# Patient Record
Sex: FEMALE | Race: WHITE | NOT HISPANIC OR LATINO | Employment: UNEMPLOYED | URBAN - METROPOLITAN AREA
[De-identification: names, ages, dates, MRNs, and addresses within clinical notes are randomized per-mention and may not be internally consistent; named-entity substitution may affect disease eponyms.]

---

## 2020-06-12 ENCOUNTER — LAB VISIT (OUTPATIENT)
Dept: PRIMARY CARE CLINIC | Facility: OTHER | Age: 60
End: 2020-06-12
Payer: MEDICAID

## 2020-06-12 DIAGNOSIS — Z11.59 SCREENING EXAMINATION FOR POLIOMYELITIS: ICD-10-CM

## 2020-06-12 PROCEDURE — U0003 INFECTIOUS AGENT DETECTION BY NUCLEIC ACID (DNA OR RNA); SEVERE ACUTE RESPIRATORY SYNDROME CORONAVIRUS 2 (SARS-COV-2) (CORONAVIRUS DISEASE [COVID-19]), AMPLIFIED PROBE TECHNIQUE, MAKING USE OF HIGH THROUGHPUT TECHNOLOGIES AS DESCRIBED BY CMS-2020-01-R: HCPCS

## 2020-06-13 LAB — SARS-COV-2 RNA RESP QL NAA+PROBE: NOT DETECTED

## 2020-12-10 ENCOUNTER — OFFICE VISIT (OUTPATIENT)
Dept: URGENT CARE | Facility: CLINIC | Age: 60
End: 2020-12-10
Payer: MEDICARE

## 2020-12-10 VITALS
TEMPERATURE: 98 F | HEART RATE: 97 BPM | RESPIRATION RATE: 20 BRPM | DIASTOLIC BLOOD PRESSURE: 69 MMHG | OXYGEN SATURATION: 96 % | WEIGHT: 190 LBS | SYSTOLIC BLOOD PRESSURE: 114 MMHG | HEIGHT: 66 IN | BODY MASS INDEX: 30.53 KG/M2

## 2020-12-10 DIAGNOSIS — U07.1 COVID-19 VIRUS DETECTED: ICD-10-CM

## 2020-12-10 DIAGNOSIS — Z03.818 ENCNTR FOR OBS FOR SUSP EXPSR TO OTH BIOLG AGENTS RULED OUT: ICD-10-CM

## 2020-12-10 DIAGNOSIS — R09.81 CONGESTION OF NASAL SINUS: Primary | ICD-10-CM

## 2020-12-10 DIAGNOSIS — U07.1 LAB TEST POSITIVE FOR DETECTION OF COVID-19 VIRUS: ICD-10-CM

## 2020-12-10 LAB
CTP QC/QA: YES
SARS-COV-2 RDRP RESP QL NAA+PROBE: POSITIVE

## 2020-12-10 PROCEDURE — 99213 OFFICE O/P EST LOW 20 MIN: CPT | Mod: S$GLB,CS,, | Performed by: FAMILY MEDICINE

## 2020-12-10 PROCEDURE — U0002: ICD-10-PCS | Mod: QW,S$GLB,, | Performed by: FAMILY MEDICINE

## 2020-12-10 PROCEDURE — 99213 PR OFFICE/OUTPT VISIT, EST, LEVL III, 20-29 MIN: ICD-10-PCS | Mod: S$GLB,CS,, | Performed by: FAMILY MEDICINE

## 2020-12-10 PROCEDURE — U0002 COVID-19 LAB TEST NON-CDC: HCPCS | Mod: QW,S$GLB,, | Performed by: FAMILY MEDICINE

## 2020-12-10 RX ORDER — LORATADINE 10 MG/1
10 TABLET ORAL DAILY
Qty: 30 TABLET | Refills: 2 | Status: SHIPPED | OUTPATIENT
Start: 2020-12-10 | End: 2021-06-21

## 2020-12-10 NOTE — PROGRESS NOTES
"Subjective:       Patient ID: Maria M Mei is a 60 y.o. female.    Vitals:  height is 5' 6" (1.676 m) and weight is 86.2 kg (190 lb). Her temperature is 98.1 °F (36.7 °C). Her blood pressure is 114/69 and her pulse is 97. Her respiration is 20 and oxygen saturation is 96%.     Chief Complaint: Nasal Congestion (COVID)    60-year-old female with the complaint of having sore throat postnasal drip and sinus congestion for last few days it does did cut exposed to someone at [facility] COVID positive denies fever or chills    Other  This is a new problem. The current episode started yesterday. The problem has been waxing and waning. Pertinent negatives include no arthralgias, chest pain, chills, congestion, coughing, fatigue, fever, headaches, joint swelling, myalgias, nausea, rash, sore throat, vertigo, vomiting or weakness. Nothing aggravates the symptoms. She has tried acetaminophen and NSAIDs (BENADRYL ) for the symptoms. The treatment provided mild relief.       Constitution: Negative for chills, fatigue and fever.   HENT: Positive for sinus pain and sinus pressure. Negative for congestion and sore throat.    Neck: Negative for painful lymph nodes.   Cardiovascular: Negative for chest pain and leg swelling.   Eyes: Negative for double vision and blurred vision.   Respiratory: Negative for cough and shortness of breath.    Gastrointestinal: Negative for nausea, vomiting and diarrhea.   Genitourinary: Negative for dysuria, frequency, urgency and history of kidney stones.   Musculoskeletal: Negative for joint pain, joint swelling, muscle cramps and muscle ache.   Skin: Negative for color change, pale, rash and bruising.   Allergic/Immunologic: Negative for seasonal allergies.   Neurological: Negative for dizziness, history of vertigo, light-headedness, passing out and headaches.   Hematologic/Lymphatic: Negative for swollen lymph nodes.   Psychiatric/Behavioral: Negative for nervous/anxious, sleep disturbance and " depression. The patient is not nervous/anxious.        Objective:      Physical Exam   Constitutional: She is oriented to person, place, and time. She appears well-developed. She is cooperative.  Non-toxic appearance. She does not appear ill. No distress.   HENT:   Head: Normocephalic and atraumatic.   Ears:   Right Ear: Hearing, tympanic membrane, external ear and ear canal normal.   Left Ear: Hearing, tympanic membrane, external ear and ear canal normal.   Nose: Nose normal. No mucosal edema, rhinorrhea or nasal deformity. No epistaxis. Right sinus exhibits no maxillary sinus tenderness and no frontal sinus tenderness. Left sinus exhibits no maxillary sinus tenderness and no frontal sinus tenderness.   Mouth/Throat: Uvula is midline, oropharynx is clear and moist and mucous membranes are normal. No trismus in the jaw. Normal dentition. No uvula swelling. No posterior oropharyngeal erythema.   Eyes: Conjunctivae and lids are normal. Right eye exhibits no discharge. Left eye exhibits no discharge. No scleral icterus.   Neck: Trachea normal, normal range of motion, full passive range of motion without pain and phonation normal. Neck supple.   Cardiovascular: Normal rate, regular rhythm, normal heart sounds and normal pulses.   Pulmonary/Chest: Effort normal and breath sounds normal. No respiratory distress.   Abdominal: Soft. Normal appearance and bowel sounds are normal. She exhibits no distension, no pulsatile midline mass and no mass. There is no abdominal tenderness.   Musculoskeletal: Normal range of motion.         General: No deformity.   Neurological: She is alert and oriented to person, place, and time. She exhibits normal muscle tone. Coordination normal.   Skin: Skin is warm, dry, intact, not diaphoretic and not pale. Psychiatric: Her speech is normal and behavior is normal. Judgment and thought content normal.   Nursing note and vitals reviewed.        Assessment:       1. Congestion of nasal sinus    2.  Encntr for obs for susp expsr to oth biolg agents ruled out        Plan:         Congestion of nasal sinus  -     POCT COVID-19 Rapid Screening    Encntr for obs for susp expsr to oth biolg agents ruled out    Other orders  -     loratadine (CLARITIN) 10 mg tablet; Take 1 tablet (10 mg total) by mouth once daily.  Dispense: 30 tablet; Refill: 2            Results for orders placed or performed in visit on 12/10/20   POCT COVID-19 Rapid Screening   Result Value Ref Range    POC Rapid COVID Positive (A) Negative     Acceptable Yes      Patient advised to quarantine for 7-10 days Tylenol as needed for fever Claritin once a day rest and fluids

## 2020-12-10 NOTE — PATIENT INSTRUCTIONS
Please isolate yourself at home.  You may leave home and/or return to work once the following conditions are met:    If you were not hospitalized and are not severely immunocompromised*:   More than 10 days since symptoms first appeared AND   More than 24 hours fever free without medications AND   Symptoms have improved     If you were hospitalized OR are severely immunocompromised*:   More than 20 days since symptoms first appeared   More than 24 hours fever free without medications   Symptoms have improved    If you had no symptoms but tested positive:   More than 10 days since the date of the first positive test (20 days if severely immunocompromised).   If you develop symptoms, then use the guidelines above.     *Definition of severely immunocompromised:  - Current chemotherapy for cancer  - Untreated HIV with CD4 count less than 200  - Combined primary immunodeficiency disorder  - Prednisone more than 20 mg per day for more than 14 days  - Post-transplant patients    Additional instructions:   Separate yourself from other people and animals in your home.   Call ahead before visiting your doctor.   Wear a facemask when around others.   Cover your coughs and sneezes.   Wash your hands often with soap and water; hand  can be used, too.   Avoid sharing personal household items.   Wipe down surfaces used daily.   Monitor your symptoms. Seek prompt medical attention if your illness is worsening (e.g., difficulty breathing).    Before seeking care, call your healthcare provider.   If you have a medical emergency and need to call 911, notify the dispatch personnel that you have, or are being evaluated for COVID-19. If possible, put on a facemask before emergency medical services arrive.        Contact Tracing    As one of the next steps, you will receive a call or text from the [place] [facility] ([facility]) COVID-19 Tracing Team. See the contact information below so you know not to  ignore the health departments call. It is important that you contact them back immediately so they can help.      Contact Tracer Number:  [phone]  Caller ID for most carriers: [place] [facility]     What is contact tracing?  · Contact tracing is a process that helps identify everyone who has been in close contact with an infected person. Contact tracers let those people know they may have been exposed and guide them on next steps. Confidentiality is important for everyone; no one will be told who may have exposed them to the virus.  · Your involvement is important. The more we know about where and how this virus is spreading, the better chance we have at stopping it from spreading further.  What does exposure mean?  · Exposure means you have been within 6 feet for more than 15 minutes with a person who has or had COVID-19.  What kind of questions do the contact tracers ask?  · A contact tracer will confirm your basic contact information including name, address, phone number, and next of kin, as well as asking about any symptoms you may have had. Theyll also ask you how you think you may have gotten sick, such as places where you may have been exposed to the virus, and people you were with. Those names will never be shared with anyone outside of that call, and will only be used to help trace and stop the spread of the virus.   I have privacy concerns. How will the state use my information?  · Your privacy about your health is important. All calls are completed using call centers that use the appropriate health privacy protection measures (HIPAA compliance), meaning that your patient information is safe. No one will ever ask you any questions related to immigration status. Your health comes first.   Do I have to participate?  · You do not have to participate, but we strongly encourage you to. Contact tracing can help us catch and control new outbreaks as theyre developing to keep your friends and family safe.    What if I dont hear from anyone?  · If you dont receive a call within 24 hours, you can call the number above right away to inquire about your status. That line is open from 8:00 am - 8:00 p.m., 7 days a week.  Contact tracing saves lives! Together, we have the power to beat this virus and keep our loved ones and neighbors safe.    For more information see CDC link below.      https://www.cdc.gov/coronavirus/2019-ncov/hcp/guidance-prevent-spread.html#precautions        Sources:  CDC, [place] [facility] and [facility]

## 2021-04-23 ENCOUNTER — TELEPHONE (OUTPATIENT)
Dept: FAMILY MEDICINE | Facility: CLINIC | Age: 61
End: 2021-04-23

## 2021-04-23 NOTE — TELEPHONE ENCOUNTER
----- Message from Bea Winkler sent at 4/23/2021  8:26 AM CDT -----  Regarding: Appointment Request  Contact: Self/ [phone]  Patient would like to be seen sooner than the next available appointment. She would like to be seen in order to establish care. Please advise.

## 2021-05-20 ENCOUNTER — TELEPHONE (OUTPATIENT)
Dept: OBSTETRICS AND GYNECOLOGY | Facility: CLINIC | Age: 61
End: 2021-05-20

## 2021-05-22 ENCOUNTER — HOSPITAL ENCOUNTER (OUTPATIENT)
Facility: HOSPITAL | Age: 61
End: 2021-05-23
Attending: EMERGENCY MEDICINE | Admitting: FAMILY MEDICINE
Payer: COMMERCIAL

## 2021-05-22 DIAGNOSIS — I63.9 STROKE: ICD-10-CM

## 2021-05-22 LAB
ALBUMIN SERPL BCP-MCNC: 3.7 G/DL (ref 3.5–5.2)
ALP SERPL-CCNC: 85 U/L (ref 55–135)
ALT SERPL W/O P-5'-P-CCNC: 21 U/L (ref 10–44)
ANION GAP SERPL CALC-SCNC: 7 MMOL/L (ref 8–16)
APTT BLDCRRT: 23.2 SEC (ref 21–32)
AST SERPL-CCNC: 22 U/L (ref 10–40)
B-OH-BUTYR BLD STRIP-SCNC: 0 MMOL/L (ref 0–0.5)
BASOPHILS # BLD AUTO: 0.07 K/UL (ref 0–0.2)
BASOPHILS NFR BLD: 0.7 % (ref 0–1.9)
BILIRUB SERPL-MCNC: 0.3 MG/DL (ref 0.1–1)
BUN SERPL-MCNC: 25 MG/DL (ref 6–20)
CALCIUM SERPL-MCNC: 9.6 MG/DL (ref 8.7–10.5)
CHLORIDE SERPL-SCNC: 107 MMOL/L (ref 95–110)
CHOLEST SERPL-MCNC: 239 MG/DL (ref 120–199)
CHOLEST/HDLC SERPL: 7.7 {RATIO} (ref 2–5)
CO2 SERPL-SCNC: 28 MMOL/L (ref 23–29)
CREAT SERPL-MCNC: 1.3 MG/DL (ref 0.5–1.4)
CREAT SERPL-MCNC: 1.4 MG/DL (ref 0.5–1.4)
DELSYS: NORMAL
DELSYS: NORMAL
DIFFERENTIAL METHOD: ABNORMAL
EOSINOPHIL # BLD AUTO: 0.3 K/UL (ref 0–0.5)
EOSINOPHIL NFR BLD: 2.7 % (ref 0–8)
ERYTHROCYTE [DISTWIDTH] IN BLOOD BY AUTOMATED COUNT: 13.8 % (ref 11.5–14.5)
EST. GFR  (AFRICAN AMERICAN): 52 ML/MIN/1.73 M^2
EST. GFR  (NON AFRICAN AMERICAN): 45 ML/MIN/1.73 M^2
GLUCOSE SERPL-MCNC: 89 MG/DL (ref 70–110)
GLUCOSE SERPL-MCNC: 98 MG/DL (ref 70–110)
HCT VFR BLD AUTO: 41.9 % (ref 37–48.5)
HDLC SERPL-MCNC: 31 MG/DL (ref 40–75)
HDLC SERPL: 13 % (ref 20–50)
HGB BLD-MCNC: 14 G/DL (ref 12–16)
IMM GRANULOCYTES # BLD AUTO: 0.03 K/UL (ref 0–0.04)
IMM GRANULOCYTES NFR BLD AUTO: 0.3 % (ref 0–0.5)
INR PPP: 1 (ref 0.8–1.2)
LDLC SERPL CALC-MCNC: 135 MG/DL (ref 63–159)
LYMPHOCYTES # BLD AUTO: 4.3 K/UL (ref 1–4.8)
LYMPHOCYTES NFR BLD: 42.7 % (ref 18–48)
MCH RBC QN AUTO: 30.1 PG (ref 27–31)
MCHC RBC AUTO-ENTMCNC: 33.4 G/DL (ref 32–36)
MCV RBC AUTO: 90 FL (ref 82–98)
MONOCYTES # BLD AUTO: 0.7 K/UL (ref 0.3–1)
MONOCYTES NFR BLD: 7 % (ref 4–15)
NEUTROPHILS # BLD AUTO: 4.7 K/UL (ref 1.8–7.7)
NEUTROPHILS NFR BLD: 46.6 % (ref 38–73)
NONHDLC SERPL-MCNC: 208 MG/DL
NRBC BLD-RTO: 0 /100 WBC
PLATELET # BLD AUTO: 280 K/UL (ref 150–450)
PMV BLD AUTO: 8.9 FL (ref 9.2–12.9)
POC PTINR: 1 (ref 0.9–1.2)
POC PTWBT: 11.9 SEC (ref 9.7–14.3)
POCT GLUCOSE: 91 MG/DL (ref 70–110)
POTASSIUM SERPL-SCNC: 3.9 MMOL/L (ref 3.5–5.1)
PROT SERPL-MCNC: 7.1 G/DL (ref 6–8.4)
PROTHROMBIN TIME: 10.6 SEC (ref 9–12.5)
RBC # BLD AUTO: 4.65 M/UL (ref 4–5.4)
SAMPLE: NORMAL
SAMPLE: NORMAL
SODIUM SERPL-SCNC: 142 MMOL/L (ref 136–145)
TRIGL SERPL-MCNC: 365 MG/DL (ref 30–150)
WBC # BLD AUTO: 10.08 K/UL (ref 3.9–12.7)

## 2021-05-22 PROCEDURE — 84484 ASSAY OF TROPONIN QUANT: CPT | Performed by: EMERGENCY MEDICINE

## 2021-05-22 PROCEDURE — 99292 CRITICAL CARE ADDL 30 MIN: CPT

## 2021-05-22 PROCEDURE — 25000003 PHARM REV CODE 250: Performed by: EMERGENCY MEDICINE

## 2021-05-22 PROCEDURE — 85730 THROMBOPLASTIN TIME PARTIAL: CPT | Performed by: EMERGENCY MEDICINE

## 2021-05-22 PROCEDURE — 85610 PROTHROMBIN TIME: CPT

## 2021-05-22 PROCEDURE — U0002 COVID-19 LAB TEST NON-CDC: HCPCS | Performed by: EMERGENCY MEDICINE

## 2021-05-22 PROCEDURE — 85610 PROTHROMBIN TIME: CPT | Performed by: EMERGENCY MEDICINE

## 2021-05-22 PROCEDURE — G0426 PR INPT TELEHEALTH CONSULT 50M: ICD-10-PCS | Mod: GT,,, | Performed by: PSYCHIATRY & NEUROLOGY

## 2021-05-22 PROCEDURE — G0426 INPT/ED TELECONSULT50: HCPCS | Mod: GT,,, | Performed by: PSYCHIATRY & NEUROLOGY

## 2021-05-22 PROCEDURE — 82010 KETONE BODYS QUAN: CPT | Performed by: EMERGENCY MEDICINE

## 2021-05-22 PROCEDURE — 80061 LIPID PANEL: CPT | Performed by: EMERGENCY MEDICINE

## 2021-05-22 PROCEDURE — 99291 CRITICAL CARE FIRST HOUR: CPT | Mod: 25

## 2021-05-22 PROCEDURE — 82565 ASSAY OF CREATININE: CPT

## 2021-05-22 PROCEDURE — 85025 COMPLETE CBC W/AUTO DIFF WBC: CPT | Performed by: EMERGENCY MEDICINE

## 2021-05-22 PROCEDURE — 84443 ASSAY THYROID STIM HORMONE: CPT | Performed by: EMERGENCY MEDICINE

## 2021-05-22 PROCEDURE — 99900035 HC TECH TIME PER 15 MIN (STAT)

## 2021-05-22 PROCEDURE — 83880 ASSAY OF NATRIURETIC PEPTIDE: CPT | Performed by: EMERGENCY MEDICINE

## 2021-05-22 PROCEDURE — 80053 COMPREHEN METABOLIC PANEL: CPT | Performed by: EMERGENCY MEDICINE

## 2021-05-22 PROCEDURE — 82962 GLUCOSE BLOOD TEST: CPT

## 2021-05-22 RX ORDER — ASPIRIN 325 MG
325 TABLET ORAL
Status: COMPLETED | OUTPATIENT
Start: 2021-05-22 | End: 2021-05-22

## 2021-05-22 RX ADMIN — ASPIRIN 325 MG ORAL TABLET 325 MG: 325 PILL ORAL at 11:05

## 2021-05-23 ENCOUNTER — HOSPITAL ENCOUNTER (INPATIENT)
Facility: HOSPITAL | Age: 61
LOS: 1 days | Discharge: HOME | DRG: 065 | End: 2021-05-24
Attending: EMERGENCY MEDICINE | Admitting: PSYCHIATRY & NEUROLOGY
Payer: MEDICARE

## 2021-05-23 VITALS
WEIGHT: 209 LBS | BODY MASS INDEX: 33.73 KG/M2 | HEART RATE: 63 BPM | OXYGEN SATURATION: 97 % | RESPIRATION RATE: 20 BRPM | DIASTOLIC BLOOD PRESSURE: 69 MMHG | TEMPERATURE: 98 F | SYSTOLIC BLOOD PRESSURE: 149 MMHG

## 2021-05-23 DIAGNOSIS — I63.512 ACUTE ISCHEMIC LEFT MCA STROKE: ICD-10-CM

## 2021-05-23 DIAGNOSIS — G45.9 TIA (TRANSIENT ISCHEMIC ATTACK): ICD-10-CM

## 2021-05-23 DIAGNOSIS — I63.412 EMBOLIC STROKE INVOLVING LEFT MIDDLE CEREBRAL ARTERY: ICD-10-CM

## 2021-05-23 DIAGNOSIS — I63.9 CEREBROVASCULAR ACCIDENT (CVA), UNSPECIFIED MECHANISM: Primary | ICD-10-CM

## 2021-05-23 PROBLEM — G93.6 CYTOTOXIC CEREBRAL EDEMA: Status: ACTIVE | Noted: 2021-05-23

## 2021-05-23 PROBLEM — F17.200 SMOKER: Status: ACTIVE | Noted: 2021-05-23

## 2021-05-23 PROBLEM — E78.2 MIXED HYPERLIPIDEMIA: Status: ACTIVE | Noted: 2021-05-23

## 2021-05-23 PROBLEM — Z92.82 S/P ADMN TPA IN DIFF FAC W/N LAST 24 HR BEF ADM TO CRNT FAC: Status: ACTIVE | Noted: 2021-05-23

## 2021-05-23 LAB
ABO + RH BLD: NORMAL
BACTERIA #/AREA URNS AUTO: NORMAL /HPF
BILIRUB UR QL STRIP: NEGATIVE
BLD GP AB SCN CELLS X3 SERPL QL: NORMAL
BNP SERPL-MCNC: 16 PG/ML (ref 0–99)
CHOLEST SERPL-MCNC: 216 MG/DL (ref 120–199)
CHOLEST/HDLC SERPL: 8.6 {RATIO} (ref 2–5)
CLARITY UR REFRACT.AUTO: CLEAR
COLOR UR AUTO: YELLOW
CTP QC/QA: YES
ESTIMATED AVG GLUCOSE: 108 MG/DL (ref 68–131)
GLUCOSE UR QL STRIP: NEGATIVE
HBA1C MFR BLD: 5.4 % (ref 4–5.6)
HDLC SERPL-MCNC: 25 MG/DL (ref 40–75)
HDLC SERPL: 11.6 % (ref 20–50)
HGB UR QL STRIP: NEGATIVE
KETONES UR QL STRIP: NEGATIVE
LDLC SERPL CALC-MCNC: 130.2 MG/DL (ref 63–159)
LEUKOCYTE ESTERASE UR QL STRIP: NEGATIVE
MICROSCOPIC COMMENT: NORMAL
NITRITE UR QL STRIP: NEGATIVE
NONHDLC SERPL-MCNC: 191 MG/DL
PH UR STRIP: 5 [PH] (ref 5–8)
POCT GLUCOSE: 83 MG/DL (ref 70–110)
PROT UR QL STRIP: NEGATIVE
RBC #/AREA URNS AUTO: 0 /HPF (ref 0–4)
SARS-COV-2 RDRP RESP QL NAA+PROBE: NEGATIVE
SP GR UR STRIP: >=1.03 (ref 1–1.03)
TRIGL SERPL-MCNC: 304 MG/DL (ref 30–150)
TROPONIN I SERPL DL<=0.01 NG/ML-MCNC: 0.01 NG/ML (ref 0–0.03)
TSH SERPL DL<=0.005 MIU/L-ACNC: 2.92 UIU/ML (ref 0.4–4)
TSH SERPL DL<=0.005 MIU/L-ACNC: 3.7 UIU/ML (ref 0.4–4)
URN SPEC COLLECT METH UR: ABNORMAL
WBC #/AREA URNS AUTO: 1 /HPF (ref 0–5)

## 2021-05-23 PROCEDURE — 37195 THROMBOLYTIC THERAPY STROKE: CPT

## 2021-05-23 PROCEDURE — 99291 CRITICAL CARE FIRST HOUR: CPT | Mod: ,,, | Performed by: EMERGENCY MEDICINE

## 2021-05-23 PROCEDURE — 93010 EKG 12-LEAD: ICD-10-PCS | Mod: ,,, | Performed by: INTERNAL MEDICINE

## 2021-05-23 PROCEDURE — G0378 HOSPITAL OBSERVATION PER HR: HCPCS

## 2021-05-23 PROCEDURE — 25000003 PHARM REV CODE 250: Performed by: EMERGENCY MEDICINE

## 2021-05-23 PROCEDURE — 84443 ASSAY THYROID STIM HORMONE: CPT | Performed by: PHYSICIAN ASSISTANT

## 2021-05-23 PROCEDURE — 93010 ELECTROCARDIOGRAM REPORT: CPT | Mod: 76,,, | Performed by: INTERNAL MEDICINE

## 2021-05-23 PROCEDURE — 94761 N-INVAS EAR/PLS OXIMETRY MLT: CPT

## 2021-05-23 PROCEDURE — 25000003 PHARM REV CODE 250: Performed by: NURSE PRACTITIONER

## 2021-05-23 PROCEDURE — 93010 EKG 12-LEAD: ICD-10-PCS | Mod: 76,,, | Performed by: INTERNAL MEDICINE

## 2021-05-23 PROCEDURE — 92610 EVALUATE SWALLOWING FUNCTION: CPT

## 2021-05-23 PROCEDURE — 80061 LIPID PANEL: CPT | Performed by: PHYSICIAN ASSISTANT

## 2021-05-23 PROCEDURE — 25000003 PHARM REV CODE 250: Performed by: PSYCHIATRY & NEUROLOGY

## 2021-05-23 PROCEDURE — 63600175 PHARM REV CODE 636 W HCPCS: Mod: JG | Performed by: EMERGENCY MEDICINE

## 2021-05-23 PROCEDURE — 25500020 PHARM REV CODE 255: Performed by: EMERGENCY MEDICINE

## 2021-05-23 PROCEDURE — 93010 ELECTROCARDIOGRAM REPORT: CPT | Mod: ,,, | Performed by: INTERNAL MEDICINE

## 2021-05-23 PROCEDURE — 99223 1ST HOSP IP/OBS HIGH 75: CPT | Mod: ,,, | Performed by: PSYCHIATRY & NEUROLOGY

## 2021-05-23 PROCEDURE — 81001 URINALYSIS AUTO W/SCOPE: CPT | Performed by: PHYSICIAN ASSISTANT

## 2021-05-23 PROCEDURE — 83036 HEMOGLOBIN GLYCOSYLATED A1C: CPT | Performed by: PHYSICIAN ASSISTANT

## 2021-05-23 PROCEDURE — 99223 1ST HOSP IP/OBS HIGH 75: CPT | Mod: ,,, | Performed by: PHYSICIAN ASSISTANT

## 2021-05-23 PROCEDURE — 63600175 PHARM REV CODE 636 W HCPCS: Mod: JW,JG | Performed by: EMERGENCY MEDICINE

## 2021-05-23 PROCEDURE — 93005 ELECTROCARDIOGRAM TRACING: CPT

## 2021-05-23 PROCEDURE — S4991 NICOTINE PATCH NONLEGEND: HCPCS | Performed by: NURSE PRACTITIONER

## 2021-05-23 PROCEDURE — 25000003 PHARM REV CODE 250: Performed by: PHYSICIAN ASSISTANT

## 2021-05-23 PROCEDURE — 97165 OT EVAL LOW COMPLEX 30 MIN: CPT

## 2021-05-23 PROCEDURE — 86803 HEPATITIS C AB TEST: CPT | Performed by: EMERGENCY MEDICINE

## 2021-05-23 PROCEDURE — 99291 PR CRITICAL CARE, E/M 30-74 MINUTES: ICD-10-PCS | Mod: ,,, | Performed by: EMERGENCY MEDICINE

## 2021-05-23 PROCEDURE — 99291 CRITICAL CARE FIRST HOUR: CPT | Mod: 25

## 2021-05-23 PROCEDURE — 86900 BLOOD TYPING SEROLOGIC ABO: CPT | Performed by: PHYSICIAN ASSISTANT

## 2021-05-23 PROCEDURE — 20000000 HC ICU ROOM

## 2021-05-23 PROCEDURE — 99223 PR INITIAL HOSPITAL CARE,LEVL III: ICD-10-PCS | Mod: ,,, | Performed by: PSYCHIATRY & NEUROLOGY

## 2021-05-23 PROCEDURE — 86703 HIV-1/HIV-2 1 RESULT ANTBDY: CPT | Performed by: EMERGENCY MEDICINE

## 2021-05-23 PROCEDURE — 25500020 PHARM REV CODE 255: Performed by: FAMILY MEDICINE

## 2021-05-23 PROCEDURE — A9585 GADOBUTROL INJECTION: HCPCS | Performed by: FAMILY MEDICINE

## 2021-05-23 PROCEDURE — 99292 CRITICAL CARE ADDL 30 MIN: CPT | Mod: 25

## 2021-05-23 PROCEDURE — 99223 PR INITIAL HOSPITAL CARE,LEVL III: ICD-10-PCS | Mod: ,,, | Performed by: PHYSICIAN ASSISTANT

## 2021-05-23 RX ORDER — ASPIRIN 81 MG/1
81 TABLET ORAL DAILY
Status: DISCONTINUED | OUTPATIENT
Start: 2021-05-23 | End: 2021-05-23

## 2021-05-23 RX ORDER — ONDANSETRON 2 MG/ML
8 INJECTION INTRAMUSCULAR; INTRAVENOUS EVERY 8 HOURS PRN
Status: DISCONTINUED | OUTPATIENT
Start: 2021-05-23 | End: 2021-05-24 | Stop reason: HOSPADM

## 2021-05-23 RX ORDER — ATORVASTATIN CALCIUM 20 MG/1
40 TABLET, FILM COATED ORAL DAILY
Status: DISCONTINUED | OUTPATIENT
Start: 2021-05-23 | End: 2021-05-24 | Stop reason: HOSPADM

## 2021-05-23 RX ORDER — SODIUM CHLORIDE 0.9 % (FLUSH) 0.9 %
10 SYRINGE (ML) INJECTION
Status: DISCONTINUED | OUTPATIENT
Start: 2021-05-23 | End: 2021-05-24 | Stop reason: HOSPADM

## 2021-05-23 RX ORDER — CLOPIDOGREL BISULFATE 75 MG/1
75 TABLET ORAL DAILY
Status: DISCONTINUED | OUTPATIENT
Start: 2021-05-23 | End: 2021-05-23

## 2021-05-23 RX ORDER — ONDANSETRON 2 MG/ML
4 INJECTION INTRAMUSCULAR; INTRAVENOUS EVERY 12 HOURS PRN
Status: DISCONTINUED | OUTPATIENT
Start: 2021-05-23 | End: 2021-05-23

## 2021-05-23 RX ORDER — FAMOTIDINE 20 MG/1
20 TABLET, FILM COATED ORAL 2 TIMES DAILY
Status: DISCONTINUED | OUTPATIENT
Start: 2021-05-23 | End: 2021-05-24 | Stop reason: HOSPADM

## 2021-05-23 RX ORDER — MUPIROCIN 20 MG/G
OINTMENT TOPICAL 2 TIMES DAILY
Status: DISCONTINUED | OUTPATIENT
Start: 2021-05-23 | End: 2021-05-24 | Stop reason: HOSPADM

## 2021-05-23 RX ORDER — ATORVASTATIN CALCIUM 40 MG/1
40 TABLET, FILM COATED ORAL DAILY
Status: DISCONTINUED | OUTPATIENT
Start: 2021-05-23 | End: 2021-05-23

## 2021-05-23 RX ORDER — SODIUM CHLORIDE 9 MG/ML
INJECTION, SOLUTION INTRAVENOUS
Status: COMPLETED | OUTPATIENT
Start: 2021-05-23 | End: 2021-05-23

## 2021-05-23 RX ORDER — SODIUM CHLORIDE 0.9 % (FLUSH) 0.9 %
10 SYRINGE (ML) INJECTION
Status: DISCONTINUED | OUTPATIENT
Start: 2021-05-23 | End: 2021-05-23

## 2021-05-23 RX ORDER — ENOXAPARIN SODIUM 100 MG/ML
40 INJECTION SUBCUTANEOUS EVERY 24 HOURS
Status: DISCONTINUED | OUTPATIENT
Start: 2021-05-23 | End: 2021-05-23

## 2021-05-23 RX ORDER — AMOXICILLIN 250 MG
1 CAPSULE ORAL 2 TIMES DAILY
Status: DISCONTINUED | OUTPATIENT
Start: 2021-05-23 | End: 2021-05-24 | Stop reason: HOSPADM

## 2021-05-23 RX ORDER — IBUPROFEN 200 MG
1 TABLET ORAL DAILY
Status: DISCONTINUED | OUTPATIENT
Start: 2021-05-23 | End: 2021-05-24 | Stop reason: HOSPADM

## 2021-05-23 RX ORDER — SODIUM CHLORIDE 9 MG/ML
INJECTION, SOLUTION INTRAVENOUS CONTINUOUS
Status: DISCONTINUED | OUTPATIENT
Start: 2021-05-23 | End: 2021-05-24 | Stop reason: HOSPADM

## 2021-05-23 RX ORDER — ACETAMINOPHEN 325 MG/1
650 TABLET ORAL EVERY 6 HOURS PRN
Status: DISCONTINUED | OUTPATIENT
Start: 2021-05-23 | End: 2021-05-23

## 2021-05-23 RX ORDER — GADOBUTROL 604.72 MG/ML
10 INJECTION INTRAVENOUS
Status: COMPLETED | OUTPATIENT
Start: 2021-05-23 | End: 2021-05-23

## 2021-05-23 RX ORDER — LABETALOL HYDROCHLORIDE 5 MG/ML
10 INJECTION, SOLUTION INTRAVENOUS
Status: DISCONTINUED | OUTPATIENT
Start: 2021-05-23 | End: 2021-05-23

## 2021-05-23 RX ORDER — ACETAMINOPHEN 325 MG/1
650 TABLET ORAL EVERY 6 HOURS PRN
Status: DISCONTINUED | OUTPATIENT
Start: 2021-05-23 | End: 2021-05-24 | Stop reason: HOSPADM

## 2021-05-23 RX ADMIN — SODIUM CHLORIDE: 9 INJECTION, SOLUTION INTRAVENOUS at 03:05

## 2021-05-23 RX ADMIN — SODIUM CHLORIDE 100 ML/HR: 0.9 INJECTION, SOLUTION INTRAVENOUS at 12:05

## 2021-05-23 RX ADMIN — FAMOTIDINE 20 MG: 20 TABLET ORAL at 08:05

## 2021-05-23 RX ADMIN — ATORVASTATIN CALCIUM 40 MG: 20 TABLET, FILM COATED ORAL at 10:05

## 2021-05-23 RX ADMIN — DOCUSATE SODIUM 50MG AND SENNOSIDES 8.6MG 1 TABLET: 8.6; 5 TABLET, FILM COATED ORAL at 10:05

## 2021-05-23 RX ADMIN — GADOBUTROL 10 ML: 604.72 INJECTION INTRAVENOUS at 01:05

## 2021-05-23 RX ADMIN — ALTEPLASE 76.8 MG: KIT at 02:05

## 2021-05-23 RX ADMIN — MUPIROCIN: 20 OINTMENT TOPICAL at 08:05

## 2021-05-23 RX ADMIN — FAMOTIDINE 20 MG: 20 TABLET ORAL at 10:05

## 2021-05-23 RX ADMIN — SODIUM CHLORIDE: 0.9 INJECTION, SOLUTION INTRAVENOUS at 06:05

## 2021-05-23 RX ADMIN — IOHEXOL 125 ML: 350 INJECTION, SOLUTION INTRAVENOUS at 04:05

## 2021-05-23 RX ADMIN — IOHEXOL 100 ML: 350 INJECTION, SOLUTION INTRAVENOUS at 12:05

## 2021-05-23 RX ADMIN — DOCUSATE SODIUM 50MG AND SENNOSIDES 8.6MG 1 TABLET: 8.6; 5 TABLET, FILM COATED ORAL at 08:05

## 2021-05-23 RX ADMIN — NICOTINE 1 PATCH: 21 PATCH, EXTENDED RELEASE TRANSDERMAL at 06:05

## 2021-05-24 VITALS
BODY MASS INDEX: 31.71 KG/M2 | SYSTOLIC BLOOD PRESSURE: 133 MMHG | DIASTOLIC BLOOD PRESSURE: 75 MMHG | TEMPERATURE: 98 F | WEIGHT: 202 LBS | RESPIRATION RATE: 18 BRPM | HEIGHT: 67 IN | OXYGEN SATURATION: 93 % | HEART RATE: 56 BPM

## 2021-05-24 LAB
ALBUMIN SERPL BCP-MCNC: 3.5 G/DL (ref 3.5–5.2)
ALP SERPL-CCNC: 90 U/L (ref 55–135)
ALT SERPL W/O P-5'-P-CCNC: 25 U/L (ref 10–44)
ANION GAP SERPL CALC-SCNC: 10 MMOL/L (ref 8–16)
ASCENDING AORTA: 3.12 CM
AST SERPL-CCNC: 27 U/L (ref 10–40)
AV INDEX (PROSTH): 0.66
AV MEAN GRADIENT: 5 MMHG
AV PEAK GRADIENT: 9 MMHG
AV VALVE AREA: 2.38 CM2
AV VELOCITY RATIO: 0.71
BASOPHILS # BLD AUTO: 0.05 K/UL (ref 0–0.2)
BASOPHILS NFR BLD: 0.6 % (ref 0–1.9)
BILIRUB SERPL-MCNC: 0.5 MG/DL (ref 0.1–1)
BSA FOR ECHO PROCEDURE: 2.08 M2
BUN SERPL-MCNC: 16 MG/DL (ref 6–20)
CALCIUM SERPL-MCNC: 9 MG/DL (ref 8.7–10.5)
CHLORIDE SERPL-SCNC: 109 MMOL/L (ref 95–110)
CO2 SERPL-SCNC: 21 MMOL/L (ref 23–29)
CREAT SERPL-MCNC: 0.9 MG/DL (ref 0.5–1.4)
CV ECHO LV RWT: 0.25 CM
DIFFERENTIAL METHOD: ABNORMAL
DOP CALC AO PEAK VEL: 1.47 M/S
DOP CALC AO VTI: 31.84 CM
DOP CALC LVOT AREA: 3.6 CM2
DOP CALC LVOT DIAMETER: 2.14 CM
DOP CALC LVOT PEAK VEL: 1.04 M/S
DOP CALC LVOT STROKE VOLUME: 75.67 CM3
DOP CALCLVOT PEAK VEL VTI: 21.05 CM
E WAVE DECELERATION TIME: 223.45 MSEC
E/A RATIO: 0.82
E/E' RATIO: 5.31 M/S
ECHO LV POSTERIOR WALL: 0.68 CM (ref 0.6–1.1)
EJECTION FRACTION: 55 %
EOSINOPHIL # BLD AUTO: 0.4 K/UL (ref 0–0.5)
EOSINOPHIL NFR BLD: 4.8 % (ref 0–8)
ERYTHROCYTE [DISTWIDTH] IN BLOOD BY AUTOMATED COUNT: 13.8 % (ref 11.5–14.5)
EST. GFR  (AFRICAN AMERICAN): >60 ML/MIN/1.73 M^2
EST. GFR  (NON AFRICAN AMERICAN): >60 ML/MIN/1.73 M^2
FRACTIONAL SHORTENING: 31 % (ref 28–44)
GLUCOSE SERPL-MCNC: 88 MG/DL (ref 70–110)
HCT VFR BLD AUTO: 43.4 % (ref 37–48.5)
HCV AB SERPL QL IA: NEGATIVE
HGB BLD-MCNC: 14.1 G/DL (ref 12–16)
HIV 1+2 AB+HIV1 P24 AG SERPL QL IA: NEGATIVE
IMM GRANULOCYTES # BLD AUTO: 0.03 K/UL (ref 0–0.04)
IMM GRANULOCYTES NFR BLD AUTO: 0.4 % (ref 0–0.5)
INTERVENTRICULAR SEPTUM: 0.72 CM (ref 0.6–1.1)
IVRT: 97.05 MSEC
LA MAJOR: 5.56 CM
LA MINOR: 5.52 CM
LA WIDTH: 4.68 CM
LEFT ATRIUM SIZE: 3.74 CM
LEFT ATRIUM VOLUME INDEX MOD: 46.8 ML/M2
LEFT ATRIUM VOLUME INDEX: 40.6 ML/M2
LEFT ATRIUM VOLUME MOD: 94.99 CM3
LEFT ATRIUM VOLUME: 82.42 CM3
LEFT INTERNAL DIMENSION IN SYSTOLE: 3.71 CM (ref 2.1–4)
LEFT VENTRICLE DIASTOLIC VOLUME INDEX: 69.65 ML/M2
LEFT VENTRICLE DIASTOLIC VOLUME: 141.38 ML
LEFT VENTRICLE MASS INDEX: 65 G/M2
LEFT VENTRICLE SYSTOLIC VOLUME INDEX: 28.8 ML/M2
LEFT VENTRICLE SYSTOLIC VOLUME: 58.51 ML
LEFT VENTRICULAR INTERNAL DIMENSION IN DIASTOLE: 5.4 CM (ref 3.5–6)
LEFT VENTRICULAR MASS: 131.2 G
LV LATERAL E/E' RATIO: 4.31 M/S
LV SEPTAL E/E' RATIO: 6.9 M/S
LYMPHOCYTES # BLD AUTO: 3.3 K/UL (ref 1–4.8)
LYMPHOCYTES NFR BLD: 40 % (ref 18–48)
MAGNESIUM SERPL-MCNC: 1.7 MG/DL (ref 1.6–2.6)
MCH RBC QN AUTO: 30.5 PG (ref 27–31)
MCHC RBC AUTO-ENTMCNC: 32.5 G/DL (ref 32–36)
MCV RBC AUTO: 94 FL (ref 82–98)
MONOCYTES # BLD AUTO: 0.7 K/UL (ref 0.3–1)
MONOCYTES NFR BLD: 7.9 % (ref 4–15)
MV A" WAVE DURATION": 7.71 MSEC
MV PEAK A VEL: 0.84 M/S
MV PEAK E VEL: 0.69 M/S
MV STENOSIS PRESSURE HALF TIME: 64.8 MS
MV VALVE AREA P 1/2 METHOD: 3.4 CM2
NEUTROPHILS # BLD AUTO: 3.8 K/UL (ref 1.8–7.7)
NEUTROPHILS NFR BLD: 46.3 % (ref 38–73)
NRBC BLD-RTO: 0 /100 WBC
PHOSPHATE SERPL-MCNC: 4 MG/DL (ref 2.7–4.5)
PLATELET # BLD AUTO: 252 K/UL (ref 150–450)
PLATELET BLD QL SMEAR: ABNORMAL
PMV BLD AUTO: 8.9 FL (ref 9.2–12.9)
POCT GLUCOSE: 105 MG/DL (ref 70–110)
POCT GLUCOSE: 78 MG/DL (ref 70–110)
POTASSIUM SERPL-SCNC: 4.1 MMOL/L (ref 3.5–5.1)
PROT SERPL-MCNC: 6.6 G/DL (ref 6–8.4)
PULM VEIN S/D RATIO: 1.22
PV PEAK D VEL: 0.37 M/S
PV PEAK S VEL: 0.45 M/S
RA MAJOR: 4.74 CM
RA PRESSURE: 3 MMHG
RA WIDTH: 4.32 CM
RBC # BLD AUTO: 4.63 M/UL (ref 4–5.4)
RIGHT VENTRICULAR END-DIASTOLIC DIMENSION: 3.71 CM
RV TISSUE DOPPLER FREE WALL SYSTOLIC VELOCITY 1 (APICAL 4 CHAMBER VIEW): 13.68 CM/S
SINUS: 3.18 CM
SODIUM SERPL-SCNC: 140 MMOL/L (ref 136–145)
STJ: 2.62 CM
TDI LATERAL: 0.16 M/S
TDI SEPTAL: 0.1 M/S
TDI: 0.13 M/S
TRICUSPID ANNULAR PLANE SYSTOLIC EXCURSION: 2.34 CM
WBC # BLD AUTO: 8.21 K/UL (ref 3.9–12.7)

## 2021-05-24 PROCEDURE — 25000003 PHARM REV CODE 250: Performed by: NURSE PRACTITIONER

## 2021-05-24 PROCEDURE — 85025 COMPLETE CBC W/AUTO DIFF WBC: CPT | Performed by: PHYSICIAN ASSISTANT

## 2021-05-24 PROCEDURE — 97161 PT EVAL LOW COMPLEX 20 MIN: CPT

## 2021-05-24 PROCEDURE — S4991 NICOTINE PATCH NONLEGEND: HCPCS | Performed by: NURSE PRACTITIONER

## 2021-05-24 PROCEDURE — 80053 COMPREHEN METABOLIC PANEL: CPT | Performed by: PHYSICIAN ASSISTANT

## 2021-05-24 PROCEDURE — 83735 ASSAY OF MAGNESIUM: CPT | Performed by: PHYSICIAN ASSISTANT

## 2021-05-24 PROCEDURE — 63600175 PHARM REV CODE 636 W HCPCS: Performed by: PHYSICIAN ASSISTANT

## 2021-05-24 PROCEDURE — 25000003 PHARM REV CODE 250: Performed by: PHYSICIAN ASSISTANT

## 2021-05-24 PROCEDURE — 84100 ASSAY OF PHOSPHORUS: CPT | Performed by: PHYSICIAN ASSISTANT

## 2021-05-24 PROCEDURE — 25500020 PHARM REV CODE 255: Performed by: PSYCHIATRY & NEUROLOGY

## 2021-05-24 PROCEDURE — 99238 PR HOSPITAL DISCHARGE DAY,<30 MIN: ICD-10-PCS | Mod: ,,, | Performed by: PSYCHIATRY & NEUROLOGY

## 2021-05-24 PROCEDURE — 99238 HOSP IP/OBS DSCHRG MGMT 30/<: CPT | Mod: ,,, | Performed by: PSYCHIATRY & NEUROLOGY

## 2021-05-24 RX ORDER — ATORVASTATIN CALCIUM 40 MG/1
40 TABLET, FILM COATED ORAL DAILY
Qty: 90 TABLET | Refills: 3 | Status: SHIPPED | OUTPATIENT
Start: 2021-05-25 | End: 2021-09-24 | Stop reason: SDUPTHER

## 2021-05-24 RX ORDER — NAPROXEN SODIUM 220 MG/1
81 TABLET, FILM COATED ORAL DAILY
Qty: 30 TABLET | Refills: 0 | Status: SHIPPED | OUTPATIENT
Start: 2021-05-25 | End: 2023-08-09

## 2021-05-24 RX ORDER — CLOPIDOGREL BISULFATE 75 MG/1
75 TABLET ORAL DAILY
Qty: 30 TABLET | Refills: 11 | Status: SHIPPED | OUTPATIENT
Start: 2021-05-25 | End: 2021-07-22

## 2021-05-24 RX ORDER — CLOPIDOGREL BISULFATE 75 MG/1
75 TABLET ORAL DAILY
Status: DISCONTINUED | OUTPATIENT
Start: 2021-05-24 | End: 2021-05-24 | Stop reason: HOSPADM

## 2021-05-24 RX ORDER — NAPROXEN SODIUM 220 MG/1
81 TABLET, FILM COATED ORAL DAILY
Status: DISCONTINUED | OUTPATIENT
Start: 2021-05-24 | End: 2021-05-24 | Stop reason: HOSPADM

## 2021-05-24 RX ORDER — IBUPROFEN 200 MG
1 TABLET ORAL DAILY
Qty: 7 PATCH | Refills: 0 | Status: SHIPPED | OUTPATIENT
Start: 2021-05-25 | End: 2021-05-24 | Stop reason: HOSPADM

## 2021-05-24 RX ORDER — HEPARIN SODIUM 5000 [USP'U]/ML
5000 INJECTION, SOLUTION INTRAVENOUS; SUBCUTANEOUS EVERY 8 HOURS
Status: DISCONTINUED | OUTPATIENT
Start: 2021-05-24 | End: 2021-05-24 | Stop reason: HOSPADM

## 2021-05-24 RX ADMIN — NICOTINE 1 PATCH: 21 PATCH, EXTENDED RELEASE TRANSDERMAL at 09:05

## 2021-05-24 RX ADMIN — FAMOTIDINE 20 MG: 20 TABLET ORAL at 09:05

## 2021-05-24 RX ADMIN — HUMAN ALBUMIN MICROSPHERES AND PERFLUTREN 0.66 MG: 10; .22 INJECTION, SOLUTION INTRAVENOUS at 10:05

## 2021-05-24 RX ADMIN — ATORVASTATIN CALCIUM 40 MG: 20 TABLET, FILM COATED ORAL at 09:05

## 2021-05-24 RX ADMIN — ASPIRIN 81 MG: 81 TABLET, CHEWABLE ORAL at 09:05

## 2021-05-24 RX ADMIN — MUPIROCIN: 20 OINTMENT TOPICAL at 09:05

## 2021-05-24 RX ADMIN — SODIUM CHLORIDE: 0.9 INJECTION, SOLUTION INTRAVENOUS at 03:05

## 2021-05-24 RX ADMIN — HEPARIN SODIUM 5000 UNITS: 5000 INJECTION INTRAVENOUS; SUBCUTANEOUS at 06:05

## 2021-05-24 RX ADMIN — CLOPIDOGREL 75 MG: 75 TABLET, FILM COATED ORAL at 09:05

## 2021-05-26 ENCOUNTER — PATIENT OUTREACH (OUTPATIENT)
Dept: ADMINISTRATIVE | Facility: CLINIC | Age: 61
End: 2021-05-26

## 2021-05-26 ENCOUNTER — CLINICAL SUPPORT (OUTPATIENT)
Dept: SMOKING CESSATION | Facility: CLINIC | Age: 61
End: 2021-05-26
Payer: COMMERCIAL

## 2021-05-26 DIAGNOSIS — F17.200 NICOTINE DEPENDENCE: ICD-10-CM

## 2021-05-26 DIAGNOSIS — I63.412 EMBOLIC STROKE INVOLVING LEFT MIDDLE CEREBRAL ARTERY: Primary | ICD-10-CM

## 2021-05-26 PROCEDURE — 99999 PR PBB SHADOW E&M-EST. PATIENT-LVL I: CPT | Mod: PBBFAC,,,

## 2021-05-26 PROCEDURE — 99404 PREV MED CNSL INDIV APPRX 60: CPT | Mod: S$GLB,,,

## 2021-05-26 PROCEDURE — 99999 PR PBB SHADOW E&M-EST. PATIENT-LVL I: ICD-10-PCS | Mod: PBBFAC,,,

## 2021-05-26 PROCEDURE — 99404 PR PREVENT COUNSEL,INDIV,60 MIN: ICD-10-PCS | Mod: S$GLB,,,

## 2021-05-26 RX ORDER — IBUPROFEN 200 MG
1 TABLET ORAL DAILY
Qty: 14 PATCH | Refills: 0 | Status: SHIPPED | OUTPATIENT
Start: 2021-05-26 | End: 2021-06-21

## 2021-05-31 ENCOUNTER — CLINICAL SUPPORT (OUTPATIENT)
Dept: CARDIOLOGY | Facility: HOSPITAL | Age: 61
End: 2021-05-31
Attending: PHYSICIAN ASSISTANT
Payer: COMMERCIAL

## 2021-05-31 DIAGNOSIS — I63.9 CEREBROVASCULAR ACCIDENT (CVA), UNSPECIFIED MECHANISM: ICD-10-CM

## 2021-05-31 DIAGNOSIS — I63.412 EMBOLIC STROKE INVOLVING LEFT MIDDLE CEREBRAL ARTERY: ICD-10-CM

## 2021-05-31 PROCEDURE — 93272 ECG/REVIEW INTERPRET ONLY: CPT | Mod: ,,, | Performed by: STUDENT IN AN ORGANIZED HEALTH CARE EDUCATION/TRAINING PROGRAM

## 2021-05-31 PROCEDURE — 93272 CARDIAC EVENT MONITOR (CUPID ONLY): ICD-10-PCS | Mod: ,,, | Performed by: STUDENT IN AN ORGANIZED HEALTH CARE EDUCATION/TRAINING PROGRAM

## 2021-05-31 PROCEDURE — 93271 ECG/MONITORING AND ANALYSIS: CPT

## 2021-06-04 ENCOUNTER — TELEPHONE (OUTPATIENT)
Dept: NEUROLOGY | Facility: CLINIC | Age: 61
End: 2021-06-04

## 2021-06-16 ENCOUNTER — TELEPHONE (OUTPATIENT)
Dept: SMOKING CESSATION | Facility: CLINIC | Age: 61
End: 2021-06-16

## 2021-06-21 ENCOUNTER — OFFICE VISIT (OUTPATIENT)
Dept: OBSTETRICS AND GYNECOLOGY | Facility: CLINIC | Age: 61
End: 2021-06-21
Payer: MEDICARE

## 2021-06-21 VITALS
WEIGHT: 204.13 LBS | SYSTOLIC BLOOD PRESSURE: 128 MMHG | DIASTOLIC BLOOD PRESSURE: 80 MMHG | BODY MASS INDEX: 31.97 KG/M2

## 2021-06-21 DIAGNOSIS — Z01.419 ROUTINE GYNECOLOGICAL EXAMINATION: Primary | ICD-10-CM

## 2021-06-21 DIAGNOSIS — Z12.4 CERVICAL CANCER SCREENING: ICD-10-CM

## 2021-06-21 PROCEDURE — 1126F PR PAIN SEVERITY QUANTIFIED, NO PAIN PRESENT: ICD-10-PCS | Mod: S$GLB,,, | Performed by: OBSTETRICS & GYNECOLOGY

## 2021-06-21 PROCEDURE — 99999 PR PBB SHADOW E&M-EST. PATIENT-LVL II: ICD-10-PCS | Mod: PBBFAC,,, | Performed by: OBSTETRICS & GYNECOLOGY

## 2021-06-21 PROCEDURE — 3008F PR BODY MASS INDEX (BMI) DOCUMENTED: ICD-10-PCS | Mod: CPTII,S$GLB,, | Performed by: OBSTETRICS & GYNECOLOGY

## 2021-06-21 PROCEDURE — 88141 CYTOPATH C/V INTERPRET: CPT | Mod: ,,, | Performed by: PATHOLOGY

## 2021-06-21 PROCEDURE — 1111F DSCHRG MED/CURRENT MED MERGE: CPT | Mod: CPTII,S$GLB,, | Performed by: OBSTETRICS & GYNECOLOGY

## 2021-06-21 PROCEDURE — 1111F PR DISCHARGE MEDS RECONCILED W/ CURRENT OUTPATIENT MED LIST: ICD-10-PCS | Mod: CPTII,S$GLB,, | Performed by: OBSTETRICS & GYNECOLOGY

## 2021-06-21 PROCEDURE — 88141 PR  CYTOPATH CERV/VAG INTERPRET: ICD-10-PCS | Mod: ,,, | Performed by: PATHOLOGY

## 2021-06-21 PROCEDURE — 3008F BODY MASS INDEX DOCD: CPT | Mod: CPTII,S$GLB,, | Performed by: OBSTETRICS & GYNECOLOGY

## 2021-06-21 PROCEDURE — 99999 PR PBB SHADOW E&M-EST. PATIENT-LVL II: CPT | Mod: PBBFAC,,, | Performed by: OBSTETRICS & GYNECOLOGY

## 2021-06-21 PROCEDURE — G0101 PR CA SCREEN;PELVIC/BREAST EXAM: ICD-10-PCS | Mod: GZ,S$GLB,, | Performed by: OBSTETRICS & GYNECOLOGY

## 2021-06-21 PROCEDURE — G0101 CA SCREEN;PELVIC/BREAST EXAM: HCPCS | Mod: GZ,S$GLB,, | Performed by: OBSTETRICS & GYNECOLOGY

## 2021-06-21 PROCEDURE — 1126F AMNT PAIN NOTED NONE PRSNT: CPT | Mod: S$GLB,,, | Performed by: OBSTETRICS & GYNECOLOGY

## 2021-06-21 PROCEDURE — 88175 CYTOPATH C/V AUTO FLUID REDO: CPT | Performed by: PATHOLOGY

## 2021-06-23 ENCOUNTER — CLINICAL SUPPORT (OUTPATIENT)
Dept: SMOKING CESSATION | Facility: CLINIC | Age: 61
End: 2021-06-23
Payer: COMMERCIAL

## 2021-06-23 DIAGNOSIS — F17.200 NICOTINE DEPENDENCE: ICD-10-CM

## 2021-06-23 PROCEDURE — 99401 PR PREVENT COUNSEL,INDIV,15 MIN: ICD-10-PCS | Mod: S$GLB,,,

## 2021-06-23 PROCEDURE — 99401 PREV MED CNSL INDIV APPRX 15: CPT | Mod: S$GLB,,,

## 2021-06-23 PROCEDURE — 99999 PR PBB SHADOW E&M-EST. PATIENT-LVL I: CPT | Mod: PBBFAC,,,

## 2021-06-23 PROCEDURE — 99999 PR PBB SHADOW E&M-EST. PATIENT-LVL I: ICD-10-PCS | Mod: PBBFAC,,,

## 2021-06-23 RX ORDER — IBUPROFEN 200 MG
1 TABLET ORAL DAILY
Qty: 28 PATCH | Refills: 0 | Status: SHIPPED | OUTPATIENT
Start: 2021-06-23 | End: 2022-01-12 | Stop reason: SDUPTHER

## 2021-06-25 ENCOUNTER — OFFICE VISIT (OUTPATIENT)
Dept: FAMILY MEDICINE | Facility: CLINIC | Age: 61
End: 2021-06-25
Payer: MEDICARE

## 2021-06-25 VITALS
HEIGHT: 67 IN | SYSTOLIC BLOOD PRESSURE: 110 MMHG | TEMPERATURE: 99 F | WEIGHT: 203.94 LBS | OXYGEN SATURATION: 96 % | DIASTOLIC BLOOD PRESSURE: 60 MMHG | BODY MASS INDEX: 32.01 KG/M2 | HEART RATE: 56 BPM

## 2021-06-25 DIAGNOSIS — I25.2 HISTORY OF MYOCARDIAL INFARCTION: ICD-10-CM

## 2021-06-25 DIAGNOSIS — E78.2 MIXED HYPERLIPIDEMIA: ICD-10-CM

## 2021-06-25 DIAGNOSIS — F17.200 SMOKER: ICD-10-CM

## 2021-06-25 DIAGNOSIS — I63.412 EMBOLIC STROKE INVOLVING LEFT MIDDLE CEREBRAL ARTERY: Primary | ICD-10-CM

## 2021-06-25 PROCEDURE — 1126F AMNT PAIN NOTED NONE PRSNT: CPT | Mod: S$GLB,,, | Performed by: INTERNAL MEDICINE

## 2021-06-25 PROCEDURE — 1126F PR PAIN SEVERITY QUANTIFIED, NO PAIN PRESENT: ICD-10-PCS | Mod: S$GLB,,, | Performed by: INTERNAL MEDICINE

## 2021-06-25 PROCEDURE — 3008F PR BODY MASS INDEX (BMI) DOCUMENTED: ICD-10-PCS | Mod: CPTII,S$GLB,, | Performed by: INTERNAL MEDICINE

## 2021-06-25 PROCEDURE — 99999 PR PBB SHADOW E&M-EST. PATIENT-LVL IV: CPT | Mod: PBBFAC,,, | Performed by: INTERNAL MEDICINE

## 2021-06-25 PROCEDURE — 99204 OFFICE O/P NEW MOD 45 MIN: CPT | Mod: S$GLB,,, | Performed by: INTERNAL MEDICINE

## 2021-06-25 PROCEDURE — 99499 UNLISTED E&M SERVICE: CPT | Mod: S$GLB,,, | Performed by: INTERNAL MEDICINE

## 2021-06-25 PROCEDURE — 99999 PR PBB SHADOW E&M-EST. PATIENT-LVL IV: ICD-10-PCS | Mod: PBBFAC,,, | Performed by: INTERNAL MEDICINE

## 2021-06-25 PROCEDURE — 99204 PR OFFICE/OUTPT VISIT, NEW, LEVL IV, 45-59 MIN: ICD-10-PCS | Mod: S$GLB,,, | Performed by: INTERNAL MEDICINE

## 2021-06-25 PROCEDURE — 3008F BODY MASS INDEX DOCD: CPT | Mod: CPTII,S$GLB,, | Performed by: INTERNAL MEDICINE

## 2021-06-25 PROCEDURE — 99499 RISK ADDL DX/OHS AUDIT: ICD-10-PCS | Mod: S$GLB,,, | Performed by: INTERNAL MEDICINE

## 2021-06-26 ENCOUNTER — LAB VISIT (OUTPATIENT)
Dept: LAB | Facility: HOSPITAL | Age: 61
End: 2021-06-26
Attending: INTERNAL MEDICINE
Payer: MEDICARE

## 2021-06-26 DIAGNOSIS — I21.9 ACUTE MYOCARDIAL INFARCTION, UNSPECIFIED: ICD-10-CM

## 2021-06-26 DIAGNOSIS — I63.412 EMBOLIC STROKE INVOLVING LEFT MIDDLE CEREBRAL ARTERY: ICD-10-CM

## 2021-06-26 LAB
ALBUMIN SERPL BCP-MCNC: 3.9 G/DL (ref 3.5–5.2)
ALP SERPL-CCNC: 125 U/L (ref 55–135)
ALT SERPL W/O P-5'-P-CCNC: 21 U/L (ref 10–44)
ANION GAP SERPL CALC-SCNC: 9 MMOL/L (ref 8–16)
AST SERPL-CCNC: 21 U/L (ref 10–40)
BILIRUB SERPL-MCNC: 0.5 MG/DL (ref 0.1–1)
BUN SERPL-MCNC: 13 MG/DL (ref 6–20)
CALCIUM SERPL-MCNC: 9.4 MG/DL (ref 8.7–10.5)
CHLORIDE SERPL-SCNC: 109 MMOL/L (ref 95–110)
CHOLEST SERPL-MCNC: 130 MG/DL (ref 120–199)
CHOLEST/HDLC SERPL: 4.3 {RATIO} (ref 2–5)
CO2 SERPL-SCNC: 25 MMOL/L (ref 23–29)
CREAT SERPL-MCNC: 1 MG/DL (ref 0.5–1.4)
EST. GFR  (AFRICAN AMERICAN): >60 ML/MIN/1.73 M^2
EST. GFR  (NON AFRICAN AMERICAN): >60 ML/MIN/1.73 M^2
ESTIMATED AVG GLUCOSE: 114 MG/DL (ref 68–131)
GLUCOSE SERPL-MCNC: 102 MG/DL (ref 70–110)
HBA1C MFR BLD: 5.6 % (ref 4–5.6)
HDLC SERPL-MCNC: 30 MG/DL (ref 40–75)
HDLC SERPL: 23.1 % (ref 20–50)
LDLC SERPL CALC-MCNC: 86.8 MG/DL (ref 63–159)
NONHDLC SERPL-MCNC: 100 MG/DL
POTASSIUM SERPL-SCNC: 4.6 MMOL/L (ref 3.5–5.1)
PROT SERPL-MCNC: 7.3 G/DL (ref 6–8.4)
SODIUM SERPL-SCNC: 143 MMOL/L (ref 136–145)
TRIGL SERPL-MCNC: 66 MG/DL (ref 30–150)

## 2021-06-26 PROCEDURE — 83036 HEMOGLOBIN GLYCOSYLATED A1C: CPT | Performed by: INTERNAL MEDICINE

## 2021-06-26 PROCEDURE — 80061 LIPID PANEL: CPT | Performed by: INTERNAL MEDICINE

## 2021-06-26 PROCEDURE — 80053 COMPREHEN METABOLIC PANEL: CPT | Performed by: INTERNAL MEDICINE

## 2021-06-26 PROCEDURE — 36415 COLL VENOUS BLD VENIPUNCTURE: CPT | Performed by: INTERNAL MEDICINE

## 2021-06-29 ENCOUNTER — TELEPHONE (OUTPATIENT)
Dept: FAMILY MEDICINE | Facility: CLINIC | Age: 61
End: 2021-06-29

## 2021-06-29 ENCOUNTER — NURSE TRIAGE (OUTPATIENT)
Dept: ADMINISTRATIVE | Facility: CLINIC | Age: 61
End: 2021-06-29

## 2021-06-29 RX ORDER — ALBUTEROL SULFATE 90 UG/1
2 AEROSOL, METERED RESPIRATORY (INHALATION) EVERY 6 HOURS PRN
Qty: 18 G | Refills: 5 | Status: SHIPPED | OUTPATIENT
Start: 2021-06-29 | End: 2023-02-08

## 2021-06-30 ENCOUNTER — CLINICAL SUPPORT (OUTPATIENT)
Dept: SMOKING CESSATION | Facility: CLINIC | Age: 61
End: 2021-06-30
Payer: COMMERCIAL

## 2021-06-30 ENCOUNTER — OFFICE VISIT (OUTPATIENT)
Dept: CARDIOLOGY | Facility: CLINIC | Age: 61
End: 2021-06-30
Payer: MEDICARE

## 2021-06-30 VITALS
OXYGEN SATURATION: 97 % | HEIGHT: 67 IN | BODY MASS INDEX: 31.99 KG/M2 | HEART RATE: 72 BPM | WEIGHT: 203.81 LBS | DIASTOLIC BLOOD PRESSURE: 69 MMHG | SYSTOLIC BLOOD PRESSURE: 100 MMHG

## 2021-06-30 DIAGNOSIS — Z12.11 COLON CANCER SCREENING: ICD-10-CM

## 2021-06-30 DIAGNOSIS — F17.200 SMOKER: ICD-10-CM

## 2021-06-30 DIAGNOSIS — I63.412 EMBOLIC STROKE INVOLVING LEFT MIDDLE CEREBRAL ARTERY: ICD-10-CM

## 2021-06-30 DIAGNOSIS — E78.2 MIXED HYPERLIPIDEMIA: Primary | ICD-10-CM

## 2021-06-30 DIAGNOSIS — F17.200 NICOTINE DEPENDENCE: ICD-10-CM

## 2021-06-30 LAB
COMMENT: ABNORMAL
FINAL PATHOLOGIC DIAGNOSIS: ABNORMAL
Lab: ABNORMAL

## 2021-06-30 PROCEDURE — 99204 PR OFFICE/OUTPT VISIT, NEW, LEVL IV, 45-59 MIN: ICD-10-PCS | Mod: S$GLB,,, | Performed by: INTERNAL MEDICINE

## 2021-06-30 PROCEDURE — 1126F AMNT PAIN NOTED NONE PRSNT: CPT | Mod: S$GLB,,, | Performed by: INTERNAL MEDICINE

## 2021-06-30 PROCEDURE — 1126F PR PAIN SEVERITY QUANTIFIED, NO PAIN PRESENT: ICD-10-PCS | Mod: S$GLB,,, | Performed by: INTERNAL MEDICINE

## 2021-06-30 PROCEDURE — 3008F BODY MASS INDEX DOCD: CPT | Mod: CPTII,S$GLB,, | Performed by: INTERNAL MEDICINE

## 2021-06-30 PROCEDURE — 99999 PR PBB SHADOW E&M-EST. PATIENT-LVL III: ICD-10-PCS | Mod: PBBFAC,,, | Performed by: INTERNAL MEDICINE

## 2021-06-30 PROCEDURE — 99402 PREV MED CNSL INDIV APPRX 30: CPT | Mod: S$GLB,,,

## 2021-06-30 PROCEDURE — 99402 PR PREVENT COUNSEL,INDIV,30 MIN: ICD-10-PCS | Mod: S$GLB,,,

## 2021-06-30 PROCEDURE — 99204 OFFICE O/P NEW MOD 45 MIN: CPT | Mod: S$GLB,,, | Performed by: INTERNAL MEDICINE

## 2021-06-30 PROCEDURE — 99999 PR PBB SHADOW E&M-EST. PATIENT-LVL I: ICD-10-PCS | Mod: PBBFAC,,,

## 2021-06-30 PROCEDURE — 3008F PR BODY MASS INDEX (BMI) DOCUMENTED: ICD-10-PCS | Mod: CPTII,S$GLB,, | Performed by: INTERNAL MEDICINE

## 2021-06-30 PROCEDURE — 99999 PR PBB SHADOW E&M-EST. PATIENT-LVL I: CPT | Mod: PBBFAC,,,

## 2021-06-30 PROCEDURE — 99999 PR PBB SHADOW E&M-EST. PATIENT-LVL III: CPT | Mod: PBBFAC,,, | Performed by: INTERNAL MEDICINE

## 2021-07-06 ENCOUNTER — HOSPITAL ENCOUNTER (OUTPATIENT)
Dept: CARDIOLOGY | Facility: HOSPITAL | Age: 61
Discharge: HOME | End: 2021-07-06
Attending: INTERNAL MEDICINE
Payer: MEDICARE

## 2021-07-06 VITALS — BODY MASS INDEX: 31.86 KG/M2 | HEIGHT: 67 IN | WEIGHT: 203 LBS

## 2021-07-06 DIAGNOSIS — I63.412 EMBOLIC STROKE INVOLVING LEFT MIDDLE CEREBRAL ARTERY: ICD-10-CM

## 2021-07-06 LAB
AORTIC ROOT ANNULUS: 3.17 CM
ASCENDING AORTA: 2.56 CM
BSA FOR ECHO PROCEDURE: 2.09 M2
CV ECHO LV RWT: 0.33 CM
DOP CALC LVOT AREA: 4 CM2
DOP CALC LVOT DIAMETER: 2.25 CM
ECHO LV POSTERIOR WALL: 0.8 CM (ref 0.6–1.1)
EJECTION FRACTION: 55 %
FRACTIONAL SHORTENING: 29 % (ref 28–44)
INTERVENTRICULAR SEPTUM: 0.79 CM (ref 0.6–1.1)
LA MAJOR: 5.27 CM
LA MINOR: 5.12 CM
LA WIDTH: 3.71 CM
LEFT ATRIUM SIZE: 3.21 CM
LEFT ATRIUM VOLUME INDEX MOD: 20.5 ML/M2
LEFT ATRIUM VOLUME INDEX: 25.9 ML/M2
LEFT ATRIUM VOLUME MOD: 41.52 CM3
LEFT ATRIUM VOLUME: 52.58 CM3
LEFT INTERNAL DIMENSION IN SYSTOLE: 3.43 CM (ref 2.1–4)
LEFT VENTRICLE DIASTOLIC VOLUME INDEX: 53.65 ML/M2
LEFT VENTRICLE DIASTOLIC VOLUME: 108.9 ML
LEFT VENTRICLE MASS INDEX: 63 G/M2
LEFT VENTRICLE SYSTOLIC VOLUME INDEX: 23.9 ML/M2
LEFT VENTRICLE SYSTOLIC VOLUME: 48.49 ML
LEFT VENTRICULAR INTERNAL DIMENSION IN DIASTOLE: 4.83 CM (ref 3.5–6)
LEFT VENTRICULAR MASS: 127.01 G
RA MAJOR: 4.28 CM
RA PRESSURE: 3 MMHG
RA WIDTH: 3.5 CM
RIGHT VENTRICULAR END-DIASTOLIC DIMENSION: 2.93 CM
STJ: 2.62 CM

## 2021-07-06 PROCEDURE — 93308 ECHO (CUPID ONLY): ICD-10-PCS | Mod: 26,,, | Performed by: INTERNAL MEDICINE

## 2021-07-06 PROCEDURE — C8924 2D TTE W OR W/O FOL W/CON,FU: HCPCS

## 2021-07-06 PROCEDURE — 93308 TTE F-UP OR LMTD: CPT | Mod: 26,,, | Performed by: INTERNAL MEDICINE

## 2021-07-07 ENCOUNTER — TELEPHONE (OUTPATIENT)
Dept: OBSTETRICS AND GYNECOLOGY | Facility: CLINIC | Age: 61
End: 2021-07-07

## 2021-07-15 ENCOUNTER — OFFICE VISIT (OUTPATIENT)
Dept: CARDIOLOGY | Facility: CLINIC | Age: 61
End: 2021-07-15
Payer: MEDICARE

## 2021-07-15 VITALS
HEART RATE: 66 BPM | WEIGHT: 200 LBS | DIASTOLIC BLOOD PRESSURE: 64 MMHG | SYSTOLIC BLOOD PRESSURE: 92 MMHG | BODY MASS INDEX: 32.14 KG/M2 | OXYGEN SATURATION: 99 % | HEIGHT: 66 IN

## 2021-07-15 DIAGNOSIS — I25.9 CHEST PAIN DUE TO MYOCARDIAL ISCHEMIA, UNSPECIFIED ISCHEMIC CHEST PAIN TYPE: Primary | ICD-10-CM

## 2021-07-15 DIAGNOSIS — E66.09 CLASS 1 OBESITY DUE TO EXCESS CALORIES WITHOUT SERIOUS COMORBIDITY WITH BODY MASS INDEX (BMI) OF 32.0 TO 32.9 IN ADULT: Chronic | ICD-10-CM

## 2021-07-15 DIAGNOSIS — Q21.12 PFO (PATENT FORAMEN OVALE): Chronic | ICD-10-CM

## 2021-07-15 DIAGNOSIS — R07.9 CHEST PAIN, UNSPECIFIED TYPE: ICD-10-CM

## 2021-07-15 DIAGNOSIS — I63.412 EMBOLIC STROKE INVOLVING LEFT MIDDLE CEREBRAL ARTERY: ICD-10-CM

## 2021-07-15 DIAGNOSIS — E78.2 MIXED HYPERLIPIDEMIA: ICD-10-CM

## 2021-07-15 PROBLEM — E66.811 CLASS 1 OBESITY DUE TO EXCESS CALORIES IN ADULT: Chronic | Status: ACTIVE | Noted: 2021-07-15

## 2021-07-15 PROCEDURE — 99499 RISK ADDL DX/OHS AUDIT: ICD-10-PCS | Mod: S$GLB,,, | Performed by: INTERNAL MEDICINE

## 2021-07-15 PROCEDURE — 99214 PR OFFICE/OUTPT VISIT, EST, LEVL IV, 30-39 MIN: ICD-10-PCS | Mod: S$GLB,,, | Performed by: INTERNAL MEDICINE

## 2021-07-15 PROCEDURE — 3008F BODY MASS INDEX DOCD: CPT | Mod: CPTII,S$GLB,, | Performed by: INTERNAL MEDICINE

## 2021-07-15 PROCEDURE — 99499 UNLISTED E&M SERVICE: CPT | Mod: S$GLB,,, | Performed by: INTERNAL MEDICINE

## 2021-07-15 PROCEDURE — 1126F PR PAIN SEVERITY QUANTIFIED, NO PAIN PRESENT: ICD-10-PCS | Mod: S$GLB,,, | Performed by: INTERNAL MEDICINE

## 2021-07-15 PROCEDURE — 3008F PR BODY MASS INDEX (BMI) DOCUMENTED: ICD-10-PCS | Mod: CPTII,S$GLB,, | Performed by: INTERNAL MEDICINE

## 2021-07-15 PROCEDURE — 99999 PR PBB SHADOW E&M-EST. PATIENT-LVL III: ICD-10-PCS | Mod: PBBFAC,,, | Performed by: INTERNAL MEDICINE

## 2021-07-15 PROCEDURE — 1126F AMNT PAIN NOTED NONE PRSNT: CPT | Mod: S$GLB,,, | Performed by: INTERNAL MEDICINE

## 2021-07-15 PROCEDURE — 99999 PR PBB SHADOW E&M-EST. PATIENT-LVL III: CPT | Mod: PBBFAC,,, | Performed by: INTERNAL MEDICINE

## 2021-07-15 PROCEDURE — 99214 OFFICE O/P EST MOD 30 MIN: CPT | Mod: S$GLB,,, | Performed by: INTERNAL MEDICINE

## 2021-07-22 ENCOUNTER — OFFICE VISIT (OUTPATIENT)
Dept: NEUROLOGY | Facility: CLINIC | Age: 61
End: 2021-07-22
Payer: MEDICARE

## 2021-07-22 VITALS
BODY MASS INDEX: 32.49 KG/M2 | WEIGHT: 202.19 LBS | DIASTOLIC BLOOD PRESSURE: 64 MMHG | HEART RATE: 63 BPM | HEIGHT: 66 IN | SYSTOLIC BLOOD PRESSURE: 105 MMHG

## 2021-07-22 DIAGNOSIS — I63.9 CEREBROVASCULAR ACCIDENT (CVA), UNSPECIFIED MECHANISM: ICD-10-CM

## 2021-07-22 DIAGNOSIS — G44.40 MEDICATION OVERUSE HEADACHE: ICD-10-CM

## 2021-07-22 DIAGNOSIS — Q21.12 PFO (PATENT FORAMEN OVALE): Primary | Chronic | ICD-10-CM

## 2021-07-22 DIAGNOSIS — E78.2 MIXED HYPERLIPIDEMIA: ICD-10-CM

## 2021-07-22 DIAGNOSIS — I63.412 EMBOLIC STROKE INVOLVING LEFT MIDDLE CEREBRAL ARTERY: ICD-10-CM

## 2021-07-22 DIAGNOSIS — F17.200 SMOKER: ICD-10-CM

## 2021-07-22 PROCEDURE — 99214 PR OFFICE/OUTPT VISIT, EST, LEVL IV, 30-39 MIN: ICD-10-PCS | Mod: GC,S$GLB,, | Performed by: PSYCHIATRY & NEUROLOGY

## 2021-07-22 PROCEDURE — 99214 OFFICE O/P EST MOD 30 MIN: CPT | Mod: GC,S$GLB,, | Performed by: PSYCHIATRY & NEUROLOGY

## 2021-07-22 PROCEDURE — 99999 PR PBB SHADOW E&M-EST. PATIENT-LVL III: ICD-10-PCS | Mod: PBBFAC,GC,, | Performed by: STUDENT IN AN ORGANIZED HEALTH CARE EDUCATION/TRAINING PROGRAM

## 2021-07-22 PROCEDURE — 99999 PR PBB SHADOW E&M-EST. PATIENT-LVL III: CPT | Mod: PBBFAC,GC,, | Performed by: STUDENT IN AN ORGANIZED HEALTH CARE EDUCATION/TRAINING PROGRAM

## 2021-07-22 RX ORDER — VARENICLINE TARTRATE 0.5 (11)-1
1 KIT ORAL 2 TIMES DAILY
Qty: 1 PACKAGE | Refills: 0 | Status: SHIPPED | OUTPATIENT
Start: 2021-07-22 | End: 2022-03-02

## 2021-07-23 ENCOUNTER — HOSPITAL ENCOUNTER (OUTPATIENT)
Dept: CARDIOLOGY | Facility: HOSPITAL | Age: 61
Discharge: HOME | End: 2021-07-23
Attending: INTERNAL MEDICINE
Payer: MEDICARE

## 2021-07-23 DIAGNOSIS — R07.9 CHEST PAIN, UNSPECIFIED TYPE: ICD-10-CM

## 2021-07-23 DIAGNOSIS — R94.39 ABNORMAL STRESS ECHO: Primary | ICD-10-CM

## 2021-07-23 DIAGNOSIS — I25.9 CHEST PAIN DUE TO MYOCARDIAL ISCHEMIA, UNSPECIFIED ISCHEMIC CHEST PAIN TYPE: ICD-10-CM

## 2021-07-23 LAB
CV STRESS BASE HR: 60 BPM
DIASTOLIC BLOOD PRESSURE: 91 MMHG
EJECTION FRACTION: 50 %
OHS CV CPX 1 MINUTE RECOVERY HEART RATE: 88 BPM
OHS CV CPX 85 PERCENT MAX PREDICTED HEART RATE MALE: 130
OHS CV CPX ESTIMATED METS: 7
OHS CV CPX MAX PREDICTED HEART RATE: 153
OHS CV CPX PATIENT IS FEMALE: 1
OHS CV CPX PATIENT IS MALE: 0
OHS CV CPX PEAK DIASTOLIC BLOOD PRESSURE: 75 MMHG
OHS CV CPX PEAK HEAR RATE: 150 BPM
OHS CV CPX PEAK RATE PRESSURE PRODUCT: NORMAL
OHS CV CPX PEAK SYSTOLIC BLOOD PRESSURE: 164 MMHG
OHS CV CPX PERCENT MAX PREDICTED HEART RATE ACHIEVED: 98
OHS CV CPX RATE PRESSURE PRODUCT PRESENTING: 8580
STRESS ANGINA INDEX: 0
STRESS ECHO POST EXERCISE DUR MIN: 6 MINUTES
STRESS ECHO POST EXERCISE DUR SEC: 19 SECONDS
SYSTOLIC BLOOD PRESSURE: 143 MMHG

## 2021-07-23 PROCEDURE — 93351 STRESS TTE COMPLETE: CPT | Mod: 26,,, | Performed by: INTERNAL MEDICINE

## 2021-07-23 PROCEDURE — 93351 STRESS ECHO (CUPID ONLY): ICD-10-PCS | Mod: 26,,, | Performed by: INTERNAL MEDICINE

## 2021-07-23 PROCEDURE — 93351 STRESS TTE COMPLETE: CPT

## 2021-07-28 ENCOUNTER — TELEPHONE (OUTPATIENT)
Dept: CARDIOLOGY | Facility: CLINIC | Age: 61
End: 2021-07-28

## 2021-08-02 ENCOUNTER — OFFICE VISIT (OUTPATIENT)
Dept: OBSTETRICS AND GYNECOLOGY | Facility: CLINIC | Age: 61
End: 2021-08-02
Payer: MEDICARE

## 2021-08-02 VITALS
DIASTOLIC BLOOD PRESSURE: 70 MMHG | SYSTOLIC BLOOD PRESSURE: 106 MMHG | BODY MASS INDEX: 33.34 KG/M2 | WEIGHT: 207.44 LBS | HEIGHT: 66 IN

## 2021-08-02 DIAGNOSIS — R87.619 ATYPICAL GLANDULAR CELLS OF UNDETERMINED SIGNIFICANCE (AGUS) ON CERVICAL PAP SMEAR: Primary | ICD-10-CM

## 2021-08-02 PROCEDURE — 1126F PR PAIN SEVERITY QUANTIFIED, NO PAIN PRESENT: ICD-10-PCS | Mod: CPTII,S$GLB,, | Performed by: OBSTETRICS & GYNECOLOGY

## 2021-08-02 PROCEDURE — 57454 BX/CURETT OF CERVIX W/SCOPE: CPT | Mod: ,,, | Performed by: OBSTETRICS & GYNECOLOGY

## 2021-08-02 PROCEDURE — 3074F PR MOST RECENT SYSTOLIC BLOOD PRESSURE < 130 MM HG: ICD-10-PCS | Mod: CPTII,S$GLB,, | Performed by: OBSTETRICS & GYNECOLOGY

## 2021-08-02 PROCEDURE — 3044F HG A1C LEVEL LT 7.0%: CPT | Mod: CPTII,S$GLB,, | Performed by: OBSTETRICS & GYNECOLOGY

## 2021-08-02 PROCEDURE — 3008F BODY MASS INDEX DOCD: CPT | Mod: CPTII,S$GLB,, | Performed by: OBSTETRICS & GYNECOLOGY

## 2021-08-02 PROCEDURE — 1159F MED LIST DOCD IN RCRD: CPT | Mod: CPTII,S$GLB,, | Performed by: OBSTETRICS & GYNECOLOGY

## 2021-08-02 PROCEDURE — 88305 TISSUE EXAM BY PATHOLOGIST: CPT | Mod: 59 | Performed by: STUDENT IN AN ORGANIZED HEALTH CARE EDUCATION/TRAINING PROGRAM

## 2021-08-02 PROCEDURE — 58110 PR ENDOMET BIOPSY DONE W/COLPOSCOPY: ICD-10-PCS | Mod: S$GLB,,, | Performed by: OBSTETRICS & GYNECOLOGY

## 2021-08-02 PROCEDURE — 99499 UNLISTED E&M SERVICE: CPT | Mod: S$GLB,,, | Performed by: OBSTETRICS & GYNECOLOGY

## 2021-08-02 PROCEDURE — 88342 IMHCHEM/IMCYTCHM 1ST ANTB: CPT | Performed by: STUDENT IN AN ORGANIZED HEALTH CARE EDUCATION/TRAINING PROGRAM

## 2021-08-02 PROCEDURE — 99999 PR PBB SHADOW E&M-EST. PATIENT-LVL III: CPT | Mod: PBBFAC,,, | Performed by: OBSTETRICS & GYNECOLOGY

## 2021-08-02 PROCEDURE — 3078F DIAST BP <80 MM HG: CPT | Mod: CPTII,S$GLB,, | Performed by: OBSTETRICS & GYNECOLOGY

## 2021-08-02 PROCEDURE — 88342 CHG IMMUNOCYTOCHEMISTRY: ICD-10-PCS | Mod: 26,,, | Performed by: STUDENT IN AN ORGANIZED HEALTH CARE EDUCATION/TRAINING PROGRAM

## 2021-08-02 PROCEDURE — 88305 TISSUE EXAM BY PATHOLOGIST: CPT | Mod: 26,,, | Performed by: STUDENT IN AN ORGANIZED HEALTH CARE EDUCATION/TRAINING PROGRAM

## 2021-08-02 PROCEDURE — 58110 BX DONE W/COLPOSCOPY ADD-ON: CPT | Mod: S$GLB,,, | Performed by: OBSTETRICS & GYNECOLOGY

## 2021-08-02 PROCEDURE — 1126F AMNT PAIN NOTED NONE PRSNT: CPT | Mod: CPTII,S$GLB,, | Performed by: OBSTETRICS & GYNECOLOGY

## 2021-08-02 PROCEDURE — 1159F PR MEDICATION LIST DOCUMENTED IN MEDICAL RECORD: ICD-10-PCS | Mod: CPTII,S$GLB,, | Performed by: OBSTETRICS & GYNECOLOGY

## 2021-08-02 PROCEDURE — 88305 TISSUE EXAM BY PATHOLOGIST: ICD-10-PCS | Mod: 26,,, | Performed by: STUDENT IN AN ORGANIZED HEALTH CARE EDUCATION/TRAINING PROGRAM

## 2021-08-02 PROCEDURE — 99499 NO LOS: ICD-10-PCS | Mod: S$GLB,,, | Performed by: OBSTETRICS & GYNECOLOGY

## 2021-08-02 PROCEDURE — 3044F PR MOST RECENT HEMOGLOBIN A1C LEVEL <7.0%: ICD-10-PCS | Mod: CPTII,S$GLB,, | Performed by: OBSTETRICS & GYNECOLOGY

## 2021-08-02 PROCEDURE — 99999 PR PBB SHADOW E&M-EST. PATIENT-LVL III: ICD-10-PCS | Mod: PBBFAC,,, | Performed by: OBSTETRICS & GYNECOLOGY

## 2021-08-02 PROCEDURE — 57454 PR COLPOSC,CERVIX W/ADJ VAG,W/BX & CURRETAG: ICD-10-PCS | Mod: ,,, | Performed by: OBSTETRICS & GYNECOLOGY

## 2021-08-02 PROCEDURE — 3008F PR BODY MASS INDEX (BMI) DOCUMENTED: ICD-10-PCS | Mod: CPTII,S$GLB,, | Performed by: OBSTETRICS & GYNECOLOGY

## 2021-08-02 PROCEDURE — 3078F PR MOST RECENT DIASTOLIC BLOOD PRESSURE < 80 MM HG: ICD-10-PCS | Mod: CPTII,S$GLB,, | Performed by: OBSTETRICS & GYNECOLOGY

## 2021-08-02 PROCEDURE — 3074F SYST BP LT 130 MM HG: CPT | Mod: CPTII,S$GLB,, | Performed by: OBSTETRICS & GYNECOLOGY

## 2021-08-02 PROCEDURE — 88342 IMHCHEM/IMCYTCHM 1ST ANTB: CPT | Mod: 26,,, | Performed by: STUDENT IN AN ORGANIZED HEALTH CARE EDUCATION/TRAINING PROGRAM

## 2021-08-06 LAB
FINAL PATHOLOGIC DIAGNOSIS: NORMAL
GROSS: NORMAL
Lab: NORMAL

## 2021-08-09 ENCOUNTER — TELEPHONE (OUTPATIENT)
Dept: OBSTETRICS AND GYNECOLOGY | Facility: CLINIC | Age: 61
End: 2021-08-09

## 2021-08-09 ENCOUNTER — HOSPITAL ENCOUNTER (OUTPATIENT)
Dept: RADIOLOGY | Facility: HOSPITAL | Age: 61
Discharge: HOME | End: 2021-08-09
Attending: INTERNAL MEDICINE
Payer: MEDICARE

## 2021-08-09 ENCOUNTER — PATIENT MESSAGE (OUTPATIENT)
Dept: OBSTETRICS AND GYNECOLOGY | Facility: CLINIC | Age: 61
End: 2021-08-09

## 2021-08-09 VITALS
DIASTOLIC BLOOD PRESSURE: 53 MMHG | RESPIRATION RATE: 18 BRPM | SYSTOLIC BLOOD PRESSURE: 103 MMHG | OXYGEN SATURATION: 100 % | HEART RATE: 58 BPM

## 2021-08-09 DIAGNOSIS — Q21.12 PFO (PATENT FORAMEN OVALE): ICD-10-CM

## 2021-08-09 DIAGNOSIS — I25.9 CHEST PAIN DUE TO MYOCARDIAL ISCHEMIA, UNSPECIFIED ISCHEMIC CHEST PAIN TYPE: ICD-10-CM

## 2021-08-09 DIAGNOSIS — I63.9 CEREBROVASCULAR ACCIDENT (CVA), UNSPECIFIED MECHANISM: ICD-10-CM

## 2021-08-09 DIAGNOSIS — R94.39 ABNORMAL STRESS ECHO: ICD-10-CM

## 2021-08-09 PROCEDURE — 75574 CT ANGIO HRT W/3D IMAGE: CPT | Mod: TC

## 2021-08-09 PROCEDURE — 75574 CTA CARDIAC: ICD-10-PCS | Mod: 26,,, | Performed by: RADIOLOGY

## 2021-08-09 PROCEDURE — 75574 CT ANGIO HRT W/3D IMAGE: CPT | Mod: 26,,, | Performed by: RADIOLOGY

## 2021-08-09 PROCEDURE — 25000242 PHARM REV CODE 250 ALT 637 W/ HCPCS: Performed by: INTERNAL MEDICINE

## 2021-08-09 PROCEDURE — 25500020 PHARM REV CODE 255: Performed by: INTERNAL MEDICINE

## 2021-08-09 RX ORDER — NITROGLYCERIN 0.4 MG/1
0.4 TABLET SUBLINGUAL ONCE
Status: COMPLETED | OUTPATIENT
Start: 2021-08-09 | End: 2021-08-09

## 2021-08-09 RX ADMIN — IOHEXOL 100 ML: 350 INJECTION, SOLUTION INTRAVENOUS at 10:08

## 2021-08-09 RX ADMIN — NITROGLYCERIN 0.4 MG: 0.4 TABLET, ORALLY DISINTEGRATING SUBLINGUAL at 09:08

## 2021-08-10 ENCOUNTER — PATIENT MESSAGE (OUTPATIENT)
Dept: CARDIOLOGY | Facility: CLINIC | Age: 61
End: 2021-08-10

## 2021-08-10 ENCOUNTER — OFFICE VISIT (OUTPATIENT)
Dept: CARDIOLOGY | Facility: CLINIC | Age: 61
End: 2021-08-10
Payer: MEDICARE

## 2021-08-10 ENCOUNTER — DOCUMENTATION ONLY (OUTPATIENT)
Dept: CARDIOLOGY | Facility: CLINIC | Age: 61
End: 2021-08-10

## 2021-08-10 ENCOUNTER — LAB VISIT (OUTPATIENT)
Dept: LAB | Facility: HOSPITAL | Age: 61
End: 2021-08-10
Payer: MEDICARE

## 2021-08-10 VITALS
BODY MASS INDEX: 32.11 KG/M2 | HEART RATE: 51 BPM | WEIGHT: 204.56 LBS | HEIGHT: 67 IN | SYSTOLIC BLOOD PRESSURE: 102 MMHG | DIASTOLIC BLOOD PRESSURE: 67 MMHG | OXYGEN SATURATION: 98 %

## 2021-08-10 DIAGNOSIS — R79.1 ABNORMAL COAGULATION PROFILE: ICD-10-CM

## 2021-08-10 DIAGNOSIS — Q21.12 PFO (PATENT FORAMEN OVALE): ICD-10-CM

## 2021-08-10 DIAGNOSIS — E78.2 MIXED HYPERLIPIDEMIA: ICD-10-CM

## 2021-08-10 DIAGNOSIS — I63.9 CEREBROVASCULAR ACCIDENT (CVA), UNSPECIFIED MECHANISM: ICD-10-CM

## 2021-08-10 DIAGNOSIS — N18.9 CHRONIC KIDNEY DISEASE, UNSPECIFIED CKD STAGE: ICD-10-CM

## 2021-08-10 DIAGNOSIS — Q21.12 PFO (PATENT FORAMEN OVALE): Chronic | ICD-10-CM

## 2021-08-10 DIAGNOSIS — I63.412 EMBOLIC STROKE INVOLVING LEFT MIDDLE CEREBRAL ARTERY: Primary | ICD-10-CM

## 2021-08-10 DIAGNOSIS — I25.9 CHEST PAIN DUE TO MYOCARDIAL ISCHEMIA, UNSPECIFIED ISCHEMIC CHEST PAIN TYPE: ICD-10-CM

## 2021-08-10 DIAGNOSIS — E66.09 CLASS 1 OBESITY DUE TO EXCESS CALORIES WITHOUT SERIOUS COMORBIDITY WITH BODY MASS INDEX (BMI) OF 32.0 TO 32.9 IN ADULT: Chronic | ICD-10-CM

## 2021-08-10 DIAGNOSIS — Q21.12 PFO (PATENT FORAMEN OVALE): Primary | ICD-10-CM

## 2021-08-10 LAB
ABO + RH BLD: NORMAL
ANION GAP SERPL CALC-SCNC: 8 MMOL/L (ref 8–16)
APTT BLDCRRT: 23.9 SEC (ref 21–32)
BLD GP AB SCN CELLS X3 SERPL QL: NORMAL
BUN SERPL-MCNC: 17 MG/DL (ref 6–20)
CALCIUM SERPL-MCNC: 10.1 MG/DL (ref 8.7–10.5)
CHLORIDE SERPL-SCNC: 105 MMOL/L (ref 95–110)
CO2 SERPL-SCNC: 28 MMOL/L (ref 23–29)
CREAT SERPL-MCNC: 0.9 MG/DL (ref 0.5–1.4)
ERYTHROCYTE [DISTWIDTH] IN BLOOD BY AUTOMATED COUNT: 13.8 % (ref 11.5–14.5)
EST. GFR  (AFRICAN AMERICAN): >60 ML/MIN/1.73 M^2
EST. GFR  (NON AFRICAN AMERICAN): >60 ML/MIN/1.73 M^2
GLUCOSE SERPL-MCNC: 107 MG/DL (ref 70–110)
HCT VFR BLD AUTO: 41.6 % (ref 37–48.5)
HGB BLD-MCNC: 13.7 G/DL (ref 12–16)
INR PPP: 0.9 (ref 0.8–1.2)
MCH RBC QN AUTO: 30.9 PG (ref 27–31)
MCHC RBC AUTO-ENTMCNC: 32.9 G/DL (ref 32–36)
MCV RBC AUTO: 94 FL (ref 82–98)
PLATELET # BLD AUTO: 288 K/UL (ref 150–450)
PMV BLD AUTO: 9 FL (ref 9.2–12.9)
POTASSIUM SERPL-SCNC: 4.4 MMOL/L (ref 3.5–5.1)
PROTHROMBIN TIME: 10.4 SEC (ref 9–12.5)
RBC # BLD AUTO: 4.44 M/UL (ref 4–5.4)
SODIUM SERPL-SCNC: 141 MMOL/L (ref 136–145)
WBC # BLD AUTO: 7.13 K/UL (ref 3.9–12.7)

## 2021-08-10 PROCEDURE — 99999 PR PBB SHADOW E&M-EST. PATIENT-LVL III: CPT | Mod: PBBFAC,,, | Performed by: INTERNAL MEDICINE

## 2021-08-10 PROCEDURE — 3074F PR MOST RECENT SYSTOLIC BLOOD PRESSURE < 130 MM HG: ICD-10-PCS | Mod: CPTII,S$GLB,, | Performed by: INTERNAL MEDICINE

## 2021-08-10 PROCEDURE — 1159F PR MEDICATION LIST DOCUMENTED IN MEDICAL RECORD: ICD-10-PCS | Mod: CPTII,S$GLB,, | Performed by: INTERNAL MEDICINE

## 2021-08-10 PROCEDURE — 3078F PR MOST RECENT DIASTOLIC BLOOD PRESSURE < 80 MM HG: ICD-10-PCS | Mod: CPTII,S$GLB,, | Performed by: INTERNAL MEDICINE

## 2021-08-10 PROCEDURE — 3008F BODY MASS INDEX DOCD: CPT | Mod: CPTII,S$GLB,, | Performed by: INTERNAL MEDICINE

## 2021-08-10 PROCEDURE — 3044F PR MOST RECENT HEMOGLOBIN A1C LEVEL <7.0%: ICD-10-PCS | Mod: CPTII,S$GLB,, | Performed by: INTERNAL MEDICINE

## 2021-08-10 PROCEDURE — 1160F PR REVIEW ALL MEDS BY PRESCRIBER/CLIN PHARMACIST DOCUMENTED: ICD-10-PCS | Mod: CPTII,S$GLB,, | Performed by: INTERNAL MEDICINE

## 2021-08-10 PROCEDURE — 99205 PR OFFICE/OUTPT VISIT, NEW, LEVL V, 60-74 MIN: ICD-10-PCS | Mod: S$GLB,,, | Performed by: INTERNAL MEDICINE

## 2021-08-10 PROCEDURE — 1160F RVW MEDS BY RX/DR IN RCRD: CPT | Mod: CPTII,S$GLB,, | Performed by: INTERNAL MEDICINE

## 2021-08-10 PROCEDURE — 3008F PR BODY MASS INDEX (BMI) DOCUMENTED: ICD-10-PCS | Mod: CPTII,S$GLB,, | Performed by: INTERNAL MEDICINE

## 2021-08-10 PROCEDURE — 1126F PR PAIN SEVERITY QUANTIFIED, NO PAIN PRESENT: ICD-10-PCS | Mod: CPTII,S$GLB,, | Performed by: INTERNAL MEDICINE

## 2021-08-10 PROCEDURE — 3078F DIAST BP <80 MM HG: CPT | Mod: CPTII,S$GLB,, | Performed by: INTERNAL MEDICINE

## 2021-08-10 PROCEDURE — 86900 BLOOD TYPING SEROLOGIC ABO: CPT | Performed by: INTERNAL MEDICINE

## 2021-08-10 PROCEDURE — 3074F SYST BP LT 130 MM HG: CPT | Mod: CPTII,S$GLB,, | Performed by: INTERNAL MEDICINE

## 2021-08-10 PROCEDURE — 80048 BASIC METABOLIC PNL TOTAL CA: CPT | Performed by: INTERNAL MEDICINE

## 2021-08-10 PROCEDURE — 1159F MED LIST DOCD IN RCRD: CPT | Mod: CPTII,S$GLB,, | Performed by: INTERNAL MEDICINE

## 2021-08-10 PROCEDURE — 85610 PROTHROMBIN TIME: CPT | Performed by: INTERNAL MEDICINE

## 2021-08-10 PROCEDURE — 85027 COMPLETE CBC AUTOMATED: CPT | Performed by: INTERNAL MEDICINE

## 2021-08-10 PROCEDURE — 85730 THROMBOPLASTIN TIME PARTIAL: CPT | Performed by: INTERNAL MEDICINE

## 2021-08-10 PROCEDURE — 1126F AMNT PAIN NOTED NONE PRSNT: CPT | Mod: CPTII,S$GLB,, | Performed by: INTERNAL MEDICINE

## 2021-08-10 PROCEDURE — 99205 OFFICE O/P NEW HI 60 MIN: CPT | Mod: S$GLB,,, | Performed by: INTERNAL MEDICINE

## 2021-08-10 PROCEDURE — 99999 PR PBB SHADOW E&M-EST. PATIENT-LVL III: ICD-10-PCS | Mod: PBBFAC,,, | Performed by: INTERNAL MEDICINE

## 2021-08-10 PROCEDURE — 3044F HG A1C LEVEL LT 7.0%: CPT | Mod: CPTII,S$GLB,, | Performed by: INTERNAL MEDICINE

## 2021-08-10 RX ORDER — ASCORBIC ACID 250 MG
250 TABLET ORAL DAILY
COMMUNITY

## 2021-08-10 RX ORDER — ERGOCALCIFEROL 1.25 MG/1
1000 CAPSULE ORAL DAILY
COMMUNITY
End: 2022-07-26

## 2021-08-10 RX ORDER — SODIUM CHLORIDE 9 MG/ML
INJECTION, SOLUTION INTRAVENOUS CONTINUOUS
Status: CANCELLED | OUTPATIENT
Start: 2021-08-10 | End: 2021-08-10

## 2021-08-10 RX ORDER — ZINC GLUCONATE 50 MG
50 TABLET ORAL DAILY
COMMUNITY

## 2021-08-10 RX ORDER — DIPHENHYDRAMINE HCL 50 MG
50 CAPSULE ORAL ONCE
Status: CANCELLED | OUTPATIENT
Start: 2021-08-10 | End: 2021-08-10

## 2021-08-10 RX ORDER — AA/PROT/LYSINE/METHIO/VIT C/B6 50-12.5 MG
10 TABLET ORAL DAILY
COMMUNITY
End: 2022-07-26

## 2021-08-10 RX ORDER — CLOPIDOGREL BISULFATE 75 MG/1
75 TABLET ORAL DAILY
Qty: 30 TABLET | Refills: 11 | Status: SHIPPED | OUTPATIENT
Start: 2021-08-10 | End: 2022-01-19 | Stop reason: SDUPTHER

## 2021-08-19 ENCOUNTER — PATIENT MESSAGE (OUTPATIENT)
Dept: CARDIOLOGY | Facility: CLINIC | Age: 61
End: 2021-08-19

## 2021-09-06 ENCOUNTER — PATIENT MESSAGE (OUTPATIENT)
Dept: MEDSURG UNIT | Facility: HOSPITAL | Age: 61
End: 2021-09-06

## 2021-09-10 ENCOUNTER — DOCUMENTATION ONLY (OUTPATIENT)
Dept: CARDIOLOGY | Facility: CLINIC | Age: 61
End: 2021-09-10

## 2021-09-10 DIAGNOSIS — I23.6 THROMBOSIS OF ATRIUM, AURICULAR APPENDAGE, AND VENTRICLE AS CURRENT COMPLICATIONS FOLLOWING ACUTE MYOCARDIAL INFARCTION (CODE): ICD-10-CM

## 2021-09-10 DIAGNOSIS — Q21.12 PFO (PATENT FORAMEN OVALE): Primary | ICD-10-CM

## 2021-09-21 ENCOUNTER — PATIENT OUTREACH (OUTPATIENT)
Dept: ADMINISTRATIVE | Facility: HOSPITAL | Age: 61
End: 2021-09-21

## 2021-09-23 ENCOUNTER — PATIENT OUTREACH (OUTPATIENT)
Dept: ADMINISTRATIVE | Facility: HOSPITAL | Age: 61
End: 2021-09-23

## 2021-09-24 ENCOUNTER — OFFICE VISIT (OUTPATIENT)
Dept: FAMILY MEDICINE | Facility: CLINIC | Age: 61
End: 2021-09-24
Payer: MEDICARE

## 2021-09-24 VITALS
HEART RATE: 70 BPM | HEIGHT: 67 IN | DIASTOLIC BLOOD PRESSURE: 84 MMHG | SYSTOLIC BLOOD PRESSURE: 126 MMHG | OXYGEN SATURATION: 97 % | WEIGHT: 189.81 LBS | BODY MASS INDEX: 29.79 KG/M2

## 2021-09-24 DIAGNOSIS — I63.412 EMBOLIC STROKE INVOLVING LEFT MIDDLE CEREBRAL ARTERY: Primary | ICD-10-CM

## 2021-09-24 DIAGNOSIS — Q21.12 PFO (PATENT FORAMEN OVALE): ICD-10-CM

## 2021-09-24 DIAGNOSIS — F41.9 ANXIETY: ICD-10-CM

## 2021-09-24 DIAGNOSIS — M25.551 HIP PAIN, ACUTE, RIGHT: ICD-10-CM

## 2021-09-24 DIAGNOSIS — F17.200 SMOKER: ICD-10-CM

## 2021-09-24 DIAGNOSIS — E78.2 MIXED HYPERLIPIDEMIA: ICD-10-CM

## 2021-09-24 PROCEDURE — 3044F HG A1C LEVEL LT 7.0%: CPT | Mod: CPTII,S$GLB,, | Performed by: INTERNAL MEDICINE

## 2021-09-24 PROCEDURE — 99999 PR PBB SHADOW E&M-EST. PATIENT-LVL V: ICD-10-PCS | Mod: PBBFAC,,, | Performed by: INTERNAL MEDICINE

## 2021-09-24 PROCEDURE — 3044F PR MOST RECENT HEMOGLOBIN A1C LEVEL <7.0%: ICD-10-PCS | Mod: CPTII,S$GLB,, | Performed by: INTERNAL MEDICINE

## 2021-09-24 PROCEDURE — 99999 PR PBB SHADOW E&M-EST. PATIENT-LVL V: CPT | Mod: PBBFAC,,, | Performed by: INTERNAL MEDICINE

## 2021-09-24 PROCEDURE — 3079F DIAST BP 80-89 MM HG: CPT | Mod: CPTII,S$GLB,, | Performed by: INTERNAL MEDICINE

## 2021-09-24 PROCEDURE — 3074F SYST BP LT 130 MM HG: CPT | Mod: CPTII,S$GLB,, | Performed by: INTERNAL MEDICINE

## 2021-09-24 PROCEDURE — 3008F BODY MASS INDEX DOCD: CPT | Mod: CPTII,S$GLB,, | Performed by: INTERNAL MEDICINE

## 2021-09-24 PROCEDURE — 1160F PR REVIEW ALL MEDS BY PRESCRIBER/CLIN PHARMACIST DOCUMENTED: ICD-10-PCS | Mod: CPTII,S$GLB,, | Performed by: INTERNAL MEDICINE

## 2021-09-24 PROCEDURE — 1159F MED LIST DOCD IN RCRD: CPT | Mod: CPTII,S$GLB,, | Performed by: INTERNAL MEDICINE

## 2021-09-24 PROCEDURE — 1159F PR MEDICATION LIST DOCUMENTED IN MEDICAL RECORD: ICD-10-PCS | Mod: CPTII,S$GLB,, | Performed by: INTERNAL MEDICINE

## 2021-09-24 PROCEDURE — 3074F PR MOST RECENT SYSTOLIC BLOOD PRESSURE < 130 MM HG: ICD-10-PCS | Mod: CPTII,S$GLB,, | Performed by: INTERNAL MEDICINE

## 2021-09-24 PROCEDURE — 3008F PR BODY MASS INDEX (BMI) DOCUMENTED: ICD-10-PCS | Mod: CPTII,S$GLB,, | Performed by: INTERNAL MEDICINE

## 2021-09-24 PROCEDURE — 1160F RVW MEDS BY RX/DR IN RCRD: CPT | Mod: CPTII,S$GLB,, | Performed by: INTERNAL MEDICINE

## 2021-09-24 PROCEDURE — 99214 PR OFFICE/OUTPT VISIT, EST, LEVL IV, 30-39 MIN: ICD-10-PCS | Mod: S$GLB,,, | Performed by: INTERNAL MEDICINE

## 2021-09-24 PROCEDURE — 99214 OFFICE O/P EST MOD 30 MIN: CPT | Mod: S$GLB,,, | Performed by: INTERNAL MEDICINE

## 2021-09-24 PROCEDURE — 3079F PR MOST RECENT DIASTOLIC BLOOD PRESSURE 80-89 MM HG: ICD-10-PCS | Mod: CPTII,S$GLB,, | Performed by: INTERNAL MEDICINE

## 2021-09-24 RX ORDER — BUPROPION HYDROCHLORIDE 150 MG/1
150 TABLET ORAL DAILY
Qty: 30 TABLET | Refills: 11 | Status: SHIPPED | OUTPATIENT
Start: 2021-09-24 | End: 2021-11-26 | Stop reason: SDUPTHER

## 2021-09-24 RX ORDER — ATORVASTATIN CALCIUM 40 MG/1
40 TABLET, FILM COATED ORAL DAILY
Qty: 90 TABLET | Refills: 3 | Status: SHIPPED | OUTPATIENT
Start: 2021-09-24 | End: 2022-06-18

## 2021-09-28 ENCOUNTER — PATIENT MESSAGE (OUTPATIENT)
Dept: FAMILY MEDICINE | Facility: CLINIC | Age: 61
End: 2021-09-28

## 2021-09-28 ENCOUNTER — HOSPITAL ENCOUNTER (OUTPATIENT)
Dept: RADIOLOGY | Facility: HOSPITAL | Age: 61
Discharge: HOME | End: 2021-09-28
Attending: INTERNAL MEDICINE
Payer: MEDICARE

## 2021-09-28 DIAGNOSIS — M25.551 HIP PAIN, ACUTE, RIGHT: ICD-10-CM

## 2021-09-28 PROCEDURE — 73502 X-RAY EXAM HIP UNI 2-3 VIEWS: CPT | Mod: 26,RT,, | Performed by: RADIOLOGY

## 2021-09-28 PROCEDURE — 73502 X-RAY EXAM HIP UNI 2-3 VIEWS: CPT | Mod: TC,FY,RT

## 2021-09-28 PROCEDURE — 73502 XR HIP WITH PELVIS WHEN PERFORMED, 2 OR 3  VIEWS RIGHT: ICD-10-PCS | Mod: 26,RT,, | Performed by: RADIOLOGY

## 2021-10-04 ENCOUNTER — LAB VISIT (OUTPATIENT)
Dept: LAB | Facility: HOSPITAL | Age: 61
End: 2021-10-04
Attending: INTERNAL MEDICINE
Payer: MEDICARE

## 2021-10-04 DIAGNOSIS — Q21.12 PFO (PATENT FORAMEN OVALE): ICD-10-CM

## 2021-10-04 DIAGNOSIS — I23.6 THROMBOSIS OF ATRIUM, AURICULAR APPENDAGE, AND VENTRICLE AS CURRENT COMPLICATIONS FOLLOWING ACUTE MYOCARDIAL INFARCTION (CODE): ICD-10-CM

## 2021-10-04 LAB
ABO GROUP BLD: NORMAL
ANION GAP SERPL CALC-SCNC: 9 MMOL/L (ref 8–16)
APTT BLDCRRT: 25.4 SEC (ref 21–32)
BLD GP AB SCN CELLS X3 SERPL QL: NORMAL
BUN SERPL-MCNC: 14 MG/DL (ref 6–20)
CALCIUM SERPL-MCNC: 9.3 MG/DL (ref 8.7–10.5)
CHLORIDE SERPL-SCNC: 111 MMOL/L (ref 95–110)
CO2 SERPL-SCNC: 21 MMOL/L (ref 23–29)
CREAT SERPL-MCNC: 0.9 MG/DL (ref 0.5–1.4)
ERYTHROCYTE [DISTWIDTH] IN BLOOD BY AUTOMATED COUNT: 14.1 % (ref 11.5–14.5)
EST. GFR  (AFRICAN AMERICAN): >60 ML/MIN/1.73 M^2
EST. GFR  (NON AFRICAN AMERICAN): >60 ML/MIN/1.73 M^2
GLUCOSE SERPL-MCNC: 141 MG/DL (ref 70–110)
HCT VFR BLD AUTO: 43.6 % (ref 37–48.5)
HGB BLD-MCNC: 14.5 G/DL (ref 12–16)
MCH RBC QN AUTO: 31.3 PG (ref 27–31)
MCHC RBC AUTO-ENTMCNC: 33.3 G/DL (ref 32–36)
MCV RBC AUTO: 94 FL (ref 82–98)
PLATELET # BLD AUTO: 303 K/UL (ref 150–450)
PMV BLD AUTO: 9.2 FL (ref 9.2–12.9)
POTASSIUM SERPL-SCNC: 4.3 MMOL/L (ref 3.5–5.1)
RBC # BLD AUTO: 4.64 M/UL (ref 4–5.4)
RH BLD: NORMAL
SODIUM SERPL-SCNC: 141 MMOL/L (ref 136–145)
WBC # BLD AUTO: 7.16 K/UL (ref 3.9–12.7)

## 2021-10-04 PROCEDURE — 80048 BASIC METABOLIC PNL TOTAL CA: CPT | Performed by: INTERNAL MEDICINE

## 2021-10-04 PROCEDURE — 86850 RBC ANTIBODY SCREEN: CPT | Performed by: INTERNAL MEDICINE

## 2021-10-04 PROCEDURE — 86900 BLOOD TYPING SEROLOGIC ABO: CPT | Performed by: INTERNAL MEDICINE

## 2021-10-04 PROCEDURE — 36415 COLL VENOUS BLD VENIPUNCTURE: CPT | Performed by: INTERNAL MEDICINE

## 2021-10-04 PROCEDURE — 86901 BLOOD TYPING SEROLOGIC RH(D): CPT | Performed by: INTERNAL MEDICINE

## 2021-10-04 PROCEDURE — 85027 COMPLETE CBC AUTOMATED: CPT | Performed by: INTERNAL MEDICINE

## 2021-10-04 PROCEDURE — 85730 THROMBOPLASTIN TIME PARTIAL: CPT | Performed by: INTERNAL MEDICINE

## 2021-10-10 ENCOUNTER — LAB VISIT (OUTPATIENT)
Dept: URGENT CARE | Facility: CLINIC | Age: 61
End: 2021-10-10
Payer: MEDICARE

## 2021-10-10 DIAGNOSIS — Z01.818 PRE-OP TESTING: ICD-10-CM

## 2021-10-10 PROCEDURE — U0005 INFEC AGEN DETEC AMPLI PROBE: HCPCS | Performed by: INTERNAL MEDICINE

## 2021-10-10 PROCEDURE — U0003 INFECTIOUS AGENT DETECTION BY NUCLEIC ACID (DNA OR RNA); SEVERE ACUTE RESPIRATORY SYNDROME CORONAVIRUS 2 (SARS-COV-2) (CORONAVIRUS DISEASE [COVID-19]), AMPLIFIED PROBE TECHNIQUE, MAKING USE OF HIGH THROUGHPUT TECHNOLOGIES AS DESCRIBED BY CMS-2020-01-R: HCPCS | Mod: HCNC | Performed by: INTERNAL MEDICINE

## 2021-10-11 ENCOUNTER — HOSPITAL ENCOUNTER (OUTPATIENT)
Facility: HOSPITAL | Age: 61
Discharge: HOME | End: 2021-10-11
Attending: INTERNAL MEDICINE | Admitting: INTERNAL MEDICINE
Payer: MEDICARE

## 2021-10-11 VITALS
TEMPERATURE: 98 F | SYSTOLIC BLOOD PRESSURE: 116 MMHG | DIASTOLIC BLOOD PRESSURE: 63 MMHG | RESPIRATION RATE: 16 BRPM | OXYGEN SATURATION: 98 % | WEIGHT: 189 LBS | HEIGHT: 66 IN | BODY MASS INDEX: 30.37 KG/M2 | HEART RATE: 56 BPM

## 2021-10-11 DIAGNOSIS — N18.9 CHRONIC KIDNEY DISEASE, UNSPECIFIED CKD STAGE: ICD-10-CM

## 2021-10-11 DIAGNOSIS — R79.1 ABNORMAL COAGULATION PROFILE: ICD-10-CM

## 2021-10-11 DIAGNOSIS — Q21.12 PFO (PATENT FORAMEN OVALE): Primary | ICD-10-CM

## 2021-10-11 LAB
ABO + RH BLD: NORMAL
ANION GAP SERPL CALC-SCNC: 12 MMOL/L (ref 8–16)
APTT BLDCRRT: 24.2 SEC (ref 21–32)
BLD GP AB SCN CELLS X3 SERPL QL: NORMAL
BUN SERPL-MCNC: 15 MG/DL (ref 6–20)
CALCIUM SERPL-MCNC: 9.6 MG/DL (ref 8.7–10.5)
CHLORIDE SERPL-SCNC: 109 MMOL/L (ref 95–110)
CO2 SERPL-SCNC: 19 MMOL/L (ref 23–29)
CREAT SERPL-MCNC: 0.8 MG/DL (ref 0.5–1.4)
ERYTHROCYTE [DISTWIDTH] IN BLOOD BY AUTOMATED COUNT: 13.8 % (ref 11.5–14.5)
EST. GFR  (AFRICAN AMERICAN): >60 ML/MIN/1.73 M^2
EST. GFR  (NON AFRICAN AMERICAN): >60 ML/MIN/1.73 M^2
GLUCOSE SERPL-MCNC: 92 MG/DL (ref 70–110)
HCT VFR BLD AUTO: 38.9 % (ref 37–48.5)
HGB BLD-MCNC: 13.3 G/DL (ref 12–16)
INR PPP: 0.9 (ref 0.8–1.2)
MCH RBC QN AUTO: 31.1 PG (ref 27–31)
MCHC RBC AUTO-ENTMCNC: 34.2 G/DL (ref 32–36)
MCV RBC AUTO: 91 FL (ref 82–98)
PLATELET # BLD AUTO: 284 K/UL (ref 150–450)
PMV BLD AUTO: 9.1 FL (ref 9.2–12.9)
POTASSIUM SERPL-SCNC: 4 MMOL/L (ref 3.5–5.1)
PROTHROMBIN TIME: 10.4 SEC (ref 9–12.5)
RBC # BLD AUTO: 4.28 M/UL (ref 4–5.4)
SARS-COV-2 RDRP RESP QL NAA+PROBE: NEGATIVE
SARS-COV-2 RNA RESP QL NAA+PROBE: NOT DETECTED
SARS-COV-2- CYCLE NUMBER: NORMAL
SODIUM SERPL-SCNC: 140 MMOL/L (ref 136–145)
WBC # BLD AUTO: 8.34 K/UL (ref 3.9–12.7)

## 2021-10-11 PROCEDURE — 85610 PROTHROMBIN TIME: CPT | Mod: HCNC | Performed by: INTERNAL MEDICINE

## 2021-10-11 PROCEDURE — 93662 PR INTRACARD ECHO, THER/DX INTERVENT: ICD-10-PCS | Mod: 26,HCNC,, | Performed by: INTERNAL MEDICINE

## 2021-10-11 PROCEDURE — 63600175 PHARM REV CODE 636 W HCPCS: Mod: HCNC | Performed by: INTERNAL MEDICINE

## 2021-10-11 PROCEDURE — 25000003 PHARM REV CODE 250: Mod: HCNC | Performed by: INTERNAL MEDICINE

## 2021-10-11 PROCEDURE — C1817 SEPTAL DEFECT IMP SYS: HCPCS | Mod: HCNC | Performed by: INTERNAL MEDICINE

## 2021-10-11 PROCEDURE — 93005 ELECTROCARDIOGRAM TRACING: CPT | Mod: HCNC

## 2021-10-11 PROCEDURE — 99152 PR MOD CONSCIOUS SEDATION, SAME PHYS, 5+ YRS, FIRST 15 MIN: ICD-10-PCS | Mod: HCNC,,, | Performed by: INTERNAL MEDICINE

## 2021-10-11 PROCEDURE — 93580 TRANSCATH CLOSURE OF ASD: CPT | Mod: HCNC,,, | Performed by: INTERNAL MEDICINE

## 2021-10-11 PROCEDURE — U0002 COVID-19 LAB TEST NON-CDC: HCPCS | Mod: HCNC | Performed by: INTERNAL MEDICINE

## 2021-10-11 PROCEDURE — 85027 COMPLETE CBC AUTOMATED: CPT | Mod: HCNC | Performed by: INTERNAL MEDICINE

## 2021-10-11 PROCEDURE — C1769 GUIDE WIRE: HCPCS | Mod: HCNC | Performed by: INTERNAL MEDICINE

## 2021-10-11 PROCEDURE — C1894 INTRO/SHEATH, NON-LASER: HCPCS | Mod: HCNC | Performed by: INTERNAL MEDICINE

## 2021-10-11 PROCEDURE — 93010 EKG 12-LEAD: ICD-10-PCS | Mod: HCNC,,, | Performed by: INTERNAL MEDICINE

## 2021-10-11 PROCEDURE — 93662 INTRACARDIAC ECG (ICE): CPT | Mod: HCNC | Performed by: INTERNAL MEDICINE

## 2021-10-11 PROCEDURE — 93010 ELECTROCARDIOGRAM REPORT: CPT | Mod: HCNC,,, | Performed by: INTERNAL MEDICINE

## 2021-10-11 PROCEDURE — 93580 TRANSCATH CLOSURE OF ASD: CPT | Mod: HCNC | Performed by: INTERNAL MEDICINE

## 2021-10-11 PROCEDURE — 93580 PR PERC CLOS,CONG INTERATRIAL COMMUN W/IMPL: ICD-10-PCS | Mod: HCNC,,, | Performed by: INTERNAL MEDICINE

## 2021-10-11 PROCEDURE — 99152 MOD SED SAME PHYS/QHP 5/>YRS: CPT | Mod: HCNC | Performed by: INTERNAL MEDICINE

## 2021-10-11 PROCEDURE — 86900 BLOOD TYPING SEROLOGIC ABO: CPT | Mod: HCNC | Performed by: INTERNAL MEDICINE

## 2021-10-11 PROCEDURE — 93662 INTRACARDIAC ECG (ICE): CPT | Mod: 26,HCNC,, | Performed by: INTERNAL MEDICINE

## 2021-10-11 PROCEDURE — 85730 THROMBOPLASTIN TIME PARTIAL: CPT | Mod: HCNC | Performed by: INTERNAL MEDICINE

## 2021-10-11 PROCEDURE — 27201423 OPTIME MED/SURG SUP & DEVICES STERILE SUPPLY: Mod: HCNC | Performed by: INTERNAL MEDICINE

## 2021-10-11 PROCEDURE — 80048 BASIC METABOLIC PNL TOTAL CA: CPT | Mod: HCNC | Performed by: INTERNAL MEDICINE

## 2021-10-11 PROCEDURE — C1753 CATH, INTRAVAS ULTRASOUND: HCPCS | Mod: HCNC | Performed by: INTERNAL MEDICINE

## 2021-10-11 PROCEDURE — 99152 MOD SED SAME PHYS/QHP 5/>YRS: CPT | Mod: HCNC,,, | Performed by: INTERNAL MEDICINE

## 2021-10-11 DEVICE — SHEATH TREVISIO 10FRX80MM: Type: IMPLANTABLE DEVICE | Site: LEG | Status: FUNCTIONAL

## 2021-10-11 DEVICE — IMPLANTABLE DEVICE
Type: IMPLANTABLE DEVICE | Site: HEART | Status: FUNCTIONAL
Brand: AMPLATZER™

## 2021-10-11 RX ORDER — CEFAZOLIN SODIUM 1 G/3ML
INJECTION, POWDER, FOR SOLUTION INTRAMUSCULAR; INTRAVENOUS
Status: DISCONTINUED | OUTPATIENT
Start: 2021-10-11 | End: 2021-10-11 | Stop reason: HOSPADM

## 2021-10-11 RX ORDER — SODIUM CHLORIDE 9 MG/ML
INJECTION, SOLUTION INTRAVENOUS
Status: DISCONTINUED | OUTPATIENT
Start: 2021-10-11 | End: 2021-10-11 | Stop reason: HOSPADM

## 2021-10-11 RX ORDER — PROTAMINE SULFATE 10 MG/ML
INJECTION, SOLUTION INTRAVENOUS
Status: DISCONTINUED | OUTPATIENT
Start: 2021-10-11 | End: 2021-10-11 | Stop reason: HOSPADM

## 2021-10-11 RX ORDER — MIDAZOLAM HYDROCHLORIDE 1 MG/ML
INJECTION, SOLUTION INTRAMUSCULAR; INTRAVENOUS
Status: DISCONTINUED | OUTPATIENT
Start: 2021-10-11 | End: 2021-10-11 | Stop reason: HOSPADM

## 2021-10-11 RX ORDER — ACETAMINOPHEN 325 MG/1
650 TABLET ORAL EVERY 4 HOURS PRN
Status: DISCONTINUED | OUTPATIENT
Start: 2021-10-11 | End: 2021-10-11 | Stop reason: HOSPADM

## 2021-10-11 RX ORDER — NITROGLYCERIN 5 MG/ML
INJECTION, SOLUTION INTRAVENOUS
Status: DISCONTINUED | OUTPATIENT
Start: 2021-10-11 | End: 2021-10-11 | Stop reason: HOSPADM

## 2021-10-11 RX ORDER — DIPHENHYDRAMINE HCL 50 MG
50 CAPSULE ORAL ONCE
Status: COMPLETED | OUTPATIENT
Start: 2021-10-11 | End: 2021-10-11

## 2021-10-11 RX ORDER — SODIUM CHLORIDE 9 MG/ML
INJECTION, SOLUTION INTRAVENOUS CONTINUOUS
Status: ACTIVE | OUTPATIENT
Start: 2021-10-11 | End: 2021-10-11

## 2021-10-11 RX ORDER — LIDOCAINE HYDROCHLORIDE 20 MG/ML
INJECTION, SOLUTION INFILTRATION; PERINEURAL
Status: DISCONTINUED | OUTPATIENT
Start: 2021-10-11 | End: 2021-10-11 | Stop reason: HOSPADM

## 2021-10-11 RX ORDER — ONDANSETRON 8 MG/1
8 TABLET, ORALLY DISINTEGRATING ORAL EVERY 8 HOURS PRN
Status: DISCONTINUED | OUTPATIENT
Start: 2021-10-11 | End: 2021-10-11 | Stop reason: HOSPADM

## 2021-10-11 RX ORDER — FENTANYL CITRATE 50 UG/ML
INJECTION, SOLUTION INTRAMUSCULAR; INTRAVENOUS
Status: DISCONTINUED | OUTPATIENT
Start: 2021-10-11 | End: 2021-10-11 | Stop reason: HOSPADM

## 2021-10-11 RX ORDER — HEPARIN SOD,PORCINE/0.9 % NACL 1000/500ML
INTRAVENOUS SOLUTION INTRAVENOUS
Status: DISCONTINUED | OUTPATIENT
Start: 2021-10-11 | End: 2021-10-11 | Stop reason: HOSPADM

## 2021-10-11 RX ORDER — HEPARIN SODIUM 1000 [USP'U]/ML
INJECTION, SOLUTION INTRAVENOUS; SUBCUTANEOUS
Status: DISCONTINUED | OUTPATIENT
Start: 2021-10-11 | End: 2021-10-11 | Stop reason: HOSPADM

## 2021-10-11 RX ADMIN — SODIUM CHLORIDE: 0.9 INJECTION, SOLUTION INTRAVENOUS at 07:10

## 2021-10-11 RX ADMIN — DIPHENHYDRAMINE HYDROCHLORIDE 50 MG: 50 CAPSULE ORAL at 07:10

## 2021-10-14 ENCOUNTER — CLINICAL SUPPORT (OUTPATIENT)
Dept: SMOKING CESSATION | Facility: CLINIC | Age: 61
End: 2021-10-14
Payer: COMMERCIAL

## 2021-10-14 VITALS — BODY MASS INDEX: 30.37 KG/M2 | HEIGHT: 66 IN | WEIGHT: 189 LBS

## 2021-10-14 DIAGNOSIS — F17.210 LIGHT CIGARETTE SMOKER (1-9 CIGARETTES PER DAY): Primary | ICD-10-CM

## 2021-10-14 PROCEDURE — 99404 PREV MED CNSL INDIV APPRX 60: CPT | Mod: S$GLB,,,

## 2021-10-14 PROCEDURE — 99999 PR PBB SHADOW E&M-EST. PATIENT-LVL II: CPT | Mod: PBBFAC,,,

## 2021-10-14 PROCEDURE — 99404 PR PREVENT COUNSEL,INDIV,60 MIN: ICD-10-PCS | Mod: S$GLB,,,

## 2021-10-14 PROCEDURE — 99999 PR PBB SHADOW E&M-EST. PATIENT-LVL II: ICD-10-PCS | Mod: PBBFAC,,,

## 2021-10-14 RX ORDER — IBUPROFEN 200 MG
1 TABLET ORAL DAILY
Qty: 28 PATCH | Refills: 0 | Status: SHIPPED | OUTPATIENT
Start: 2021-10-14 | End: 2021-11-17 | Stop reason: SDUPTHER

## 2021-10-14 RX ORDER — MICONAZOLE NITRATE 2 %
2 CREAM (GRAM) TOPICAL
Qty: 100 EACH | Refills: 0 | Status: SHIPPED | OUTPATIENT
Start: 2021-10-14 | End: 2021-12-07 | Stop reason: SDUPTHER

## 2021-10-20 DIAGNOSIS — Q21.12 PFO (PATENT FORAMEN OVALE): Primary | ICD-10-CM

## 2021-11-16 ENCOUNTER — LAB VISIT (OUTPATIENT)
Dept: LAB | Facility: HOSPITAL | Age: 61
End: 2021-11-16
Attending: INTERNAL MEDICINE
Payer: MEDICARE

## 2021-11-16 DIAGNOSIS — E78.2 MIXED HYPERLIPIDEMIA: ICD-10-CM

## 2021-11-16 DIAGNOSIS — F41.9 ANXIETY: ICD-10-CM

## 2021-11-16 DIAGNOSIS — I63.412 EMBOLIC STROKE INVOLVING LEFT MIDDLE CEREBRAL ARTERY: ICD-10-CM

## 2021-11-16 LAB
ALBUMIN SERPL BCP-MCNC: 3.8 G/DL (ref 3.5–5.2)
ALP SERPL-CCNC: 116 U/L (ref 55–135)
ALT SERPL W/O P-5'-P-CCNC: 16 U/L (ref 10–44)
ANION GAP SERPL CALC-SCNC: 9 MMOL/L (ref 8–16)
AST SERPL-CCNC: 22 U/L (ref 10–40)
BILIRUB SERPL-MCNC: 0.4 MG/DL (ref 0.1–1)
BUN SERPL-MCNC: 20 MG/DL (ref 6–20)
CALCIUM SERPL-MCNC: 9.7 MG/DL (ref 8.7–10.5)
CHLORIDE SERPL-SCNC: 108 MMOL/L (ref 95–110)
CHOLEST SERPL-MCNC: 182 MG/DL (ref 120–199)
CHOLEST/HDLC SERPL: 4.3 {RATIO} (ref 2–5)
CO2 SERPL-SCNC: 23 MMOL/L (ref 23–29)
CREAT SERPL-MCNC: 1.1 MG/DL (ref 0.5–1.4)
EST. GFR  (AFRICAN AMERICAN): >60 ML/MIN/1.73 M^2
EST. GFR  (NON AFRICAN AMERICAN): 55 ML/MIN/1.73 M^2
ESTIMATED AVG GLUCOSE: 117 MG/DL (ref 68–131)
GLUCOSE SERPL-MCNC: 101 MG/DL (ref 70–110)
HBA1C MFR BLD: 5.7 % (ref 4–5.6)
HDLC SERPL-MCNC: 42 MG/DL (ref 40–75)
HDLC SERPL: 23.1 % (ref 20–50)
LDLC SERPL CALC-MCNC: 113.2 MG/DL (ref 63–159)
NONHDLC SERPL-MCNC: 140 MG/DL
POTASSIUM SERPL-SCNC: 4.3 MMOL/L (ref 3.5–5.1)
PROT SERPL-MCNC: 7.4 G/DL (ref 6–8.4)
SODIUM SERPL-SCNC: 140 MMOL/L (ref 136–145)
TRIGL SERPL-MCNC: 134 MG/DL (ref 30–150)
TSH SERPL DL<=0.005 MIU/L-ACNC: 2.28 UIU/ML (ref 0.4–4)

## 2021-11-16 PROCEDURE — 83036 HEMOGLOBIN GLYCOSYLATED A1C: CPT | Mod: HCNC | Performed by: INTERNAL MEDICINE

## 2021-11-16 PROCEDURE — 80053 COMPREHEN METABOLIC PANEL: CPT | Mod: HCNC | Performed by: INTERNAL MEDICINE

## 2021-11-16 PROCEDURE — 80061 LIPID PANEL: CPT | Mod: HCNC | Performed by: INTERNAL MEDICINE

## 2021-11-16 PROCEDURE — 36415 COLL VENOUS BLD VENIPUNCTURE: CPT | Mod: HCNC | Performed by: INTERNAL MEDICINE

## 2021-11-16 PROCEDURE — 84443 ASSAY THYROID STIM HORMONE: CPT | Mod: HCNC | Performed by: INTERNAL MEDICINE

## 2021-11-17 ENCOUNTER — CLINICAL SUPPORT (OUTPATIENT)
Dept: SMOKING CESSATION | Facility: CLINIC | Age: 61
End: 2021-11-17
Payer: COMMERCIAL

## 2021-11-17 DIAGNOSIS — F17.210 LIGHT CIGARETTE SMOKER (1-9 CIGARETTES PER DAY): ICD-10-CM

## 2021-11-17 PROCEDURE — 99999 PR PBB SHADOW E&M-EST. PATIENT-LVL I: CPT | Mod: PBBFAC,,,

## 2021-11-17 PROCEDURE — 99402 PR PREVENT COUNSEL,INDIV,30 MIN: ICD-10-PCS | Mod: 25,S$GLB,,

## 2021-11-17 PROCEDURE — 99402 PREV MED CNSL INDIV APPRX 30: CPT | Mod: 25,S$GLB,,

## 2021-11-17 PROCEDURE — 99999 PR PBB SHADOW E&M-EST. PATIENT-LVL I: ICD-10-PCS | Mod: PBBFAC,,,

## 2021-11-17 RX ORDER — IBUPROFEN 200 MG
2 TABLET ORAL DAILY
Qty: 28 PATCH | Refills: 0 | Status: SHIPPED | OUTPATIENT
Start: 2021-11-17 | End: 2021-12-07 | Stop reason: SDUPTHER

## 2021-11-21 ENCOUNTER — PATIENT MESSAGE (OUTPATIENT)
Dept: FAMILY MEDICINE | Facility: CLINIC | Age: 61
End: 2021-11-21
Payer: MEDICARE

## 2021-11-22 ENCOUNTER — TELEPHONE (OUTPATIENT)
Dept: FAMILY MEDICINE | Facility: CLINIC | Age: 61
End: 2021-11-22
Payer: MEDICARE

## 2021-11-26 ENCOUNTER — OFFICE VISIT (OUTPATIENT)
Dept: FAMILY MEDICINE | Facility: CLINIC | Age: 61
End: 2021-11-26
Payer: MEDICARE

## 2021-11-26 VITALS
SYSTOLIC BLOOD PRESSURE: 94 MMHG | DIASTOLIC BLOOD PRESSURE: 62 MMHG | HEIGHT: 66 IN | WEIGHT: 195.56 LBS | BODY MASS INDEX: 31.43 KG/M2 | HEART RATE: 66 BPM | OXYGEN SATURATION: 98 %

## 2021-11-26 DIAGNOSIS — F17.200 SMOKER: ICD-10-CM

## 2021-11-26 DIAGNOSIS — Q21.12 PFO (PATENT FORAMEN OVALE): Chronic | ICD-10-CM

## 2021-11-26 DIAGNOSIS — G45.9 TIA (TRANSIENT ISCHEMIC ATTACK): ICD-10-CM

## 2021-11-26 DIAGNOSIS — F41.9 ANXIETY: ICD-10-CM

## 2021-11-26 DIAGNOSIS — E78.2 MIXED HYPERLIPIDEMIA: ICD-10-CM

## 2021-11-26 DIAGNOSIS — R73.03 PREDIABETES: ICD-10-CM

## 2021-11-26 DIAGNOSIS — I63.412 EMBOLIC STROKE INVOLVING LEFT MIDDLE CEREBRAL ARTERY: Primary | ICD-10-CM

## 2021-11-26 DIAGNOSIS — E66.09 CLASS 1 OBESITY DUE TO EXCESS CALORIES WITH SERIOUS COMORBIDITY IN ADULT, UNSPECIFIED BMI: Chronic | ICD-10-CM

## 2021-11-26 PROCEDURE — 99499 UNLISTED E&M SERVICE: CPT | Mod: S$GLB,,, | Performed by: INTERNAL MEDICINE

## 2021-11-26 PROCEDURE — 99999 PR PBB SHADOW E&M-EST. PATIENT-LVL III: CPT | Mod: PBBFAC,HCNC,, | Performed by: INTERNAL MEDICINE

## 2021-11-26 PROCEDURE — 99214 PR OFFICE/OUTPT VISIT, EST, LEVL IV, 30-39 MIN: ICD-10-PCS | Mod: HCNC,S$GLB,, | Performed by: INTERNAL MEDICINE

## 2021-11-26 PROCEDURE — 99214 OFFICE O/P EST MOD 30 MIN: CPT | Mod: HCNC,S$GLB,, | Performed by: INTERNAL MEDICINE

## 2021-11-26 PROCEDURE — 99499 RISK ADDL DX/OHS AUDIT: ICD-10-PCS | Mod: S$GLB,,, | Performed by: INTERNAL MEDICINE

## 2021-11-26 PROCEDURE — 99999 PR PBB SHADOW E&M-EST. PATIENT-LVL III: ICD-10-PCS | Mod: PBBFAC,HCNC,, | Performed by: INTERNAL MEDICINE

## 2021-11-26 RX ORDER — BUPROPION HYDROCHLORIDE 150 MG/1
300 TABLET ORAL DAILY
Qty: 60 TABLET | Refills: 11 | Status: SHIPPED | OUTPATIENT
Start: 2021-11-26 | End: 2022-09-28

## 2021-12-06 ENCOUNTER — CLINICAL SUPPORT (OUTPATIENT)
Dept: SMOKING CESSATION | Facility: CLINIC | Age: 61
End: 2021-12-06
Payer: COMMERCIAL

## 2021-12-06 DIAGNOSIS — F17.210 LIGHT CIGARETTE SMOKER (1-9 CIGARETTES PER DAY): Primary | ICD-10-CM

## 2021-12-06 PROCEDURE — 99999 PR PBB SHADOW E&M-EST. PATIENT-LVL I: ICD-10-PCS | Mod: PBBFAC,,,

## 2021-12-06 PROCEDURE — 99404 PREV MED CNSL INDIV APPRX 60: CPT | Mod: S$GLB,,,

## 2021-12-06 PROCEDURE — 99404 PR PREVENT COUNSEL,INDIV,60 MIN: ICD-10-PCS | Mod: S$GLB,,,

## 2021-12-06 PROCEDURE — 99999 PR PBB SHADOW E&M-EST. PATIENT-LVL I: CPT | Mod: PBBFAC,,,

## 2021-12-07 ENCOUNTER — CLINICAL SUPPORT (OUTPATIENT)
Dept: SMOKING CESSATION | Facility: CLINIC | Age: 61
End: 2021-12-07
Payer: COMMERCIAL

## 2021-12-07 DIAGNOSIS — F17.210 LIGHT CIGARETTE SMOKER (1-9 CIGARETTES PER DAY): ICD-10-CM

## 2021-12-07 DIAGNOSIS — F17.210 LIGHT CIGARETTE SMOKER (1-9 CIGARETTES PER DAY): Primary | ICD-10-CM

## 2021-12-07 PROCEDURE — 99401 PREV MED CNSL INDIV APPRX 15: CPT | Mod: S$GLB,,,

## 2021-12-07 PROCEDURE — 99401 PR PREVENT COUNSEL,INDIV,15 MIN: ICD-10-PCS | Mod: S$GLB,,,

## 2021-12-07 PROCEDURE — 99999 PR PBB SHADOW E&M-EST. PATIENT-LVL I: CPT | Mod: PBBFAC,,,

## 2021-12-07 PROCEDURE — 99999 PR PBB SHADOW E&M-EST. PATIENT-LVL I: ICD-10-PCS | Mod: PBBFAC,,,

## 2021-12-07 RX ORDER — MICONAZOLE NITRATE 2 %
2 CREAM (GRAM) TOPICAL
Qty: 100 EACH | Refills: 0 | Status: SHIPPED | OUTPATIENT
Start: 2021-12-07 | End: 2022-01-03 | Stop reason: SDUPTHER

## 2021-12-07 RX ORDER — IBUPROFEN 200 MG
2 TABLET ORAL DAILY
Qty: 28 PATCH | Refills: 0 | Status: SHIPPED | OUTPATIENT
Start: 2021-12-07 | End: 2022-01-03 | Stop reason: SDUPTHER

## 2022-01-03 ENCOUNTER — CLINICAL SUPPORT (OUTPATIENT)
Dept: SMOKING CESSATION | Facility: CLINIC | Age: 62
End: 2022-01-03
Payer: COMMERCIAL

## 2022-01-03 DIAGNOSIS — F17.210 CIGARETTE NICOTINE DEPENDENCE, UNCOMPLICATED: Primary | ICD-10-CM

## 2022-01-03 DIAGNOSIS — F17.210 LIGHT CIGARETTE SMOKER (1-9 CIGARETTES PER DAY): ICD-10-CM

## 2022-01-03 PROCEDURE — 99402 PREV MED CNSL INDIV APPRX 30: CPT | Mod: S$GLB,,,

## 2022-01-03 PROCEDURE — 99999 PR PBB SHADOW E&M-EST. PATIENT-LVL I: CPT | Mod: PBBFAC,,,

## 2022-01-03 PROCEDURE — 99402 PR PREVENT COUNSEL,INDIV,30 MIN: ICD-10-PCS | Mod: S$GLB,,,

## 2022-01-03 PROCEDURE — 99999 PR PBB SHADOW E&M-EST. PATIENT-LVL I: ICD-10-PCS | Mod: PBBFAC,,,

## 2022-01-03 RX ORDER — MICONAZOLE NITRATE 2 %
2 CREAM (GRAM) TOPICAL
Qty: 100 EACH | Refills: 0 | Status: SHIPPED | OUTPATIENT
Start: 2022-01-03 | End: 2022-01-12 | Stop reason: SDUPTHER

## 2022-01-03 RX ORDER — IBUPROFEN 200 MG
2 TABLET ORAL DAILY
Qty: 28 PATCH | Refills: 0 | Status: SHIPPED | OUTPATIENT
Start: 2022-01-03 | End: 2022-07-26

## 2022-01-03 NOTE — Clinical Note
Just a note to advise how the patient is progressing in the tobacco cessation program.  Spoke with patient at length by telephone for a smoking cessation follow up. Patient states she is tobacco free as of 1/1/2022. Congratulated patient for quitting and her efforts. The patient remains on the prescribed tobacco cessation medication regimen of 21 mg patches (double) and 2 mg gum (5-6 pieces per day) without any negative side effects at this time. Pt states her daughter quit too. Session focus: completion of TCRS (Tobacco Cessation Rating Scale) reviewed strategies, habitual behavior, high risks situations, understanding urges and cravings, stress and relaxation with open discussion and additional interventions, Introduced lapses, relapses, understanding them and analyzing the situation of a lapse, conflict issues that may be linked to a lapse. The patient denies any abnormal behavioral or mental changes at this time. The patient will continue with  therapy sessions and medication monitoring by CTTS.

## 2022-01-03 NOTE — PROGRESS NOTES
Individual Follow-Up Form    1/3/2022    Quit Date: 1/1/22    Clinical Status of Patient: Outpatient    Length of Service: 30 minutes    Continuing Medication: yes  Patches    Other Medications: Gum     Target Symptoms: Withdrawal and medication side effects. The following were  rated moderate (3) to severe (4) on TCRS:  · Moderate (3): Desire/crave tobacco.  · Severe (4): none    Comments: Spoke with patient at length by telephone for a smoking cessation follow up. Patient states she is tobacco free as of 1/1/2022. Congratulated patient for quitting and her efforts. The patient remains on the prescribed tobacco cessation medication regimen of 21 mg patches (double) and 2 mg gum (5-6 pieces per day) without any negative side effects at this time. Pt states her daughter quit too. Session focus: completion of TCRS (Tobacco Cessation Rating Scale) reviewed strategies, habitual behavior, high risks situations, understanding urges and cravings, stress and relaxation with open discussion and additional interventions, Introduced lapses, relapses, understanding them and analyzing the situation of a lapse, conflict issues that may be linked to a lapse. The patient denies any abnormal behavioral or mental changes at this time. The patient will continue with  therapy sessions and medication monitoring by CTTS. Prescribed medication management will be by physician.    Diagnosis: F17.210    Next Visit: 1 week

## 2022-01-11 ENCOUNTER — TELEPHONE (OUTPATIENT)
Dept: SMOKING CESSATION | Facility: CLINIC | Age: 62
End: 2022-01-11
Payer: MEDICARE

## 2022-01-11 NOTE — TELEPHONE ENCOUNTER
Attempted to call patient for their smoking cessation appointment. Lefty a message with my contact information to reschedule the appointment Jo Rosales [phone]

## 2022-01-12 ENCOUNTER — CLINICAL SUPPORT (OUTPATIENT)
Dept: SMOKING CESSATION | Facility: CLINIC | Age: 62
End: 2022-01-12
Payer: COMMERCIAL

## 2022-01-12 DIAGNOSIS — F17.210 LIGHT CIGARETTE SMOKER (1-9 CIGS/DAY): Primary | ICD-10-CM

## 2022-01-12 DIAGNOSIS — F17.210 LIGHT CIGARETTE SMOKER (1-9 CIGARETTES PER DAY): ICD-10-CM

## 2022-01-12 PROCEDURE — 99999 PR PBB SHADOW E&M-EST. PATIENT-LVL I: CPT | Mod: PBBFAC,,,

## 2022-01-12 PROCEDURE — 99402 PR PREVENT COUNSEL,INDIV,30 MIN: ICD-10-PCS | Mod: S$GLB,,,

## 2022-01-12 PROCEDURE — 99402 PREV MED CNSL INDIV APPRX 30: CPT | Mod: S$GLB,,,

## 2022-01-12 PROCEDURE — 99999 PR PBB SHADOW E&M-EST. PATIENT-LVL I: ICD-10-PCS | Mod: PBBFAC,,,

## 2022-01-12 RX ORDER — MICONAZOLE NITRATE 2 %
2 CREAM (GRAM) TOPICAL
Qty: 100 EACH | Refills: 0 | Status: SHIPPED | OUTPATIENT
Start: 2022-01-12 | End: 2022-02-09 | Stop reason: SDUPTHER

## 2022-01-12 RX ORDER — IBUPROFEN 200 MG
1 TABLET ORAL DAILY
Qty: 28 PATCH | Refills: 0 | Status: SHIPPED | OUTPATIENT
Start: 2022-01-12 | End: 2022-02-09 | Stop reason: SDUPTHER

## 2022-01-12 NOTE — PROGRESS NOTES
Quit Date:    Clinical Status of Patient: Outpatient    Length of Service: 30 minutes    Continuing Medication: Patches, Gum      Other Medications: None      Target Symptoms: Withdrawal and medication side effects. The following were rated moderate (3) to severe (4) on TCRS:  Moderate (3): Desire/Crave tobacco  Severe (4): None    Comments: Spoke with patient at length by telephone for a smoking cessation follow up. The patient remains on the prescribed tobacco cessation medication regimen of 21 mg patches and 2 mg gum (4-5)  without any negative side effects at this time. Patient started smoking due to her whole family in her household has Covid. Patient states she is smoking 2 cpd. (1 in the morning with coffee and 1 in the evening.  Encouraged pt to separate coffee and cigarette, deep breathing exercises. Pt agreed.The patient will continue with  therapy sessions and medication monitoring by CTTS. Prescribed medication management will be by physician. The patient denies any abnormal behavioral or mental changes at this time. completion of TCRS (Tobacco Cessation Rating Scale) reviewed strategies, habitual behavior, high risks situations, understanding urges and cravings, stress and relaxation with open discussion and additional interventions, Introduced lapses, relapses, understanding them and analyzing the situation of a lapse, conflict issues that may be linked to a lapse.     Diagnosis: F17.210    Next Visit: 2 weeks

## 2022-01-12 NOTE — Clinical Note
Spoke with patient at length by telephone for a smoking cessation follow up. The patient remains on the prescribed tobacco cessation medication regimen of 21 mg patches and 2 mg gum (4-5)  without any negative side effects at this time. Patient started smoking due to her whole family in her household has Covid. Patient states she is smoking 2 cpd. (1 in the morning with coffee and 1 in the evening.  Encouraged pt to separate coffee and cigarette, deep breathing exercises. Pt agreed.The patient will continue with  therapy sessions and medication monitoring by CTTS. Prescribed medication management will be by physician. completion of TCRS (Tobacco Cessation Rating Scale) reviewed strategies, habitual behavior, high risks situations, understanding urges and cravings, stress and relaxation with open discussion and additional interventions, Introduced lapses, relapses, understanding them and analyzing the situation of a lapse, conflict issues that may be linked to a lapse.

## 2022-01-13 ENCOUNTER — OFFICE VISIT (OUTPATIENT)
Dept: URGENT CARE | Facility: CLINIC | Age: 62
End: 2022-01-13
Payer: MEDICARE

## 2022-01-13 VITALS
HEIGHT: 66 IN | SYSTOLIC BLOOD PRESSURE: 95 MMHG | BODY MASS INDEX: 30.53 KG/M2 | OXYGEN SATURATION: 97 % | TEMPERATURE: 98 F | RESPIRATION RATE: 20 BRPM | DIASTOLIC BLOOD PRESSURE: 57 MMHG | WEIGHT: 190 LBS | HEART RATE: 60 BPM

## 2022-01-13 DIAGNOSIS — J06.9 UPPER RESPIRATORY TRACT INFECTION, UNSPECIFIED TYPE: Primary | ICD-10-CM

## 2022-01-13 DIAGNOSIS — Z20.822 EXPOSURE TO COVID-19 VIRUS: ICD-10-CM

## 2022-01-13 LAB
CTP QC/QA: YES
SARS-COV-2 RDRP RESP QL NAA+PROBE: NEGATIVE

## 2022-01-13 PROCEDURE — 3008F PR BODY MASS INDEX (BMI) DOCUMENTED: ICD-10-PCS | Mod: CPTII,S$GLB,, | Performed by: FAMILY MEDICINE

## 2022-01-13 PROCEDURE — 3078F DIAST BP <80 MM HG: CPT | Mod: CPTII,S$GLB,, | Performed by: FAMILY MEDICINE

## 2022-01-13 PROCEDURE — U0002 COVID-19 LAB TEST NON-CDC: HCPCS | Mod: QW,S$GLB,, | Performed by: FAMILY MEDICINE

## 2022-01-13 PROCEDURE — 3078F PR MOST RECENT DIASTOLIC BLOOD PRESSURE < 80 MM HG: ICD-10-PCS | Mod: CPTII,S$GLB,, | Performed by: FAMILY MEDICINE

## 2022-01-13 PROCEDURE — 3074F PR MOST RECENT SYSTOLIC BLOOD PRESSURE < 130 MM HG: ICD-10-PCS | Mod: CPTII,S$GLB,, | Performed by: FAMILY MEDICINE

## 2022-01-13 PROCEDURE — 99213 PR OFFICE/OUTPT VISIT, EST, LEVL III, 20-29 MIN: ICD-10-PCS | Mod: S$GLB,,, | Performed by: FAMILY MEDICINE

## 2022-01-13 PROCEDURE — 99213 OFFICE O/P EST LOW 20 MIN: CPT | Mod: S$GLB,,, | Performed by: FAMILY MEDICINE

## 2022-01-13 PROCEDURE — 1159F PR MEDICATION LIST DOCUMENTED IN MEDICAL RECORD: ICD-10-PCS | Mod: CPTII,S$GLB,, | Performed by: FAMILY MEDICINE

## 2022-01-13 PROCEDURE — 3008F BODY MASS INDEX DOCD: CPT | Mod: CPTII,S$GLB,, | Performed by: FAMILY MEDICINE

## 2022-01-13 PROCEDURE — 1160F RVW MEDS BY RX/DR IN RCRD: CPT | Mod: CPTII,S$GLB,, | Performed by: FAMILY MEDICINE

## 2022-01-13 PROCEDURE — 1159F MED LIST DOCD IN RCRD: CPT | Mod: CPTII,S$GLB,, | Performed by: FAMILY MEDICINE

## 2022-01-13 PROCEDURE — 1160F PR REVIEW ALL MEDS BY PRESCRIBER/CLIN PHARMACIST DOCUMENTED: ICD-10-PCS | Mod: CPTII,S$GLB,, | Performed by: FAMILY MEDICINE

## 2022-01-13 PROCEDURE — 3074F SYST BP LT 130 MM HG: CPT | Mod: CPTII,S$GLB,, | Performed by: FAMILY MEDICINE

## 2022-01-13 PROCEDURE — U0002: ICD-10-PCS | Mod: QW,S$GLB,, | Performed by: FAMILY MEDICINE

## 2022-01-13 NOTE — PATIENT INSTRUCTIONS
Patient Education       COVID-19 Discharge Instructions   About this topic   Coronavirus disease 2019 is also known as COVID-19. It is a viral illness that infects the lungs. It is caused by a virus called SARS-associated coronavirus (SARS-CoV-2).  The signs of COVID-19 most often start a few days after you have been infected. In some people, it takes longer to show signs. Others never show signs of the infection. You may have a cough, fever, shaking chills and it may be hard to breathe. You may be very tired, have muscle aches, a headache or sore throat. Some people have an upset stomach or loose stools. Others lose their sense of smell or taste. You may not have these signs all the time and they may come and go while you are sick.  The virus spreads easily through droplets when you talk, sneeze, or cough. You can pass the virus to others when you are talking close together, singing, hugging, sharing food, or shaking hands. Doctors believe the germs also survive on surfaces like tables, door handles, and telephones. However, this is not a common way that COVID-19 spreads. Doctors believe you can also spread the infection even if you dont have any symptoms, but they do not know how that happens. This is why getting vaccinated is one of the best ways to keep you healthy and slow the spread of the virus.  Some people have a mild case of COVID-19 and are able to stay at home and away from others until they feel better. Others may need to be in the hospital if they are very sick. Some people with COVID-19 can have some symptoms for weeks or months. People with COVID-19 must isolate themselves. You can start to be around others when your doctor says it is safe to do so.       What care is needed at home?   · Ask your doctor what you need to do when you go home. Make sure you ask questions if you do not understand what the doctor says.  · Drink lots of water, juice, or broth to replace fluids lost from a fever.  · You  may use cool mist humidifiers to help ease congestion and coughing.  · Use 2 to 3 pillows to prop yourself up when you lie down to make it easier to breathe and sleep.  · Do not smoke and do not drink beer, wine, and mixed drinks (alcohol).  · To lower the chance of passing the infection to others, get a COVID-19 vaccine after your infection has resolved.  · If you have not been fully vaccinated:  ? Wear a mask over your mouth and nose if you are around others who are not sick. Cloth masks work best if they have more than one layer of fabric.  ? Wash your hands often.  ? Stay home in a separate room, if possible, away from others. Only go out to get medical care.  ? Use a separate bathroom if possible.  ? Do not make food for others.  What follow-up care is needed?   · Your doctor may ask you to make visits to the office to check on your progress. Be sure to keep these visits. Make sure you wear a mask at these visits.  · If you can, tell the staff you have COVID-19 ahead of time so they can take extra care to stop the disease from spreading.  · It may take a few weeks before your health returns to normal.  What drugs may be needed?   The doctor may order drugs to:  · Help with breathing  · Help with fever  · Help with swelling in your airways and lungs  · Control coughing  · Ease a sore throat  · Help a runny or stuffy nose  Will physical activity be limited?   You may have to limit your physical activity. Talk to your doctor about the right amount of activity for you. If you have been very sick with COVID-19, it can take some time to get your strength back.  Will there be any other care needed?   Doctors do not know how long you can pass the virus on to others after you are sick. This is why it is important to stay in a separate room, if possible, when you are sick. For now, doctors are giving general guidelines for you to follow after you have been sick. Before you go around other people, you should:  · Be fever  free for 24 hours without taking any drugs to lower the fever  · Have no symptoms of cough or shortness of breath  · Wait at least 10 days after first having symptoms or your first positive test, and you need to be symptom free as above. Some experts suggest waiting 20 days if you have had a more severe infection.  Talk with your doctor about getting a COVID-19 vaccine.  What problems could happen?   · Fluid loss. This is dehydration.  · Short-term or long-term lung damage  · Heart problems  · Death  When do I need to call the doctor?   · You are having so much trouble breathing that you can only say one or two words at a time.  · You need to sit upright at all times to be able to breathe and/or cannot lie down.  · You are very confused or cannot stay awake.  · Your lips or skin start to turn blue or grey.  · You think you might be having a medical emergency. Some examples of medical emergencies are:  ? Severe chest pain.  ? Not able to speak or move normally.  · You have trouble breathing when talking or sitting still.  · You have new shortness of breath.  · You become weak or dizzy.  · You have very dark urine or do not pass urine for more than 8 hours.  · You have new or worsening COVID-19 symptoms like:  ? Fever  ? Cough  ? Feeling very tired  ? Shaking chills  ? Headache  ? Trouble swallowing  ? Throwing up  ? Loose stools  ? Reddish purple spots on your fingers or toes  Teach Back: Helping You Understand   The Teach Back Method helps you understand the information we are giving you. After you talk with the staff, tell them in your own words what you learned. This helps to make sure the staff has described each thing clearly. It also helps to explain things that may have been confusing. Before going home, make sure you can do these:  · I can tell you about my condition.  · I can tell you what may help ease my breathing.  · I can tell you what I can do to help avoid passing the infection to others.  · I can tell  you what I will do if I have trouble breathing; feel sleepy or confused; or my fingertips, fingernails, skin, or lips are blue.  Where can I learn more?   Centers for Disease Control and Prevention  https://www.cdc.gov/coronavirus/2019-ncov/about/index.html   Centers for Disease Control and Prevention  https://www.cdc.gov/coronavirus/2019-ncov/hcp/disposition-in-home-patients.html   World Health Organization  https://www.who.int/news-room/q-a-detail/w-t-ekaeozxatllrz   Last Reviewed Date   2021-10-05  Consumer Information Use and Disclaimer   This information is not specific medical advice and does not replace information you receive from your health care provider. This is only a brief summary of general information. It does NOT include all information about conditions, illnesses, injuries, tests, procedures, treatments, therapies, discharge instructions or life-style choices that may apply to you. You must talk with your health care provider for complete information about your health and treatment options. This information should not be used to decide whether or not to accept your health care providers advice, instructions or recommendations. Only your health care provider has the knowledge and training to provide advice that is right for you.  Copyright   Copyright © 2021 [employer] and its affiliates and/or licensors. All rights reserved.

## 2022-01-13 NOTE — PROGRESS NOTES
"Subjective:       Patient ID: Maria M Mei is a 61 y.o. female.    Vitals:  height is 5' 6" (1.676 m) and weight is 86.2 kg (190 lb). Her oral temperature is 98.1 °F (36.7 °C). Her blood pressure is 95/57 (abnormal) and her pulse is 60. Her respiration is 20 and oxygen saturation is 97%.     Chief Complaint: Cough (Headache, congestion)    Pt stated that her symptoms began three days ago and she used otc meds and she would liked to be tested for covid in today visited.    Cough  This is a new problem. The current episode started in the past 7 days. The problem has been gradually worsening. The problem occurs every few minutes. The cough is non-productive. Associated symptoms include headaches, nasal congestion and postnasal drip. Nothing aggravates the symptoms. She has tried OTC cough suppressant (Tylenol) for the symptoms. The treatment provided no relief.       HENT: Positive for postnasal drip.    Respiratory: Positive for cough.    Neurological: Positive for headaches.       Objective:      Physical Exam   Constitutional: normal  HENT:   Head: Normocephalic and atraumatic.   Nose: Congestion present.   Mouth/Throat: Mucous membranes are moist.   Cardiovascular: Normal rate, regular rhythm, normal heart sounds and normal pulses.   Pulmonary/Chest: Effort normal and breath sounds normal.   Abdominal: Normal appearance.   Neurological: She is alert.   Nursing note and vitals reviewed.        Results for orders placed or performed in visit on 01/13/22   POCT COVID-19 Rapid Screening   Result Value Ref Range    POC Rapid COVID Negative Negative     Acceptable Yes      Assessment:       1. Exposure to COVID-19 virus          Plan:         Exposure to COVID-19 virus  -     POCT COVID-19 Rapid Screening    discussed quarantine and symptom monitoring. RTC as needed     Encouraged vaccination          "

## 2022-01-18 ENCOUNTER — PATIENT OUTREACH (OUTPATIENT)
Dept: ADMINISTRATIVE | Facility: OTHER | Age: 62
End: 2022-01-18
Payer: MEDICARE

## 2022-01-18 NOTE — PROGRESS NOTES
Health Maintenance Due   Topic Date Due    COVID-19 Vaccine (1) Never done    Pneumococcal Vaccines (Age 0-64) (1 of 2 - PPSV23) Never done    Shingles Vaccine (1 of 2) Never done    TETANUS VACCINE  09/30/2015    Influenza Vaccine (1) 09/01/2021     Updates were requested from care everywhere.  Chart was reviewed for overdue Proactive [facility] (MARTINEZ) topics (CRS, Breast Cancer Screening, Eye exam)  Health Maintenance has been updated.  LINKS immunization registry triggered.  Immunizations were reconciled.

## 2022-01-19 ENCOUNTER — OFFICE VISIT (OUTPATIENT)
Dept: CARDIOLOGY | Facility: CLINIC | Age: 62
End: 2022-01-19
Payer: MEDICARE

## 2022-01-19 VITALS
OXYGEN SATURATION: 97 % | HEART RATE: 65 BPM | WEIGHT: 205.5 LBS | BODY MASS INDEX: 33.03 KG/M2 | DIASTOLIC BLOOD PRESSURE: 60 MMHG | HEIGHT: 66 IN | SYSTOLIC BLOOD PRESSURE: 110 MMHG

## 2022-01-19 DIAGNOSIS — I63.9 CEREBROVASCULAR ACCIDENT (CVA), UNSPECIFIED MECHANISM: ICD-10-CM

## 2022-01-19 DIAGNOSIS — E66.09 CLASS 1 OBESITY DUE TO EXCESS CALORIES WITH SERIOUS COMORBIDITY IN ADULT, UNSPECIFIED BMI: ICD-10-CM

## 2022-01-19 DIAGNOSIS — R73.03 PREDIABETES: ICD-10-CM

## 2022-01-19 DIAGNOSIS — E78.2 MIXED HYPERLIPIDEMIA: Primary | ICD-10-CM

## 2022-01-19 DIAGNOSIS — G45.9 TIA (TRANSIENT ISCHEMIC ATTACK): ICD-10-CM

## 2022-01-19 DIAGNOSIS — Q21.12 PFO (PATENT FORAMEN OVALE): ICD-10-CM

## 2022-01-19 DIAGNOSIS — Z87.74 HISTORY OF AMPLATZER ATRIAL SEPTAL DEFECT CLOSURE: Chronic | ICD-10-CM

## 2022-01-19 PROCEDURE — 99214 OFFICE O/P EST MOD 30 MIN: CPT | Mod: HCNC,S$GLB,, | Performed by: INTERNAL MEDICINE

## 2022-01-19 PROCEDURE — 3078F DIAST BP <80 MM HG: CPT | Mod: HCNC,CPTII,S$GLB, | Performed by: INTERNAL MEDICINE

## 2022-01-19 PROCEDURE — 3008F PR BODY MASS INDEX (BMI) DOCUMENTED: ICD-10-PCS | Mod: HCNC,CPTII,S$GLB, | Performed by: INTERNAL MEDICINE

## 2022-01-19 PROCEDURE — 99999 PR PBB SHADOW E&M-EST. PATIENT-LVL III: CPT | Mod: PBBFAC,HCNC,, | Performed by: INTERNAL MEDICINE

## 2022-01-19 PROCEDURE — 3074F PR MOST RECENT SYSTOLIC BLOOD PRESSURE < 130 MM HG: ICD-10-PCS | Mod: HCNC,CPTII,S$GLB, | Performed by: INTERNAL MEDICINE

## 2022-01-19 PROCEDURE — 3078F PR MOST RECENT DIASTOLIC BLOOD PRESSURE < 80 MM HG: ICD-10-PCS | Mod: HCNC,CPTII,S$GLB, | Performed by: INTERNAL MEDICINE

## 2022-01-19 PROCEDURE — 99214 PR OFFICE/OUTPT VISIT, EST, LEVL IV, 30-39 MIN: ICD-10-PCS | Mod: HCNC,S$GLB,, | Performed by: INTERNAL MEDICINE

## 2022-01-19 PROCEDURE — 99999 PR PBB SHADOW E&M-EST. PATIENT-LVL III: ICD-10-PCS | Mod: PBBFAC,HCNC,, | Performed by: INTERNAL MEDICINE

## 2022-01-19 PROCEDURE — 3008F BODY MASS INDEX DOCD: CPT | Mod: HCNC,CPTII,S$GLB, | Performed by: INTERNAL MEDICINE

## 2022-01-19 PROCEDURE — 3074F SYST BP LT 130 MM HG: CPT | Mod: HCNC,CPTII,S$GLB, | Performed by: INTERNAL MEDICINE

## 2022-01-19 RX ORDER — CLOPIDOGREL BISULFATE 75 MG/1
75 TABLET ORAL DAILY
Qty: 30 TABLET | Refills: 11 | Status: SHIPPED | OUTPATIENT
Start: 2022-01-19 | End: 2022-08-02

## 2022-01-19 NOTE — PROGRESS NOTES
Subjective:   @Patient ID:  Maria M Mei is a 61 y.o. female who presents for evaluation of CVA        HPI:   Here for follow up   She is doing very well   No chest pain, no palpitations  S/p PFO closure for cryptogenic CVA   No recurrent CVA or TIA    Historically:    She was admitted 5/2021 with MCA embolic CVA s/p TPA. No residual deficits   No hx of a.fib    Almost stopped tobacco            Prior cardiovascular  Hx  --------------------------------    S/p PFO closure with Amplatz 35 mm  10/11/2021      - Coronary CTA 7/23/2021  1.  CAD-RADS 0.     2.  Normal coronary arteries.  There is however considerable slice misalignment.     3.  Mild atherosclerotic plaque in the aortic arch.     4.  Minimally enlarged coronary sinus.  Normal pulmonary venous return      - Stress Echo 7/23/2021  · Stress echo is concerning for ischemia. Images obtained with some augmentation of LV function at HR much lower than target HR. Some inages concerning for LV dilatation.  · The ECG portion of this study is abnormal but not diagnostic for ischemia.  · The test was stopped because the patient experienced fatigue.  · There were no arrhythmias during stress.  · Low normal systolic function.  · The estimated ejection fraction is 50%.        -  Echo 7/6/2021  · The left ventricle is normal in size with normal systolic function.  · The estimated ejection fraction is 55%.  · Normal right ventricular size with normal right ventricular systolic function.  · Study is positive for intercardiac shunt that is right to left.  · Normal central venous pressure (3 mmHg).            - ECHO 5/24/2021  No mention about bubble study   · The left ventricle is normal in size with normal systolic function. The estimated ejection fraction is 55-60%.  · Normal right ventricular size with normal right ventricular systolic function.  · Normal left ventricular diastolic function.  · Mild left atrial enlargement.  · Low/normal central venous pressure (3  mmHg).      - EM 5/31/2021  · Baseline rhythm was sinus bradycardia with first degree AV block and an initial heart rate of 56 bpm.  · There were no reported symptoms.  · There were no atrial or ventricular arrhythmias.          Patient Active Problem List    Diagnosis Date Noted    History of Amplatzer atrial septal defect closure 01/19/2022    Prediabetes 11/26/2021    Medication overuse headache 07/22/2021    PFO (patent foramen ovale) 07/15/2021     Closure with Dr. Juarez 10/11/21 with 35 Amplatz device      Class 1 obesity due to excess calories in adult 07/15/2021    Embolic stroke involving left middle cerebral artery 05/23/2021    TIA (transient ischemic attack) 05/23/2021    S/P admn tPA in diff fac w/n last 24 hr bef adm to crnt fac 05/23/2021    Smoker 05/23/2021    Cytotoxic cerebral edema 05/23/2021    Cerebrovascular accident (CVA) 05/23/2021    Mixed hyperlipidemia 05/23/2021    Shortness of breath                     LAST HbA1c  Lab Results   Component Value Date    HGBA1C 5.7 (H) 11/16/2021       Lipid panel  Lab Results   Component Value Date    CHOL 182 11/16/2021    CHOL 130 06/26/2021    CHOL 216 (H) 05/23/2021     Lab Results   Component Value Date    HDL 42 11/16/2021    HDL 30 (L) 06/26/2021    HDL 25 (L) 05/23/2021     Lab Results   Component Value Date    LDLCALC 113.2 11/16/2021    LDLCALC 86.8 06/26/2021    LDLCALC 130.2 05/23/2021     Lab Results   Component Value Date    TRIG 134 11/16/2021    TRIG 66 06/26/2021    TRIG 304 (H) 05/23/2021     Lab Results   Component Value Date    CHOLHDL 23.1 11/16/2021    CHOLHDL 23.1 06/26/2021    CHOLHDL 11.6 (L) 05/23/2021            Review of Systems   Constitutional: Negative for chills and fever.   HENT: Negative for hearing loss and nosebleeds.    Eyes: Negative for blurred vision.   Cardiovascular: Negative for chest pain, leg swelling and palpitations.   Respiratory: Negative for hemoptysis and shortness of breath.     Hematologic/Lymphatic: Negative for bleeding problem.   Skin: Negative for itching.   Musculoskeletal: Negative for falls.   Gastrointestinal: Negative for abdominal pain and hematochezia.   Genitourinary: Negative for hematuria.   Neurological: Negative for dizziness and loss of balance.   Psychiatric/Behavioral: Negative for altered mental status and depression.       Objective:   Physical Exam  Constitutional:       Appearance: She is well-developed.   HENT:      Head: Normocephalic and atraumatic.   Eyes:      Conjunctiva/sclera: Conjunctivae normal.   Neck:      Vascular: No carotid bruit or JVD.   Cardiovascular:      Rate and Rhythm: Normal rate and regular rhythm.      Pulses:           Carotid pulses are 2+ on the right side and 2+ on the left side.       Radial pulses are 2+ on the right side and 2+ on the left side.      Heart sounds: Normal heart sounds. No murmur heard.  No friction rub. No gallop.    Pulmonary:      Effort: Pulmonary effort is normal. No respiratory distress.      Breath sounds: Normal breath sounds. No stridor. No wheezing.   Musculoskeletal:      Cervical back: Neck supple.   Skin:     General: Skin is warm and dry.   Neurological:      Mental Status: She is alert and oriented to person, place, and time.   Psychiatric:         Behavior: Behavior normal.         Assessment:     1. Mixed hyperlipidemia    2. TIA (transient ischemic attack)    3. PFO (patent foramen ovale)    4. Cerebrovascular accident (CVA), unspecified mechanism    5. Class 1 obesity due to excess calories with serious comorbidity in adult, unspecified BMI    6. Prediabetes    7. History of Amplatzer atrial septal defect closure        Plan:   - S/P PFO closure for cryptogenic CVA   - ASA/Plavix  - High intense statin   - Tobacco cessation           I spent 5-10 minutes asking, assessing, assisting, arranging and advising heart healthy diet improvements. This included low-salt meals, portion control and health food  alternatives. I also encourage 30 minutes of moderate exercise 3-4x a week.       Pertinent cardiac images and EKG reviewed independently.    Continue with current medical plan and lifestyle changes.  Return sooner for concerns or questions. If symptoms persist go to the ED  I have reviewed all pertinent data including patient's medical history in detail and updated the computerized patient record.     No orders of the defined types were placed in this encounter.      Follow up as scheduled.     She expressed verbal understanding and agreed with the plan    Patient's Medications   New Prescriptions    No medications on file   Previous Medications    ASCORBIC ACID, VITAMIN C, (VITAMIN C) 250 MG TABLET    Take 250 mg by mouth once daily.    ASPIRIN 81 MG CHEW    Chew and swallow 1 tablet (81 mg total) by mouth once daily.    ATORVASTATIN (LIPITOR) 40 MG TABLET    Take 1 tablet (40 mg total) by mouth once daily.    BUPROPION (WELLBUTRIN XL) 150 MG TB24 TABLET    Take 2 tablets (300 mg total) by mouth once daily.    CALCIUM CARBONATE 1250 MG CAPSULE    Take 1,250 mg by mouth 2 (two) times daily with meals.    COENZYME Q10 10 MG CAPSULE    Take 10 mg by mouth once daily.    ERGOCALCIFEROL (ERGOCALCIFEROL) 50,000 UNIT CAP    Take 1,000 Units by mouth once daily.    FLAXSEED ORAL    Take 1 tablet by mouth once daily.    NICOTINE (NICODERM CQ) 21 MG/24 HR    Place 2 patches onto the skin once daily. Dose approptiate    NICOTINE (NICODERM CQ) 21 MG/24 HR    Place 1 patch onto the skin once daily.    NICOTINE, POLACRILEX, (NICORETTE) 2 MG GUM    Take 1 each (2 mg total) by mouth as needed (as needed).    VARENICLINE (CHANTIX DON) 0.5 MG (11)- 1 MG (42) TABLET    Take 1 tablet by mouth 2 (two) times daily. Take one 0.5mg tab by mouth once daily X3 days,then increase to one 0.5mg tab twice daily X4 days,then increase to one 1mg tab twice daily    VENTOLIN HFA 90 MCG/ACTUATION INHALER    Inhale 2 puffs into the lungs every 6  (six) hours as needed for Wheezing.    ZINC GLUCONATE 50 MG TABLET    Take 50 mg by mouth once daily.   Modified Medications    Modified Medication Previous Medication    CLOPIDOGREL (PLAVIX) 75 MG TABLET clopidogreL (PLAVIX) 75 mg tablet       Take 1 tablet (75 mg total) by mouth once daily.    Take 1 tablet (75 mg total) by mouth once daily.   Discontinued Medications    No medications on file

## 2022-01-27 ENCOUNTER — CLINICAL SUPPORT (OUTPATIENT)
Dept: SMOKING CESSATION | Facility: CLINIC | Age: 62
End: 2022-01-27
Payer: COMMERCIAL

## 2022-01-27 DIAGNOSIS — F17.210 CIGARETTE NICOTINE DEPENDENCE, UNCOMPLICATED: Primary | ICD-10-CM

## 2022-01-27 PROCEDURE — 99999 PR PBB SHADOW E&M-EST. PATIENT-LVL III: ICD-10-PCS | Mod: PBBFAC,,,

## 2022-01-27 PROCEDURE — 99404 PREV MED CNSL INDIV APPRX 60: CPT | Mod: S$GLB,,,

## 2022-01-27 PROCEDURE — 99404 PR PREVENT COUNSEL,INDIV,60 MIN: ICD-10-PCS | Mod: S$GLB,,,

## 2022-01-27 PROCEDURE — 99999 PR PBB SHADOW E&M-EST. PATIENT-LVL III: CPT | Mod: PBBFAC,,,

## 2022-01-27 NOTE — Clinical Note
Spoke with patient at length by telephone for a smoking cessation follow up. Pt quit smoking 1/17/202 her daughter quit too. Commended her for her achievement and success. Patient walking and staying active with her [employer]. Encourage pt deep breathing exercises and positive thinking. The patient remains on the prescribed tobacco cessation medication regimen of 21 mg patches and 2 mg gum without any negative side effects at this time. The patient will continue with  therapy sessions and medication monitoring by CTTS. Prescribed medication management will be by physician. completion of TCRS (Tobacco Cessation Rating Scale) reviewed strategies, habitual behavior, high risks situations, understanding urges and cravings, stress and relaxation with open discussion and additional interventions, Introduced lapses, relapses, understanding them and analyzing the situation of a lapse, conflict issues that may be linked to a lapse.

## 2022-01-27 NOTE — PROGRESS NOTES
Quit Date: 1/17/2022    Clinical Status of Patient: Outpatient      Length of Service: 30 minutes      Continuing Medication: yes patches, gum      Other Medications: none        Target Symptoms: Withdrawal and medication side effects. The following were rated moderate (3) to severe (4) on TCRS:  Moderate (3): desire/crave tobacco  Severe (4): none        Comments: Spoke with patient at length by telephone for a smoking cessation follow up. Pt quit smoking 1/17/202 her daughter quit too. Commended her for her achievement and success. Patient walking and staying active with her Spiritism. Encourage pt deep breathing exercises and positive thinking. The patient remains on the prescribed tobacco cessation medication regimen of 21 mg patches and 2 mg gum without any negative side effects at this time. The patient will continue with  therapy sessions and medication monitoring by CTTS. Prescribed medication management will be by physician. completion of TCRS (Tobacco Cessation Rating Scale) reviewed strategies, habitual behavior, high risks situations, understanding urges and cravings, stress and relaxation with open discussion and additional interventions, Introduced lapses, relapses, understanding them and analyzing the situation of a lapse, conflict issues that may be linked to a lapse.       Diagnosis: F17.210    Next Visit: 2 weeks

## 2022-02-09 ENCOUNTER — CLINICAL SUPPORT (OUTPATIENT)
Dept: SMOKING CESSATION | Facility: CLINIC | Age: 62
End: 2022-02-09
Payer: COMMERCIAL

## 2022-02-09 DIAGNOSIS — F17.210 LIGHT CIGARETTE SMOKER (1-9 CIGS/DAY): ICD-10-CM

## 2022-02-09 DIAGNOSIS — F17.210 CIGARETTE NICOTINE DEPENDENCE, UNCOMPLICATED: Primary | ICD-10-CM

## 2022-02-09 DIAGNOSIS — F17.210 LIGHT CIGARETTE SMOKER (1-9 CIGARETTES PER DAY): ICD-10-CM

## 2022-02-09 PROCEDURE — 99999 PR PBB SHADOW E&M-EST. PATIENT-LVL III: CPT | Mod: PBBFAC,,,

## 2022-02-09 PROCEDURE — 99402 PR PREVENT COUNSEL,INDIV,30 MIN: ICD-10-PCS | Mod: S$GLB,,,

## 2022-02-09 PROCEDURE — 99999 PR PBB SHADOW E&M-EST. PATIENT-LVL III: ICD-10-PCS | Mod: PBBFAC,,,

## 2022-02-09 PROCEDURE — 99402 PREV MED CNSL INDIV APPRX 30: CPT | Mod: S$GLB,,,

## 2022-02-09 RX ORDER — MICONAZOLE NITRATE 2 %
2 CREAM (GRAM) TOPICAL
Qty: 100 EACH | Refills: 0 | Status: SHIPPED | OUTPATIENT
Start: 2022-02-09

## 2022-02-09 RX ORDER — IBUPROFEN 200 MG
1 TABLET ORAL DAILY
Qty: 28 PATCH | Refills: 0 | Status: SHIPPED | OUTPATIENT
Start: 2022-02-09

## 2022-02-09 NOTE — PROGRESS NOTES
Individual Follow-Up Form    2/9/2022    Quit Date: 1/17/2022    Clinical Status of Patient: Outpatient    Length of Service: 30 minutes    Continuing Medication:  Yes, patches gum  Other Medications:     Target Symptoms: Withdrawal and medication side effects. The following were  rated moderate (3) to severe (4) on TCRS:  · Moderate (3): desire/crave tobacco  · Severe (4): none    Comments : Spoke with patient at length by telephone for a smoking cessation follow up. Patient is smoke free as 1/17/2022.The patient remains on the prescribed tobacco cessation medication regimen  21 mg patches and 2 mg gum without any negative side effects at this time. completion of TCRS (Tobacco Cessation Rating Scale) reviewed strategies, habitual behavior, high risks situations, understanding urges and cravings, stress and relaxation with open discussion and additional interventions, Introduced lapses, relapses, understanding them and analyzing the situation of a lapse, conflict issues that may be linked to a lapse.       Diagnosis: F17.210    Next Visit:

## 2022-02-28 ENCOUNTER — LAB VISIT (OUTPATIENT)
Dept: LAB | Facility: HOSPITAL | Age: 62
End: 2022-02-28
Attending: INTERNAL MEDICINE
Payer: MEDICARE

## 2022-02-28 DIAGNOSIS — E78.2 MIXED HYPERLIPIDEMIA: ICD-10-CM

## 2022-02-28 DIAGNOSIS — R73.03 PREDIABETES: ICD-10-CM

## 2022-02-28 LAB
ALBUMIN SERPL BCP-MCNC: 3.5 G/DL (ref 3.5–5.2)
ALP SERPL-CCNC: 89 U/L (ref 55–135)
ALT SERPL W/O P-5'-P-CCNC: 19 U/L (ref 10–44)
ANION GAP SERPL CALC-SCNC: 5 MMOL/L (ref 8–16)
AST SERPL-CCNC: 26 U/L (ref 10–40)
BILIRUB SERPL-MCNC: 0.4 MG/DL (ref 0.1–1)
BUN SERPL-MCNC: 16 MG/DL (ref 6–20)
CALCIUM SERPL-MCNC: 9.3 MG/DL (ref 8.7–10.5)
CHLORIDE SERPL-SCNC: 111 MMOL/L (ref 95–110)
CHOLEST SERPL-MCNC: 134 MG/DL (ref 120–199)
CHOLEST/HDLC SERPL: 3.3 {RATIO} (ref 2–5)
CO2 SERPL-SCNC: 26 MMOL/L (ref 23–29)
CREAT SERPL-MCNC: 1.1 MG/DL (ref 0.5–1.4)
EST. GFR  (AFRICAN AMERICAN): >60 ML/MIN/1.73 M^2
EST. GFR  (NON AFRICAN AMERICAN): 55 ML/MIN/1.73 M^2
ESTIMATED AVG GLUCOSE: 114 MG/DL (ref 68–131)
GLUCOSE SERPL-MCNC: 103 MG/DL (ref 70–110)
HBA1C MFR BLD: 5.6 % (ref 4–5.6)
HDLC SERPL-MCNC: 41 MG/DL (ref 40–75)
HDLC SERPL: 30.6 % (ref 20–50)
LDLC SERPL CALC-MCNC: 79 MG/DL (ref 63–159)
NONHDLC SERPL-MCNC: 93 MG/DL
POTASSIUM SERPL-SCNC: 4.4 MMOL/L (ref 3.5–5.1)
PROT SERPL-MCNC: 6.6 G/DL (ref 6–8.4)
SODIUM SERPL-SCNC: 142 MMOL/L (ref 136–145)
TRIGL SERPL-MCNC: 70 MG/DL (ref 30–150)

## 2022-02-28 PROCEDURE — 80053 COMPREHEN METABOLIC PANEL: CPT | Performed by: INTERNAL MEDICINE

## 2022-02-28 PROCEDURE — 36415 COLL VENOUS BLD VENIPUNCTURE: CPT | Performed by: INTERNAL MEDICINE

## 2022-02-28 PROCEDURE — 80061 LIPID PANEL: CPT | Performed by: INTERNAL MEDICINE

## 2022-02-28 PROCEDURE — 83036 HEMOGLOBIN GLYCOSYLATED A1C: CPT | Performed by: INTERNAL MEDICINE

## 2022-03-02 ENCOUNTER — OFFICE VISIT (OUTPATIENT)
Dept: FAMILY MEDICINE | Facility: CLINIC | Age: 62
End: 2022-03-02
Payer: MEDICARE

## 2022-03-02 VITALS
DIASTOLIC BLOOD PRESSURE: 68 MMHG | BODY MASS INDEX: 33.24 KG/M2 | HEART RATE: 53 BPM | OXYGEN SATURATION: 98 % | WEIGHT: 206.81 LBS | HEIGHT: 66 IN | SYSTOLIC BLOOD PRESSURE: 110 MMHG

## 2022-03-02 DIAGNOSIS — E66.09 CLASS 1 OBESITY DUE TO EXCESS CALORIES WITH SERIOUS COMORBIDITY IN ADULT, UNSPECIFIED BMI: ICD-10-CM

## 2022-03-02 DIAGNOSIS — I63.412 EMBOLIC STROKE INVOLVING LEFT MIDDLE CEREBRAL ARTERY: ICD-10-CM

## 2022-03-02 DIAGNOSIS — E78.2 MIXED HYPERLIPIDEMIA: ICD-10-CM

## 2022-03-02 DIAGNOSIS — M15.9 OSTEOARTHRITIS OF MULTIPLE JOINTS, UNSPECIFIED OSTEOARTHRITIS TYPE: ICD-10-CM

## 2022-03-02 DIAGNOSIS — I63.00 CEREBROVASCULAR ACCIDENT (CVA) DUE TO THROMBOSIS OF PRECEREBRAL ARTERY: Primary | ICD-10-CM

## 2022-03-02 DIAGNOSIS — R73.03 PREDIABETES: ICD-10-CM

## 2022-03-02 PROCEDURE — 1160F PR REVIEW ALL MEDS BY PRESCRIBER/CLIN PHARMACIST DOCUMENTED: ICD-10-PCS | Mod: CPTII,S$GLB,, | Performed by: INTERNAL MEDICINE

## 2022-03-02 PROCEDURE — 99214 OFFICE O/P EST MOD 30 MIN: CPT | Mod: S$GLB,,, | Performed by: INTERNAL MEDICINE

## 2022-03-02 PROCEDURE — 3044F PR MOST RECENT HEMOGLOBIN A1C LEVEL <7.0%: ICD-10-PCS | Mod: CPTII,S$GLB,, | Performed by: INTERNAL MEDICINE

## 2022-03-02 PROCEDURE — 1159F MED LIST DOCD IN RCRD: CPT | Mod: CPTII,S$GLB,, | Performed by: INTERNAL MEDICINE

## 2022-03-02 PROCEDURE — 3044F HG A1C LEVEL LT 7.0%: CPT | Mod: CPTII,S$GLB,, | Performed by: INTERNAL MEDICINE

## 2022-03-02 PROCEDURE — 1160F RVW MEDS BY RX/DR IN RCRD: CPT | Mod: CPTII,S$GLB,, | Performed by: INTERNAL MEDICINE

## 2022-03-02 PROCEDURE — 3078F DIAST BP <80 MM HG: CPT | Mod: CPTII,S$GLB,, | Performed by: INTERNAL MEDICINE

## 2022-03-02 PROCEDURE — 99999 PR PBB SHADOW E&M-EST. PATIENT-LVL IV: CPT | Mod: PBBFAC,,, | Performed by: INTERNAL MEDICINE

## 2022-03-02 PROCEDURE — 3008F PR BODY MASS INDEX (BMI) DOCUMENTED: ICD-10-PCS | Mod: CPTII,S$GLB,, | Performed by: INTERNAL MEDICINE

## 2022-03-02 PROCEDURE — 99999 PR PBB SHADOW E&M-EST. PATIENT-LVL IV: ICD-10-PCS | Mod: PBBFAC,,, | Performed by: INTERNAL MEDICINE

## 2022-03-02 PROCEDURE — 99214 PR OFFICE/OUTPT VISIT, EST, LEVL IV, 30-39 MIN: ICD-10-PCS | Mod: S$GLB,,, | Performed by: INTERNAL MEDICINE

## 2022-03-02 PROCEDURE — 1159F PR MEDICATION LIST DOCUMENTED IN MEDICAL RECORD: ICD-10-PCS | Mod: CPTII,S$GLB,, | Performed by: INTERNAL MEDICINE

## 2022-03-02 PROCEDURE — 3074F PR MOST RECENT SYSTOLIC BLOOD PRESSURE < 130 MM HG: ICD-10-PCS | Mod: CPTII,S$GLB,, | Performed by: INTERNAL MEDICINE

## 2022-03-02 PROCEDURE — 3074F SYST BP LT 130 MM HG: CPT | Mod: CPTII,S$GLB,, | Performed by: INTERNAL MEDICINE

## 2022-03-02 PROCEDURE — 3008F BODY MASS INDEX DOCD: CPT | Mod: CPTII,S$GLB,, | Performed by: INTERNAL MEDICINE

## 2022-03-02 PROCEDURE — 3078F PR MOST RECENT DIASTOLIC BLOOD PRESSURE < 80 MM HG: ICD-10-PCS | Mod: CPTII,S$GLB,, | Performed by: INTERNAL MEDICINE

## 2022-03-02 RX ORDER — NABUMETONE 500 MG/1
500 TABLET, FILM COATED ORAL DAILY PRN
Qty: 30 TABLET | Refills: 0 | Status: SHIPPED | OUTPATIENT
Start: 2022-03-02 | End: 2022-05-17 | Stop reason: SINTOL

## 2022-03-02 RX ORDER — CYCLOBENZAPRINE HCL 5 MG
5 TABLET ORAL 2 TIMES DAILY PRN
Qty: 30 TABLET | Refills: 2 | Status: SHIPPED | OUTPATIENT
Start: 2022-03-02 | End: 2022-04-11

## 2022-03-02 NOTE — PROGRESS NOTES
Subjective:       Patient ID: Maria M Mei is a 61 y.o. female.    Chief Complaint: Follow-up (3 month )      HPI  Maria M Mei is a 61 y.o. female with chronic conditions of smoking, acute MI, and recent an embolic stroke, and found with PFO later having closure who presents today for routine health maintenance.    Reports she feels well.  Quit smoking 3 weeks ago.  Stays active and things her weight has not dropped due to smoking cessation.  She walks of [address] [place] [address] every day walking her dog.  Watches her diet and tries to make healthy options.    Has been having knee pains and Voltaren usually helps but trying not to take the Voltaren with the Plavix.  When her pain has been significant has taking Voltaren but hold the Plavix.  She is hoping to be able to get off the Plavix.  Pain is usually when she walks.  Tried meloxicam in the past but gave her blurry vision.  Also reports chronic neck and back pains, mostly at night.  No weakness.    Feels Wellbutrin has helped ir, especially to quit smoking.  Tends to get anxious but coping with things.  Appears motivated with her health decisions.    Had labs done showing improved A1c with stable fasting blood glucose.  Lipid profile looks within normal limits.    Health Maintenance:  Health Maintenance   Topic Date Due    TETANUS VACCINE  09/30/2015    Mammogram  05/25/2022    High Dose Statin  01/13/2023    Lipid Panel  02/28/2027    Hepatitis C Screening  Completed       Review of Systems   Constitutional: Negative for chills and fever.   HENT: Negative for sore throat.    Eyes: Negative for visual disturbance.   Respiratory: Negative for chest tightness and shortness of breath.    Cardiovascular: Negative for chest pain and palpitations.   Gastrointestinal: Negative.    Genitourinary: Negative.    Musculoskeletal: Positive for arthralgias, back pain and neck pain.   Neurological: Negative for headaches.   Psychiatric/Behavioral: Positive for sleep  disturbance.   All other systems reviewed and are negative.     Past Medical History:   Diagnosis Date    COVID-19 2020    Hyperlipidemia     MI (myocardial infarction)     Stroke        Past Surgical History:   Procedure Laterality Date    ANKLE SURGERY      CHOLECYSTECTOMY         Family History   Problem Relation Age of Onset    Stroke Father     Heart disease Father     Diabetes Mother     Hypertension Mother     Lung cancer Mother     Arthritis Sister     Diabetes Mellitus Brother     Heart disease Brother     No Known Problems Maternal Grandmother     No Known Problems Maternal Grandfather     No Known Problems Paternal Grandmother     No Known Problems Paternal Grandfather     Bipolar disorder Daughter        Social History     Socioeconomic History    Marital status: Single   Tobacco Use    Smoking status: Former Smoker     Packs/day: 0.50     Types: Cigarettes     Quit date: 2022     Years since quittin.1    Smokeless tobacco: Never Used   Substance and Sexual Activity    Alcohol use: Not Currently     Alcohol/week: 1.0 standard drink     Types: 1 Standard drinks or equivalent per week     Comment: Socially    Drug use: No    Sexual activity: Not Currently       Current Outpatient Medications   Medication Sig Dispense Refill    ascorbic acid, vitamin C, (VITAMIN C) 250 MG tablet Take 250 mg by mouth once daily.      aspirin 81 MG Chew Chew and swallow 1 tablet (81 mg total) by mouth once daily. 30 tablet 0    atorvastatin (LIPITOR) 40 MG tablet Take 1 tablet (40 mg total) by mouth once daily. 90 tablet 3    buPROPion (WELLBUTRIN XL) 150 MG TB24 tablet Take 2 tablets (300 mg total) by mouth once daily. 60 tablet 11    calcium carbonate 1250 MG capsule Take 1,250 mg by mouth 2 (two) times daily with meals.      clopidogreL (PLAVIX) 75 mg tablet Take 1 tablet (75 mg total) by mouth once daily. 30 tablet 11    coenzyme Q10 10 mg capsule Take 10 mg by mouth once daily.       ergocalciferol (ERGOCALCIFEROL) 50,000 unit Cap Take 1,000 Units by mouth once daily.      FLAXSEED ORAL Take 1 tablet by mouth once daily.      nicotine (NICODERM CQ) 21 mg/24 hr Place 2 patches onto the skin once daily. Dose approptiate 28 patch 0    nicotine (NICODERM CQ) 21 mg/24 hr Place 1 patch onto the skin once daily. 28 patch 0    nicotine, polacrilex, (NICORETTE) 2 mg Gum Take 1 each (2 mg total) by mouth as needed (as needed). 100 each 0    VENTOLIN HFA 90 mcg/actuation inhaler Inhale 2 puffs into the lungs every 6 (six) hours as needed for Wheezing. 18 g 5    zinc gluconate 50 mg tablet Take 50 mg by mouth once daily.      cyclobenzaprine (FLEXERIL) 5 MG tablet Take 1 tablet (5 mg total) by mouth 2 (two) times daily as needed for Muscle spasms. 30 tablet 2    nabumetone (RELAFEN) 500 MG tablet Take 1 tablet (500 mg total) by mouth daily as needed for Pain. 30 tablet 0     No current facility-administered medications for this visit.       Review of patient's allergies indicates:   Allergen Reactions    Codeine Itching         Objective:       Last 3 sets of Vitals    Vitals - 1 value per visit 1/19/2022 3/2/2022 3/2/2022   SYSTOLIC 110 - 110   DIASTOLIC 60 - 68   Pulse 65 - 53   Temp - - -   Resp - - -   SPO2 97 - 98   Weight (lb) 205.47 - 206.79   Weight (kg) 93.2 - 93.8   Height 66 - 66   BMI (Calculated) 33.2 - 33.4   VISIT REPORT - - -   Pain Score  - 0 -   Physical Exam  Constitutional:       General: She is not in acute distress.     Appearance: Normal appearance.   HENT:      Head: Normocephalic.      Right Ear: Tympanic membrane normal.      Left Ear: Tympanic membrane normal.      Nose: Nose normal.      Mouth/Throat:      Mouth: Mucous membranes are moist.      Pharynx: Oropharynx is clear. No posterior oropharyngeal erythema.   Eyes:      General: No scleral icterus.     Extraocular Movements: Extraocular movements intact.      Conjunctiva/sclera: Conjunctivae normal.      Pupils:  Pupils are equal, round, and reactive to light.   Neck:      Vascular: No carotid bruit.      Comments: No goiter.  Cardiovascular:      Rate and Rhythm: Normal rate and regular rhythm.      Pulses: Normal pulses.      Heart sounds: Normal heart sounds.   Pulmonary:      Effort: Pulmonary effort is normal.      Breath sounds: Normal breath sounds.   Abdominal:      General: Bowel sounds are normal. There is no distension.      Palpations: Abdomen is soft. There is no mass.      Tenderness: There is no abdominal tenderness.   Musculoskeletal:         General: No swelling. Normal range of motion.      Cervical back: Neck supple.   Lymphadenopathy:      Cervical: No cervical adenopathy.   Skin:     General: Skin is warm and dry.      Comments: Dark skin discoloration of lower extremities with some varicose veins.   Neurological:      General: No focal deficit present.      Mental Status: She is alert and oriented to person, place, and time.   Psychiatric:         Mood and Affect: Mood normal.         Behavior: Behavior normal.           CBC:  Recent Labs   Lab 08/10/21  1127 10/04/21  1057 10/11/21  0648   WBC 7.13 7.16 8.34   RBC 4.44 4.64 4.28   Hemoglobin 13.7 14.5 13.3   Hematocrit 41.6 43.6 38.9   Platelets 288 303 284   MCV 94 94 91   MCH 30.9 31.3 H 31.1 H   MCHC 32.9 33.3 34.2     CMP:  Recent Labs   Lab 06/26/21  0952 08/10/21  1127 11/16/21  0705 02/28/22  0739   Glucose 102   < > 101 103   Calcium 9.4   < > 9.7 9.3   Albumin 3.9  --  3.8 3.5   Total Protein 7.3  --  7.4 6.6   Sodium 143   < > 140 142   Potassium 4.6   < > 4.3 4.4   CO2 25   < > 23 26   Chloride 109   < > 108 111 H   BUN 13   < > 20 16   Creatinine 1.0   < > 1.1 1.1   Alkaline Phosphatase 125  --  116 89   ALT 21  --  16 19   AST 21  --  22 26   Total Bilirubin 0.5  --  0.4 0.4    < > = values in this interval not displayed.     URINALYSIS:  Recent Labs   Lab 05/23/21  1114   Color, UA Yellow   Specific Gravity, UA >=1.030 A   pH, UA 5.0    Protein, UA Negative   Bacteria Rare   Nitrite, UA Negative   Leukocytes, UA Negative      LIPIDS:  Recent Labs   Lab 05/22/21  2305 05/23/21  0535 06/26/21  0952 11/16/21  0705 02/28/22  0739   TSH 2.917 3.703  --  2.282  --    HDL 31 L 25 L 30 L 42 41   Cholesterol 239 H 216 H 130 182 134   Triglycerides 365 H 304 H 66 134 70   LDL Cholesterol 135.0 130.2 86.8 113.2 79.0   HDL/Cholesterol Ratio 13.0 L 11.6 L 23.1 23.1 30.6   Non-HDL Cholesterol 208 191 100 140 93   Total Cholesterol/HDL Ratio 7.7 H 8.6 H 4.3 4.3 3.3     TSH:  Recent Labs   Lab 05/22/21  2305 05/23/21  0535 11/16/21  0705   TSH 2.917 3.703 2.282       A1C:  Recent Labs   Lab 05/23/21  0535 06/26/21  0952 11/16/21  0705 02/28/22  0739   Hemoglobin A1C 5.4 5.6 5.7 H 5.6       Imaging:  Cardiac catheterization  · A successful PFO closure was performed.  · The estimated blood loss was none.     1) DAPT x 6 months then ASA x life.  2) Follow in 6 months with ECHO bubble study.  3) Antibiotic prophylaxis.  4) 2D echo yearly to evaluate septal occluder and rule out septal defects   or erosions.   5) Follow up with me PRN after 6 month visit and follow up with Dr. Patel as scheduled.     The procedure log was documented by Documenter: Aleshia Anna and   verified by Isra Juarez MD.    Date: 10/11/2021  Time: 9:01 AM      Assessment:       1. Cerebrovascular accident (CVA) due to thrombosis of precerebral artery    2. Embolic stroke involving left middle cerebral artery    3. Prediabetes    4. Mixed hyperlipidemia    5. Class 1 obesity due to excess calories with serious comorbidity in adult, unspecified BMI    6. Osteoarthritis of multiple joints, unspecified osteoarthritis type          Chronic conditions status updated as per HPI. Other than changes above, cont current medications and maintain follow up with specialist. Return to clinic in 6 months.  Plan:       Maria M was seen today for follow-up.    Diagnoses and all orders for this  visit:    Cerebrovascular accident (CVA) due to thrombosis of precerebral artery  -     CBC Auto Differential; Future  -     Comprehensive Metabolic Panel; Future  -     Lipid Panel; Future  - neurological is stable.  On Plavix, aspirin, and statin.    Embolic stroke involving left middle cerebral artery   - PFO was closed.  Will clarify use of dual anti-platelet treatment with Neurology and Cardiology.    Prediabetes  -     A1c improved.  Continue to monitor.  - Hemoglobin A1C; Future    Mixed hyperlipidemia  -     Lipid Panel; Future  - labs look good.    Class 1 obesity due to excess calories with serious comorbidity in adult, unspecified BMI   - continue to optimize lifestyle modifications.    Osteoarthritis of multiple joints, unspecified osteoarthritis type  -     cyclobenzaprine (FLEXERIL) 5 MG tablet; Take 1 tablet (5 mg total) by mouth 2 (two) times daily as needed for Muscle spasms.  -     nabumetone (RELAFEN) 500 MG tablet; Take 1 tablet (500 mg total) by mouth daily as needed for Pain.      Health Maintenance Due   Topic Date Due    COVID-19 Vaccine (1) Never done    Shingles Vaccine (1 of 2) Never done    TETANUS VACCINE  09/30/2015    Influenza Vaccine (1) 09/01/2021        Emelia Jones MD  [facility]  Disclaimer:  This note has been generated using voice-recognition software. There may be grammatical or spelling errors that have been missed during proof-reading

## 2022-05-16 ENCOUNTER — PATIENT MESSAGE (OUTPATIENT)
Dept: FAMILY MEDICINE | Facility: CLINIC | Age: 62
End: 2022-05-16
Payer: MEDICARE

## 2022-05-16 RX ORDER — HYDROXYZINE PAMOATE 25 MG/1
25 CAPSULE ORAL NIGHTLY PRN
Qty: 30 CAPSULE | Refills: 2 | Status: SHIPPED | OUTPATIENT
Start: 2022-05-16 | End: 2022-08-11

## 2022-05-16 RX ORDER — PANTOPRAZOLE SODIUM 20 MG/1
40 TABLET, DELAYED RELEASE ORAL DAILY
Qty: 30 TABLET | Refills: 0 | Status: SHIPPED | OUTPATIENT
Start: 2022-05-16 | End: 2022-06-09

## 2022-05-17 ENCOUNTER — HOSPITAL ENCOUNTER (OUTPATIENT)
Dept: CARDIOLOGY | Facility: HOSPITAL | Age: 62
Discharge: HOME | End: 2022-05-17
Attending: STUDENT IN AN ORGANIZED HEALTH CARE EDUCATION/TRAINING PROGRAM
Payer: MEDICARE

## 2022-05-17 ENCOUNTER — PATIENT MESSAGE (OUTPATIENT)
Dept: FAMILY MEDICINE | Facility: CLINIC | Age: 62
End: 2022-05-17
Payer: MEDICARE

## 2022-05-17 ENCOUNTER — OFFICE VISIT (OUTPATIENT)
Dept: CARDIOLOGY | Facility: CLINIC | Age: 62
End: 2022-05-17
Payer: MEDICARE

## 2022-05-17 VITALS
DIASTOLIC BLOOD PRESSURE: 62 MMHG | WEIGHT: 205 LBS | BODY MASS INDEX: 32.18 KG/M2 | SYSTOLIC BLOOD PRESSURE: 110 MMHG | HEIGHT: 67 IN | HEART RATE: 55 BPM

## 2022-05-17 VITALS
BODY MASS INDEX: 33.84 KG/M2 | DIASTOLIC BLOOD PRESSURE: 74 MMHG | SYSTOLIC BLOOD PRESSURE: 128 MMHG | WEIGHT: 215.63 LBS | HEIGHT: 67 IN | OXYGEN SATURATION: 97 % | HEART RATE: 56 BPM

## 2022-05-17 DIAGNOSIS — Q21.12 PFO (PATENT FORAMEN OVALE): ICD-10-CM

## 2022-05-17 DIAGNOSIS — Q21.12 PFO (PATENT FORAMEN OVALE): Primary | ICD-10-CM

## 2022-05-17 DIAGNOSIS — I63.9 CEREBROVASCULAR ACCIDENT (CVA), UNSPECIFIED MECHANISM: ICD-10-CM

## 2022-05-17 LAB
ASCENDING AORTA: 3.24 CM
AV INDEX (PROSTH): 0.95
AV MEAN GRADIENT: 4 MMHG
AV PEAK GRADIENT: 6 MMHG
AV VALVE AREA: 3.03 CM2
AV VELOCITY RATIO: 0.94
BSA FOR ECHO PROCEDURE: 2.1 M2
CV ECHO LV RWT: 0.27 CM
DOP CALC AO PEAK VEL: 1.26 M/S
DOP CALC AO VTI: 29.96 CM
DOP CALC LVOT AREA: 3.2 CM2
DOP CALC LVOT DIAMETER: 2.02 CM
DOP CALC LVOT PEAK VEL: 1.18 M/S
DOP CALC LVOT STROKE VOLUME: 90.84 CM3
DOP CALCLVOT PEAK VEL VTI: 28.36 CM
E WAVE DECELERATION TIME: 201.67 MSEC
E/A RATIO: 1.54
E/E' RATIO: 8 M/S
ECHO LV POSTERIOR WALL: 0.69 CM (ref 0.6–1.1)
EJECTION FRACTION: 55 %
FRACTIONAL SHORTENING: 30 % (ref 28–44)
INTERVENTRICULAR SEPTUM: 0.8 CM (ref 0.6–1.1)
LA MAJOR: 4.88 CM
LA MINOR: 5.13 CM
LA WIDTH: 3.6 CM
LEFT ATRIUM SIZE: 3.25 CM
LEFT ATRIUM VOLUME INDEX MOD: 24.8 ML/M2
LEFT ATRIUM VOLUME INDEX: 24.4 ML/M2
LEFT ATRIUM VOLUME MOD: 50.62 CM3
LEFT ATRIUM VOLUME: 49.74 CM3
LEFT INTERNAL DIMENSION IN SYSTOLE: 3.52 CM (ref 2.1–4)
LEFT VENTRICLE DIASTOLIC VOLUME INDEX: 58.8 ML/M2
LEFT VENTRICLE DIASTOLIC VOLUME: 119.95 ML
LEFT VENTRICLE MASS INDEX: 61 G/M2
LEFT VENTRICLE SYSTOLIC VOLUME INDEX: 25.3 ML/M2
LEFT VENTRICLE SYSTOLIC VOLUME: 51.51 ML
LEFT VENTRICULAR INTERNAL DIMENSION IN DIASTOLE: 5.03 CM (ref 3.5–6)
LEFT VENTRICULAR MASS: 125.32 G
LV LATERAL E/E' RATIO: 6.67 M/S
LV SEPTAL E/E' RATIO: 10 M/S
MV PEAK A VEL: 0.52 M/S
MV PEAK E VEL: 0.8 M/S
MV STENOSIS PRESSURE HALF TIME: 58.48 MS
MV VALVE AREA P 1/2 METHOD: 3.76 CM2
RA MAJOR: 4.47 CM
RA PRESSURE: 3 MMHG
RA WIDTH: 3.62 CM
RIGHT VENTRICULAR END-DIASTOLIC DIMENSION: 2.38 CM
RV TISSUE DOPPLER FREE WALL SYSTOLIC VELOCITY 1 (APICAL 4 CHAMBER VIEW): 12.45 CM/S
SINUS: 3.71 CM
STJ: 2.51 CM
TDI LATERAL: 0.12 M/S
TDI SEPTAL: 0.08 M/S
TDI: 0.1 M/S
TRICUSPID ANNULAR PLANE SYSTOLIC EXCURSION: 2.11 CM

## 2022-05-17 PROCEDURE — 93306 TTE W/DOPPLER COMPLETE: CPT | Mod: 26,,, | Performed by: INTERNAL MEDICINE

## 2022-05-17 PROCEDURE — 1159F MED LIST DOCD IN RCRD: CPT | Mod: CPTII,S$GLB,, | Performed by: INTERNAL MEDICINE

## 2022-05-17 PROCEDURE — 3044F PR MOST RECENT HEMOGLOBIN A1C LEVEL <7.0%: ICD-10-PCS | Mod: CPTII,S$GLB,, | Performed by: INTERNAL MEDICINE

## 2022-05-17 PROCEDURE — 3078F PR MOST RECENT DIASTOLIC BLOOD PRESSURE < 80 MM HG: ICD-10-PCS | Mod: CPTII,S$GLB,, | Performed by: INTERNAL MEDICINE

## 2022-05-17 PROCEDURE — 93306 ECHO (CUPID ONLY): ICD-10-PCS | Mod: 26,,, | Performed by: INTERNAL MEDICINE

## 2022-05-17 PROCEDURE — 3074F PR MOST RECENT SYSTOLIC BLOOD PRESSURE < 130 MM HG: ICD-10-PCS | Mod: CPTII,S$GLB,, | Performed by: INTERNAL MEDICINE

## 2022-05-17 PROCEDURE — 3074F SYST BP LT 130 MM HG: CPT | Mod: CPTII,S$GLB,, | Performed by: INTERNAL MEDICINE

## 2022-05-17 PROCEDURE — 99999 PR PBB SHADOW E&M-EST. PATIENT-LVL IV: CPT | Mod: PBBFAC,,, | Performed by: INTERNAL MEDICINE

## 2022-05-17 PROCEDURE — 3078F DIAST BP <80 MM HG: CPT | Mod: CPTII,S$GLB,, | Performed by: INTERNAL MEDICINE

## 2022-05-17 PROCEDURE — 3008F BODY MASS INDEX DOCD: CPT | Mod: CPTII,S$GLB,, | Performed by: INTERNAL MEDICINE

## 2022-05-17 PROCEDURE — 1160F RVW MEDS BY RX/DR IN RCRD: CPT | Mod: CPTII,S$GLB,, | Performed by: INTERNAL MEDICINE

## 2022-05-17 PROCEDURE — 1159F PR MEDICATION LIST DOCUMENTED IN MEDICAL RECORD: ICD-10-PCS | Mod: CPTII,S$GLB,, | Performed by: INTERNAL MEDICINE

## 2022-05-17 PROCEDURE — 99214 PR OFFICE/OUTPT VISIT, EST, LEVL IV, 30-39 MIN: ICD-10-PCS | Mod: S$GLB,,, | Performed by: INTERNAL MEDICINE

## 2022-05-17 PROCEDURE — 3008F PR BODY MASS INDEX (BMI) DOCUMENTED: ICD-10-PCS | Mod: CPTII,S$GLB,, | Performed by: INTERNAL MEDICINE

## 2022-05-17 PROCEDURE — 3044F HG A1C LEVEL LT 7.0%: CPT | Mod: CPTII,S$GLB,, | Performed by: INTERNAL MEDICINE

## 2022-05-17 PROCEDURE — 1160F PR REVIEW ALL MEDS BY PRESCRIBER/CLIN PHARMACIST DOCUMENTED: ICD-10-PCS | Mod: CPTII,S$GLB,, | Performed by: INTERNAL MEDICINE

## 2022-05-17 PROCEDURE — 99214 OFFICE O/P EST MOD 30 MIN: CPT | Mod: S$GLB,,, | Performed by: INTERNAL MEDICINE

## 2022-05-17 PROCEDURE — 99999 PR PBB SHADOW E&M-EST. PATIENT-LVL IV: ICD-10-PCS | Mod: PBBFAC,,, | Performed by: INTERNAL MEDICINE

## 2022-05-17 PROCEDURE — 93306 TTE W/DOPPLER COMPLETE: CPT

## 2022-05-17 RX ORDER — DICLOFENAC SODIUM 50 MG/1
50 TABLET, DELAYED RELEASE ORAL 2 TIMES DAILY PRN
Qty: 60 TABLET | Refills: 1 | Status: SHIPPED | OUTPATIENT
Start: 2022-05-17 | End: 2022-05-17

## 2022-05-17 RX ORDER — DICLOFENAC SODIUM 75 MG/1
75 TABLET, DELAYED RELEASE ORAL 2 TIMES DAILY PRN
Qty: 60 TABLET | Refills: 1 | Status: SHIPPED | OUTPATIENT
Start: 2022-05-17 | End: 2022-07-07

## 2022-05-17 NOTE — PROGRESS NOTES
Interventional Cardiology Clinic Note  Reason for Visit: PFO follow up    HPI:   Patient is a 61-year-old lady who is here for follow after PFO closure.    Patient was last seen in August when she underwent PFO closure with a 35 mm PFO device she had a bubble postoperatively which was negative.  She presents today for follow-up she has had no issues and had an echo today which reveals no intracardiac shunts and stable device.  She has been taking her aspirin and Plavix.    Review of Systems   All other systems reviewed and are negative.      PMH:     Past Medical History:   Diagnosis Date    COVID-19 12/2020    Hyperlipidemia     MI (myocardial infarction)     Stroke      Past Surgical History:   Procedure Laterality Date    ANKLE SURGERY      CHOLECYSTECTOMY       Allergies:     Review of patient's allergies indicates:   Allergen Reactions    Codeine Itching     Medications:     Current Outpatient Medications on File Prior to Visit   Medication Sig Dispense Refill    ascorbic acid, vitamin C, (VITAMIN C) 250 MG tablet Take 250 mg by mouth once daily.      aspirin 81 MG Chew Chew and swallow 1 tablet (81 mg total) by mouth once daily. 30 tablet 0    atorvastatin (LIPITOR) 40 MG tablet Take 1 tablet (40 mg total) by mouth once daily. 90 tablet 3    buPROPion (WELLBUTRIN XL) 150 MG TB24 tablet Take 2 tablets (300 mg total) by mouth once daily. 60 tablet 11    clopidogreL (PLAVIX) 75 mg tablet Take 1 tablet (75 mg total) by mouth once daily. 30 tablet 11    cyclobenzaprine (FLEXERIL) 5 MG tablet TAKE 1 TABLET TWICE DAILY AS NEEDED FOR MUSCLE SPASMS. 30 tablet 2    hydrOXYzine pamoate (VISTARIL) 25 MG Cap Take 1 capsule (25 mg total) by mouth nightly as needed (sleep). 30 capsule 2    nicotine (NICODERM CQ) 21 mg/24 hr Place 1 patch onto the skin once daily. 28 patch 0    nicotine, polacrilex, (NICORETTE) 2 mg Gum Take 1 each (2 mg total) by mouth as needed (as needed). 100 each 0    pantoprazole  "(PROTONIX) 20 MG tablet Take 2 tablets (40 mg total) by mouth once daily. 30 tablet 0    VENTOLIN HFA 90 mcg/actuation inhaler Inhale 2 puffs into the lungs every 6 (six) hours as needed for Wheezing. 18 g 5    zinc gluconate 50 mg tablet Take 50 mg by mouth once daily.      calcium carbonate 1250 MG capsule Take 1,250 mg by mouth 2 (two) times daily with meals.      coenzyme Q10 10 mg capsule Take 10 mg by mouth once daily.      ergocalciferol (ERGOCALCIFEROL) 50,000 unit Cap Take 1,000 Units by mouth once daily.      FLAXSEED ORAL Take 1 tablet by mouth once daily.      nabumetone (RELAFEN) 500 MG tablet Take 1 tablet (500 mg total) by mouth daily as needed for Pain. 30 tablet 0    nicotine (NICODERM CQ) 21 mg/24 hr Place 2 patches onto the skin once daily. Dose approptiate 28 patch 0     No current facility-administered medications on file prior to visit.     Social History:     Social History     Tobacco Use    Smoking status: Former Smoker     Packs/day: 0.50     Types: Cigarettes     Quit date: 2022     Years since quittin.3    Smokeless tobacco: Never Used   Substance Use Topics    Alcohol use: Not Currently     Alcohol/week: 1.0 standard drink     Types: 1 Standard drinks or equivalent per week     Comment: Socially     Family History:     Family History   Problem Relation Age of Onset    Stroke Father     Heart disease Father     Diabetes Mother     Hypertension Mother     Lung cancer Mother     Arthritis Sister     Diabetes Mellitus Brother     Heart disease Brother     No Known Problems Maternal Grandmother     No Known Problems Maternal Grandfather     No Known Problems Paternal Grandmother     No Known Problems Paternal Grandfather     Bipolar disorder Daughter        Physical Exam  /74 (BP Location: Left arm, Patient Position: Sitting, BP Method: Large (Automatic))   Pulse (!) 56   Ht 5' 7" (1.702 m)   Wt 97.8 kg (215 lb 9.8 oz)   SpO2 97%   BMI 33.77 kg/m²  "   GEN: Alert and oriented in NAD  NECK: no JVD appreciated  CVS: RRR, s1/s2, no MRG  PULM: CTAB no rales  ABD: NT/ND BS +  Extremities: warm and dry, palpable pulses, no edema  NEURO: Alert and oriented x 3  PSYCH: appropriate affect.         Labs:     Lab Results   Component Value Date     02/28/2022    K 4.4 02/28/2022     (H) 02/28/2022    CO2 26 02/28/2022    BUN 16 02/28/2022    CREATININE 1.1 02/28/2022    ANIONGAP 5 (L) 02/28/2022     Lab Results   Component Value Date    HGBA1C 5.6 02/28/2022     Lab Results   Component Value Date    BNP 16 05/22/2021    BNP <10 07/22/2016    Lab Results   Component Value Date    WBC 8.34 10/11/2021    HGB 13.3 10/11/2021    HCT 38.9 10/11/2021     10/11/2021    GRAN 3.8 05/24/2021    GRAN 46.3 05/24/2021     Lab Results   Component Value Date    CHOL 134 02/28/2022    HDL 41 02/28/2022    LDLCALC 79.0 02/28/2022    TRIG 70 02/28/2022          EF   Date Value Ref Range Status   05/17/2022 55 % Final   07/23/2021 50 % Final   07/06/2021 55 % Final         Assessment and Plan  Maria M Mei is a 61 y.o. lady presents for PFO closure follow-up.      ICD-10-CM ICD-9-CM    1. PFO (patent foramen ovale)   Patient is a 61-year-old lady who presents for follow-up of PFO after closure 6 months ago.  She has done well and no complications she has remained on aspirin and Plavix.  Echo today reveals no intracardiac shunt.  Will stop the Plavix at this time and continue aspirin lifelong. Q21.1 745.5    2. Cerebrovascular accident (CVA), unspecified mechanism   PFO closure as above.  I63.9 434.91         Signed:        Leora Alva MD  Interventional Cardiology Fellow  Pager [phone]      Interventional Cardiology Staff  I have personally taken the history and examined this patient. I have discussed and agree with the resident's findings and plan as documented in the resident's note.  61-year-old female status post PFO closure 6 months ago who returns for echo  bubble study.  Results were reviewed with patient in demonstrate no crossing septum of bubbles and an intact device.  Follow-up with primary cardiologist Dr. Nakul [facility] and with me suzie.    Isra Juarez

## 2022-05-25 ENCOUNTER — TELEPHONE (OUTPATIENT)
Dept: FAMILY MEDICINE | Facility: CLINIC | Age: 62
End: 2022-05-25
Payer: MEDICARE

## 2022-05-25 NOTE — TELEPHONE ENCOUNTER
----- Message from Shameka Jones sent at 5/25/2022  4:24 PM CDT -----  Contact: [phone]/Keanu with [facility]  Type:  Pharmacy Calling to Clarify an RX    Name of Caller:Keanu  Pharmacy Name:[facility]  Prescription Name:diclofenac (VOLTAREN) 75 MG EC tablet  What do they need to clarify?:Should be 50 or 75MG  Best Call Back Number:[phone]  Additional Information: N/a

## 2022-05-26 ENCOUNTER — CLINICAL SUPPORT (OUTPATIENT)
Dept: SMOKING CESSATION | Facility: CLINIC | Age: 62
End: 2022-05-26
Payer: COMMERCIAL

## 2022-05-26 DIAGNOSIS — F17.200 NICOTINE DEPENDENCE: Primary | ICD-10-CM

## 2022-05-26 PROCEDURE — 99407 BEHAV CHNG SMOKING > 10 MIN: CPT | Mod: S$GLB,,,

## 2022-05-26 PROCEDURE — 99407 PR TOBACCO USE CESSATION INTENSIVE >10 MINUTES: ICD-10-PCS | Mod: S$GLB,,,

## 2022-05-26 NOTE — PROGRESS NOTES
Spoke with patient today in regard to smoking cessation progress for 3/6 month telephone follow up, she states tobacco free. Commended patient on the accomplishment thus far. Patient states the use of nicotine patch and gum to help aid in her quit. Informed patient of benefit period, future follow up, and contact information if any further help or support is needed. Will resolve episode and complete smart form for Quit attempt #1 along with 3 and 6 month follow up on Quit #2.

## 2022-06-09 ENCOUNTER — PATIENT MESSAGE (OUTPATIENT)
Dept: FAMILY MEDICINE | Facility: CLINIC | Age: 62
End: 2022-06-09
Payer: MEDICARE

## 2022-06-09 RX ORDER — PANTOPRAZOLE SODIUM 40 MG/1
40 TABLET, DELAYED RELEASE ORAL DAILY
Qty: 90 TABLET | Refills: 1 | Status: SHIPPED | OUTPATIENT
Start: 2022-06-09 | End: 2022-06-13 | Stop reason: SDUPTHER

## 2022-06-13 ENCOUNTER — PATIENT MESSAGE (OUTPATIENT)
Dept: FAMILY MEDICINE | Facility: CLINIC | Age: 62
End: 2022-06-13
Payer: MEDICARE

## 2022-06-13 RX ORDER — PANTOPRAZOLE SODIUM 40 MG/1
40 TABLET, DELAYED RELEASE ORAL DAILY
Qty: 90 TABLET | Refills: 1 | Status: SHIPPED | OUTPATIENT
Start: 2022-06-13 | End: 2022-12-01

## 2022-06-13 NOTE — TELEPHONE ENCOUNTER
No new care gaps identified.  [facility] Embedded Care Gaps. Reference number: [id number]. 6/13/2022   11:44:07 AM NAZT

## 2022-06-18 RX ORDER — ATORVASTATIN CALCIUM 40 MG/1
TABLET, FILM COATED ORAL
Qty: 90 TABLET | Refills: 2 | Status: SHIPPED | OUTPATIENT
Start: 2022-06-18 | End: 2023-06-22

## 2022-06-18 NOTE — TELEPHONE ENCOUNTER
No new care gaps identified.  [facility] Embedded Care Gaps. Reference number: [id number]. 6/18/2022   4:05:45 PM CDT

## 2022-06-19 NOTE — TELEPHONE ENCOUNTER
Refill Decision Note   Maria M Sigifredo  is requesting a refill authorization.  Brief Assessment and Rationale for Refill:  Approve     Medication Therapy Plan:       Medication Reconciliation Completed: No   Comments:     No Care Gaps recommended.     Note composed:9:29 PM 06/18/2022

## 2022-07-07 ENCOUNTER — PATIENT MESSAGE (OUTPATIENT)
Dept: FAMILY MEDICINE | Facility: CLINIC | Age: 62
End: 2022-07-07
Payer: MEDICARE

## 2022-07-19 ENCOUNTER — PES CALL (OUTPATIENT)
Dept: ADMINISTRATIVE | Facility: CLINIC | Age: 62
End: 2022-07-19
Payer: MEDICARE

## 2022-07-26 ENCOUNTER — PES CALL (OUTPATIENT)
Dept: ADMINISTRATIVE | Facility: CLINIC | Age: 62
End: 2022-07-26
Payer: MEDICARE

## 2022-07-26 PROBLEM — E66.811 CLASS 1 OBESITY DUE TO EXCESS CALORIES IN ADULT: Chronic | Status: RESOLVED | Noted: 2021-07-15 | Resolved: 2022-07-26

## 2022-07-26 PROBLEM — Z86.73 HISTORY OF CVA (CEREBROVASCULAR ACCIDENT): Status: ACTIVE | Noted: 2021-05-23

## 2022-07-26 PROBLEM — Z87.891 PERSONAL HISTORY OF TOBACCO USE: Status: ACTIVE | Noted: 2021-05-23

## 2022-07-26 PROBLEM — E66.09 CLASS 1 OBESITY DUE TO EXCESS CALORIES IN ADULT: Chronic | Status: RESOLVED | Noted: 2021-07-15 | Resolved: 2022-07-26

## 2022-07-26 NOTE — PROGRESS NOTES
"  Maria M Mei presented for a  Medicare AWV and comprehensive Health Risk Assessment today. The following components were reviewed and updated:    · Medical history  · Family History  · Social history  · Allergies and Current Medications  · Health Risk Assessment  · Health Maintenance  · Care Team         ** See Completed Assessments for Annual Wellness Visit within the encounter summary.**         The following assessments were completed:  · Living Situation  · CAGE  · Depression Screening  · Timed Get Up and Go  · Whisper Test  · Cognitive Function Screening  · Nutrition Screening  · ADL Screening  · PAQ Screening        Vitals:    08/02/22 0848   BP: 110/80   Pulse: 84   SpO2: 98%   Weight: 101.2 kg (223 lb 1.7 oz)   Height: 5' 7" (1.702 m)     Body mass index is 34.94 kg/m².  Physical Exam  Vitals and nursing note reviewed.   Constitutional:       General: She is not in acute distress.     Appearance: Normal appearance. She is obese. She is not ill-appearing.   HENT:      Head: Normocephalic and atraumatic.   Eyes:      General: No scleral icterus.        Right eye: No discharge.         Left eye: No discharge.      Extraocular Movements: Extraocular movements intact.      Conjunctiva/sclera: Conjunctivae normal.   Cardiovascular:      Rate and Rhythm: Normal rate and regular rhythm.      Heart sounds: Normal heart sounds. No murmur heard.  Pulmonary:      Effort: Pulmonary effort is normal. No respiratory distress.      Breath sounds: Normal breath sounds. No wheezing, rhonchi or rales.   Musculoskeletal:      Cervical back: Normal range of motion.      Right lower leg: No edema.      Left lower leg: No edema.   Skin:     General: Skin is warm and dry.      Findings: No rash.   Neurological:      Mental Status: She is alert and oriented to person, place, and time.   Psychiatric:         Mood and Affect: Mood normal.         Behavior: Behavior normal. Behavior is cooperative.         Cognition and Memory: " Cognition and memory normal.               Diagnoses and health risks identified today and associated recommendations/orders:    1. Encounter for preventive health examination  - Chart reviewed. Problem list updated. Discussed current medical diagnosis, current medications, medical/surgical/family/social history; updated provider list; documented vital signs; identified any cognitive impairment; and updated risk factor list. Addressed any outstanding health maintenance. Provided patient with personalized health advice. Continue to follow up with PCP and any specialists.       2. Simple chronic bronchitis  Chronic; stable on current treatment plan; follow up with PCP  - uses albuterol inhaler PRN  - plans to quit smoking, weaning self down    3. Cytotoxic cerebral edema  Chronic; stable on current treatment plan; follow up with PCP  - follows with Neurology    4. History of Amplatzer atrial septal defect closure  Chronic; stable on current treatment plan; follow up with PCP  - follows with Cardiology    5. Mixed hyperlipidemia  Chronic; stable on current treatment plan; follow up with PCP  - taking statin     6. PFO (patent foramen ovale)  Chronic; stable on current treatment plan; follow up with PCP  - follows with cardiology    7. Prediabetes  Chronic; stable on current treatment plan; follow up with PCP  - recommend weight loss    8. Other osteoarthritis involving multiple joints  Chronic; stable on current treatment plan; follow up with PCP    9. History of CVA (cerebrovascular accident)  Chronic; stable on current treatment plan; follow up with PCP    10. Shortness of breath  Chronic; stable on current treatment plan; follow up with PCP  - needs refill albuterol, current Rx ; having trouble using red inhaler, education provided today   - albuterol (VENTOLIN HFA) 90 mcg/actuation inhaler; Inhale 2 puffs into the lungs every 6 (six) hours as needed for Wheezing or Shortness of Breath.  Rescue  Dispense: 18  g; Refill: 0    11. Encounter for screening mammogram for malignant neoplasm of breast  - due for mammo, ordered today   - Mammo Digital Screening Bilat w/ Zac; Future    12. Personal history of tobacco use  Chronic; stable on current treatment plan; follow up with PCP  - using nicotine patches, wellbutrin  - cut down to 2 cig/day  - plans to quit; encouraged to set quit date  - declined referral to smoking cessation    13. Obesity (BMI 30.0-34.9)  - Recommendation for healthy diet and increasing exercise as tolerated with goal of 150min/week . Recommend weight loss        Provided Maria M with a 5-10 year written screening schedule and personal prevention plan. Recommendations were developed using the USPSTF age appropriate recommendations. Education, counseling, and referrals were provided as needed. After Visit Summary printed and given to patient which includes a list of additional screenings\tests needed.    Follow up in about 1 year (around 8/2/2023) for your next annual wellness visit.    Ruth Cruz, MSN, APRN, FNP-C  Family Medicine  Office [phone]    Advance Care Planning         I offered to discuss advanced care planning, including how to pick a person who would make decisions for you if you were unable to make them for yourself, called a health care power of , and what kind of decisions you might make such as use of life sustaining treatments such as ventilators and tube feeding when faced with a life limiting illness recorded on a living will that they will need to know. (How you want to be cared for as you near the end of your natural life)     X Patient is interested in learning more about how to make advanced directives.  I provided them paperwork and offered to discuss this with them.

## 2022-08-02 ENCOUNTER — OFFICE VISIT (OUTPATIENT)
Dept: FAMILY MEDICINE | Facility: CLINIC | Age: 62
End: 2022-08-02
Payer: MEDICARE

## 2022-08-02 VITALS
SYSTOLIC BLOOD PRESSURE: 110 MMHG | WEIGHT: 223.13 LBS | HEIGHT: 67 IN | OXYGEN SATURATION: 98 % | HEART RATE: 84 BPM | BODY MASS INDEX: 35.02 KG/M2 | DIASTOLIC BLOOD PRESSURE: 80 MMHG

## 2022-08-02 DIAGNOSIS — Z86.73 HISTORY OF CVA (CEREBROVASCULAR ACCIDENT): ICD-10-CM

## 2022-08-02 DIAGNOSIS — E66.9 OBESITY (BMI 30.0-34.9): ICD-10-CM

## 2022-08-02 DIAGNOSIS — Z87.891 PERSONAL HISTORY OF TOBACCO USE: ICD-10-CM

## 2022-08-02 DIAGNOSIS — E78.2 MIXED HYPERLIPIDEMIA: ICD-10-CM

## 2022-08-02 DIAGNOSIS — Z87.74 HISTORY OF AMPLATZER ATRIAL SEPTAL DEFECT CLOSURE: Chronic | ICD-10-CM

## 2022-08-02 DIAGNOSIS — M15.8 OTHER OSTEOARTHRITIS INVOLVING MULTIPLE JOINTS: ICD-10-CM

## 2022-08-02 DIAGNOSIS — R06.02 SHORTNESS OF BREATH: ICD-10-CM

## 2022-08-02 DIAGNOSIS — Q21.12 PFO (PATENT FORAMEN OVALE): Chronic | ICD-10-CM

## 2022-08-02 DIAGNOSIS — R73.03 PREDIABETES: ICD-10-CM

## 2022-08-02 DIAGNOSIS — J41.0 SIMPLE CHRONIC BRONCHITIS: ICD-10-CM

## 2022-08-02 DIAGNOSIS — Z00.00 ENCOUNTER FOR PREVENTIVE HEALTH EXAMINATION: Primary | ICD-10-CM

## 2022-08-02 DIAGNOSIS — G93.6 CYTOTOXIC CEREBRAL EDEMA: ICD-10-CM

## 2022-08-02 DIAGNOSIS — Z12.31 ENCOUNTER FOR SCREENING MAMMOGRAM FOR MALIGNANT NEOPLASM OF BREAST: ICD-10-CM

## 2022-08-02 PROCEDURE — 99999 PR PBB SHADOW E&M-EST. PATIENT-LVL IV: ICD-10-PCS | Mod: PBBFAC,,, | Performed by: NURSE PRACTITIONER

## 2022-08-02 PROCEDURE — 1159F PR MEDICATION LIST DOCUMENTED IN MEDICAL RECORD: ICD-10-PCS | Mod: CPTII,S$GLB,, | Performed by: NURSE PRACTITIONER

## 2022-08-02 PROCEDURE — 1159F MED LIST DOCD IN RCRD: CPT | Mod: CPTII,S$GLB,, | Performed by: NURSE PRACTITIONER

## 2022-08-02 PROCEDURE — 1160F PR REVIEW ALL MEDS BY PRESCRIBER/CLIN PHARMACIST DOCUMENTED: ICD-10-PCS | Mod: CPTII,S$GLB,, | Performed by: NURSE PRACTITIONER

## 2022-08-02 PROCEDURE — 3008F BODY MASS INDEX DOCD: CPT | Mod: CPTII,S$GLB,, | Performed by: NURSE PRACTITIONER

## 2022-08-02 PROCEDURE — 99999 PR PBB SHADOW E&M-EST. PATIENT-LVL IV: CPT | Mod: PBBFAC,,, | Performed by: NURSE PRACTITIONER

## 2022-08-02 PROCEDURE — 3044F HG A1C LEVEL LT 7.0%: CPT | Mod: CPTII,S$GLB,, | Performed by: NURSE PRACTITIONER

## 2022-08-02 PROCEDURE — 3044F PR MOST RECENT HEMOGLOBIN A1C LEVEL <7.0%: ICD-10-PCS | Mod: CPTII,S$GLB,, | Performed by: NURSE PRACTITIONER

## 2022-08-02 PROCEDURE — 3008F PR BODY MASS INDEX (BMI) DOCUMENTED: ICD-10-PCS | Mod: CPTII,S$GLB,, | Performed by: NURSE PRACTITIONER

## 2022-08-02 PROCEDURE — G0439 PPPS, SUBSEQ VISIT: HCPCS | Mod: S$GLB,,, | Performed by: NURSE PRACTITIONER

## 2022-08-02 PROCEDURE — G0439 PR MEDICARE ANNUAL WELLNESS SUBSEQUENT VISIT: ICD-10-PCS | Mod: S$GLB,,, | Performed by: NURSE PRACTITIONER

## 2022-08-02 PROCEDURE — 1160F RVW MEDS BY RX/DR IN RCRD: CPT | Mod: CPTII,S$GLB,, | Performed by: NURSE PRACTITIONER

## 2022-08-02 RX ORDER — ALBUTEROL SULFATE 90 UG/1
2 AEROSOL, METERED RESPIRATORY (INHALATION) EVERY 6 HOURS PRN
Qty: 18 G | Refills: 0 | Status: SHIPPED | OUTPATIENT
Start: 2022-08-02 | End: 2023-12-05 | Stop reason: SDUPTHER

## 2022-08-02 NOTE — PATIENT INSTRUCTIONS
Counseling and Referral of Other Preventative  (Italic type indicates deductible and co-insurance are waived)    Patient Name: Maria M Mei  Today's Date: 8/2/2022    Health Maintenance       Date Due Completion Date    Mammogram 05/25/2022 5/25/2021    TETANUS VACCINE 08/02/2023 (Originally 9/30/2015) 9/30/2005    Shingles Vaccine (1 of 2) 08/02/2023 (Originally 7/23/2011) ---    COVID-19 Vaccine (1) 08/02/2023 (Originally 1/23/1961) ---    Pneumococcal Vaccines (Age 0-64) (1 - PCV) 08/02/2023 (Originally [birth year]/23/1967) ---    Influenza Vaccine (1) 09/01/2022 10/10/2016    High Dose Statin 08/02/2023 8/2/2022    Cervical Cancer Screening 06/21/2024 6/21/2021    Colorectal Cancer Screening 11/30/2026 11/30/2016    Lipid Panel 02/28/2027 2/28/2022        Orders Placed This Encounter   Procedures    Mammo Digital Screening Bilat w/ Zac     The following information is provided to all patients.  This information is to help you find resources for any of the problems found today that may be affecting your health:                Living healthy guide: www.[facility].[place].gov      Understanding Diabetes: www.diabetes.org      Eating healthy: www.cdc.gov/healthyweight      CDC home safety checklist: www.cdc.gov/steadi/patient.html      Agency on Aging: www.goea.[place].gov      Alcoholics anonymous (AA): www.aa.org      Physical Activity: www.gabo.nih.gov/xm4vyue      Tobacco use: www.quitwithusla.org

## 2022-08-11 RX ORDER — HYDROXYZINE PAMOATE 25 MG/1
CAPSULE ORAL
Qty: 30 CAPSULE | Refills: 2 | Status: SHIPPED | OUTPATIENT
Start: 2022-08-11 | End: 2023-04-03

## 2022-08-22 ENCOUNTER — HOSPITAL ENCOUNTER (OUTPATIENT)
Dept: RADIOLOGY | Facility: HOSPITAL | Age: 62
Discharge: HOME | End: 2022-08-22
Attending: NURSE PRACTITIONER
Payer: MEDICARE

## 2022-08-22 DIAGNOSIS — Z12.31 ENCOUNTER FOR SCREENING MAMMOGRAM FOR MALIGNANT NEOPLASM OF BREAST: ICD-10-CM

## 2022-08-22 PROCEDURE — 77067 SCR MAMMO BI INCL CAD: CPT | Mod: 26,,, | Performed by: RADIOLOGY

## 2022-08-22 PROCEDURE — 77067 MAMMO DIGITAL SCREENING BILAT WITH TOMO: ICD-10-PCS | Mod: 26,,, | Performed by: RADIOLOGY

## 2022-08-22 PROCEDURE — 77063 BREAST TOMOSYNTHESIS BI: CPT | Mod: 26,,, | Performed by: RADIOLOGY

## 2022-08-22 PROCEDURE — 77063 MAMMO DIGITAL SCREENING BILAT WITH TOMO: ICD-10-PCS | Mod: 26,,, | Performed by: RADIOLOGY

## 2022-08-22 PROCEDURE — 77063 BREAST TOMOSYNTHESIS BI: CPT | Mod: TC

## 2022-08-22 PROCEDURE — 77067 SCR MAMMO BI INCL CAD: CPT | Mod: TC

## 2022-08-26 ENCOUNTER — LAB VISIT (OUTPATIENT)
Dept: LAB | Facility: HOSPITAL | Age: 62
End: 2022-08-26
Attending: INTERNAL MEDICINE
Payer: MEDICARE

## 2022-08-26 DIAGNOSIS — R73.03 PREDIABETES: ICD-10-CM

## 2022-08-26 DIAGNOSIS — E78.2 MIXED HYPERLIPIDEMIA: ICD-10-CM

## 2022-08-26 DIAGNOSIS — I63.00 CEREBROVASCULAR ACCIDENT (CVA) DUE TO THROMBOSIS OF PRECEREBRAL ARTERY: ICD-10-CM

## 2022-08-26 LAB
ALBUMIN SERPL BCP-MCNC: 3.8 G/DL (ref 3.5–5.2)
ALP SERPL-CCNC: 114 U/L (ref 55–135)
ALT SERPL W/O P-5'-P-CCNC: 30 U/L (ref 10–44)
ANION GAP SERPL CALC-SCNC: 11 MMOL/L (ref 8–16)
AST SERPL-CCNC: 29 U/L (ref 10–40)
BASOPHILS # BLD AUTO: 0.08 K/UL (ref 0–0.2)
BASOPHILS NFR BLD: 1 % (ref 0–1.9)
BILIRUB SERPL-MCNC: 0.5 MG/DL (ref 0.1–1)
BUN SERPL-MCNC: 20 MG/DL (ref 8–23)
CALCIUM SERPL-MCNC: 9.3 MG/DL (ref 8.7–10.5)
CHLORIDE SERPL-SCNC: 108 MMOL/L (ref 95–110)
CHOLEST SERPL-MCNC: 144 MG/DL (ref 120–199)
CHOLEST/HDLC SERPL: 4 {RATIO} (ref 2–5)
CO2 SERPL-SCNC: 24 MMOL/L (ref 23–29)
CREAT SERPL-MCNC: 0.9 MG/DL (ref 0.5–1.4)
DIFFERENTIAL METHOD: NORMAL
EOSINOPHIL # BLD AUTO: 0.4 K/UL (ref 0–0.5)
EOSINOPHIL NFR BLD: 4.8 % (ref 0–8)
ERYTHROCYTE [DISTWIDTH] IN BLOOD BY AUTOMATED COUNT: 13.7 % (ref 11.5–14.5)
EST. GFR  (NO RACE VARIABLE): >60 ML/MIN/1.73 M^2
ESTIMATED AVG GLUCOSE: 120 MG/DL (ref 68–131)
GLUCOSE SERPL-MCNC: 103 MG/DL (ref 70–110)
HBA1C MFR BLD: 5.8 % (ref 4–5.6)
HCT VFR BLD AUTO: 40.2 % (ref 37–48.5)
HDLC SERPL-MCNC: 36 MG/DL (ref 40–75)
HDLC SERPL: 25 % (ref 20–50)
HGB BLD-MCNC: 13.5 G/DL (ref 12–16)
IMM GRANULOCYTES # BLD AUTO: 0.01 K/UL (ref 0–0.04)
IMM GRANULOCYTES NFR BLD AUTO: 0.1 % (ref 0–0.5)
LDLC SERPL CALC-MCNC: 88 MG/DL (ref 63–159)
LYMPHOCYTES # BLD AUTO: 2.8 K/UL (ref 1–4.8)
LYMPHOCYTES NFR BLD: 34.1 % (ref 18–48)
MCH RBC QN AUTO: 30.7 PG (ref 27–31)
MCHC RBC AUTO-ENTMCNC: 33.6 G/DL (ref 32–36)
MCV RBC AUTO: 91 FL (ref 82–98)
MONOCYTES # BLD AUTO: 0.6 K/UL (ref 0.3–1)
MONOCYTES NFR BLD: 6.8 % (ref 4–15)
NEUTROPHILS # BLD AUTO: 4.3 K/UL (ref 1.8–7.7)
NEUTROPHILS NFR BLD: 53.2 % (ref 38–73)
NONHDLC SERPL-MCNC: 108 MG/DL
NRBC BLD-RTO: 0 /100 WBC
PLATELET # BLD AUTO: 316 K/UL (ref 150–450)
PMV BLD AUTO: 9.2 FL (ref 9.2–12.9)
POTASSIUM SERPL-SCNC: 3.9 MMOL/L (ref 3.5–5.1)
PROT SERPL-MCNC: 6.9 G/DL (ref 6–8.4)
RBC # BLD AUTO: 4.4 M/UL (ref 4–5.4)
SODIUM SERPL-SCNC: 143 MMOL/L (ref 136–145)
TRIGL SERPL-MCNC: 100 MG/DL (ref 30–150)
WBC # BLD AUTO: 8.09 K/UL (ref 3.9–12.7)

## 2022-08-26 PROCEDURE — 85025 COMPLETE CBC W/AUTO DIFF WBC: CPT | Performed by: INTERNAL MEDICINE

## 2022-08-26 PROCEDURE — 80061 LIPID PANEL: CPT | Performed by: INTERNAL MEDICINE

## 2022-08-26 PROCEDURE — 83036 HEMOGLOBIN GLYCOSYLATED A1C: CPT | Performed by: INTERNAL MEDICINE

## 2022-08-26 PROCEDURE — 36415 COLL VENOUS BLD VENIPUNCTURE: CPT | Performed by: INTERNAL MEDICINE

## 2022-08-26 PROCEDURE — 80053 COMPREHEN METABOLIC PANEL: CPT | Performed by: INTERNAL MEDICINE

## 2022-09-02 ENCOUNTER — OFFICE VISIT (OUTPATIENT)
Dept: FAMILY MEDICINE | Facility: CLINIC | Age: 62
End: 2022-09-02
Payer: MEDICARE

## 2022-09-02 VITALS
SYSTOLIC BLOOD PRESSURE: 118 MMHG | WEIGHT: 218.06 LBS | BODY MASS INDEX: 34.22 KG/M2 | DIASTOLIC BLOOD PRESSURE: 82 MMHG | OXYGEN SATURATION: 97 % | HEIGHT: 67 IN | HEART RATE: 70 BPM

## 2022-09-02 DIAGNOSIS — E66.9 CLASS 1 OBESITY WITH BODY MASS INDEX (BMI) OF 34.0 TO 34.9 IN ADULT, UNSPECIFIED OBESITY TYPE, UNSPECIFIED WHETHER SERIOUS COMORBIDITY PRESENT: ICD-10-CM

## 2022-09-02 DIAGNOSIS — R73.03 PREDIABETES: Primary | ICD-10-CM

## 2022-09-02 DIAGNOSIS — E78.2 MIXED HYPERLIPIDEMIA: ICD-10-CM

## 2022-09-02 DIAGNOSIS — I63.412 EMBOLIC STROKE INVOLVING LEFT MIDDLE CEREBRAL ARTERY: ICD-10-CM

## 2022-09-02 DIAGNOSIS — F41.9 ANXIETY: ICD-10-CM

## 2022-09-02 PROCEDURE — 1159F MED LIST DOCD IN RCRD: CPT | Mod: CPTII,S$GLB,, | Performed by: INTERNAL MEDICINE

## 2022-09-02 PROCEDURE — 3074F SYST BP LT 130 MM HG: CPT | Mod: CPTII,S$GLB,, | Performed by: INTERNAL MEDICINE

## 2022-09-02 PROCEDURE — 3044F PR MOST RECENT HEMOGLOBIN A1C LEVEL <7.0%: ICD-10-PCS | Mod: CPTII,S$GLB,, | Performed by: INTERNAL MEDICINE

## 2022-09-02 PROCEDURE — 1160F RVW MEDS BY RX/DR IN RCRD: CPT | Mod: CPTII,S$GLB,, | Performed by: INTERNAL MEDICINE

## 2022-09-02 PROCEDURE — 3044F HG A1C LEVEL LT 7.0%: CPT | Mod: CPTII,S$GLB,, | Performed by: INTERNAL MEDICINE

## 2022-09-02 PROCEDURE — 1160F PR REVIEW ALL MEDS BY PRESCRIBER/CLIN PHARMACIST DOCUMENTED: ICD-10-PCS | Mod: CPTII,S$GLB,, | Performed by: INTERNAL MEDICINE

## 2022-09-02 PROCEDURE — 99214 PR OFFICE/OUTPT VISIT, EST, LEVL IV, 30-39 MIN: ICD-10-PCS | Mod: S$GLB,,, | Performed by: INTERNAL MEDICINE

## 2022-09-02 PROCEDURE — 99214 OFFICE O/P EST MOD 30 MIN: CPT | Mod: S$GLB,,, | Performed by: INTERNAL MEDICINE

## 2022-09-02 PROCEDURE — 3079F PR MOST RECENT DIASTOLIC BLOOD PRESSURE 80-89 MM HG: ICD-10-PCS | Mod: CPTII,S$GLB,, | Performed by: INTERNAL MEDICINE

## 2022-09-02 PROCEDURE — 3008F BODY MASS INDEX DOCD: CPT | Mod: CPTII,S$GLB,, | Performed by: INTERNAL MEDICINE

## 2022-09-02 PROCEDURE — 3074F PR MOST RECENT SYSTOLIC BLOOD PRESSURE < 130 MM HG: ICD-10-PCS | Mod: CPTII,S$GLB,, | Performed by: INTERNAL MEDICINE

## 2022-09-02 PROCEDURE — 3008F PR BODY MASS INDEX (BMI) DOCUMENTED: ICD-10-PCS | Mod: CPTII,S$GLB,, | Performed by: INTERNAL MEDICINE

## 2022-09-02 PROCEDURE — 3079F DIAST BP 80-89 MM HG: CPT | Mod: CPTII,S$GLB,, | Performed by: INTERNAL MEDICINE

## 2022-09-02 PROCEDURE — 1159F PR MEDICATION LIST DOCUMENTED IN MEDICAL RECORD: ICD-10-PCS | Mod: CPTII,S$GLB,, | Performed by: INTERNAL MEDICINE

## 2022-09-02 PROCEDURE — 99999 PR PBB SHADOW E&M-EST. PATIENT-LVL IV: ICD-10-PCS | Mod: PBBFAC,,, | Performed by: INTERNAL MEDICINE

## 2022-09-02 PROCEDURE — 99999 PR PBB SHADOW E&M-EST. PATIENT-LVL IV: CPT | Mod: PBBFAC,,, | Performed by: INTERNAL MEDICINE

## 2022-09-02 NOTE — PROGRESS NOTES
Subjective:       Patient ID: Maria M Mei is a 62 y.o. female.    Chief Complaint: Results (6 months), Skin Tags, and Excessive Sweating      Skin Tags  Associated symptoms include diaphoresis (Tends to sweat easy). Pertinent negatives include no fever.   Excessive Sweating  Associated symptoms include diaphoresis (Tends to sweat easy). Pertinent negatives include no fever.   Maria M Mei is a 62 y.o. female with chronic conditions of  smoking, acute MI, and recent an embolic stroke, and found with PFO later having closure  who presents today for routine health maintenance.    Reports she feels okay.  Has been anxious when listening to the news.  Monitors her blood pressure at home and sometimes is elevated but thinks is anxiety.  Has some trouble sleeping but not when she sleeps in her bed.  She tends to sleep in her living room with the TV on but no volumes to help her sleep, but the light also keeps her awake.    She quit smoking but occasionally will smoke when other people are smoking.  She is trying to help her neighbor quit and his frequently exposed but not smoking.    She tries to watch her diet but tends to like cheese and snacks.  Not exercising as she used to and has been gaining weight.  Not exercising as much due to the heat.  She tends to sweat, drinks coffee and caffeinated drinks. Could drink more water.    Health Maintenance:  Health Maintenance   Topic Date Due    TETANUS VACCINE  08/02/2023 (Originally 9/30/2015)    Mammogram  08/22/2023    High Dose Statin  09/02/2023    Lipid Panel  08/26/2027    Hepatitis C Screening  Completed       Review of Systems   Constitutional:  Positive for diaphoresis (Tends to sweat easy). Negative for fever and unexpected weight change.   HENT: Negative.     Respiratory: Negative.     Cardiovascular: Negative.    Gastrointestinal: Negative.    Genitourinary:  Negative for difficulty urinating.   Musculoskeletal: Negative.    Integumentary:          Has noted some spider veins in her lower extremities, especially if she had a bruise. Negative.   Neurological: Negative.    Psychiatric/Behavioral:  Positive for sleep disturbance. The patient is nervous/anxious.     Past Medical History:   Diagnosis Date    COVID-19 12/2020    Hyperlipidemia     MI (myocardial infarction)     Stroke        Past Surgical History:   Procedure Laterality Date    ANKLE SURGERY      CHOLECYSTECTOMY         Family History   Problem Relation Age of Onset    Obesity Mother     Cancer Mother     Diabetes Mother     Hypertension Mother     Lung cancer Mother     Hypertension Father     Stroke Father     Heart disease Father     Obesity Sister     Hyperlipidemia Sister     Hypertension Sister     Arthritis Sister     Obesity Brother     Drug abuse Brother     Diabetes Brother     Heart disease Brother     Diabetes Daughter     Bipolar disorder Daughter     Obesity Daughter     No Known Problems Maternal Grandmother     No Known Problems Maternal Grandfather     No Known Problems Paternal Grandmother     No Known Problems Paternal Grandfather        Social History     Socioeconomic History    Marital status: Single   Tobacco Use    Smoking status: Every Day     Packs/day: 0.50     Years: 33.00     Pack years: 16.50     Types: Cigarettes     Start date: 1987    Smokeless tobacco: Never    Tobacco comments:     quit for 2 years in the middle; weaned self down, now 2/day   Substance and Sexual Activity    Alcohol use: Yes     Alcohol/week: 1.0 standard drink     Types: 1 Standard drinks or equivalent per week     Comment: Socially    Drug use: No    Sexual activity: Not Currently     Social Determinants of Health     Financial Resource Strain: Low Risk     Difficulty of Paying Living Expenses: Not hard at all   Food Insecurity: No Food Insecurity    Worried About Running Out of Food in the Last Year: Never true    Ran Out of Food in the Last Year: Never true   Transportation Needs: No  Transportation Needs    Lack of Transportation (Medical): No    Lack of Transportation (Non-Medical): No   Physical Activity: Insufficiently Active    Days of Exercise per Week: 2 days    Minutes of Exercise per Session: 30 min   Stress: No Stress Concern Present    Feeling of Stress : Only a little   Social Connections: Moderately Integrated    Frequency of Communication with Friends and Family: More than three times a week    Frequency of Social Gatherings with Friends and Family: More than three times a week    Attends Restorationist Services: More than 4 times per year    Active Member of Clubs or Organizations: Yes    Attends Club or Organization Meetings: More than 4 times per year    Marital Status:    Housing Stability: Low Risk     Unable to Pay for Housing in the Last Year: No    Number of Places Lived in the Last Year: 1    Unstable Housing in the Last Year: No       Current Outpatient Medications   Medication Sig Dispense Refill    albuterol (VENTOLIN HFA) 90 mcg/actuation inhaler Inhale 2 puffs into the lungs every 6 (six) hours as needed for Wheezing or Shortness of Breath.  Rescue 18 g 0    ascorbic acid, vitamin C, (VITAMIN C) 250 MG tablet Take 250 mg by mouth once daily.      aspirin 81 MG Chew Chew and swallow 1 tablet (81 mg total) by mouth once daily. 30 tablet 0    atorvastatin (LIPITOR) 40 MG tablet TAKE 1 TABLET EVERY DAY 90 tablet 2    buPROPion (WELLBUTRIN XL) 150 MG TB24 tablet Take 2 tablets (300 mg total) by mouth once daily. 60 tablet 11    cyclobenzaprine (FLEXERIL) 5 MG tablet TAKE 1 TABLET TWICE DAILY AS NEEDED FOR MUSCLE SPASMS. 30 tablet 2    diclofenac (VOLTAREN) 75 MG EC tablet TAKE 1 TABLET TWICE DAILY AS NEEDED FOR PAIN 60 tablet 1    hydrOXYzine pamoate (VISTARIL) 25 MG Cap TAKE 1 CAPSULE NIGHTLY AS NEEDED (SLEEP). 30 capsule 2    nicotine (NICODERM CQ) 21 mg/24 hr Place 1 patch onto the skin once daily. 28 patch 0    nicotine, polacrilex, (NICORETTE) 2 mg Gum Take 1 each  (2 mg total) by mouth as needed (as needed). 100 each 0    pantoprazole (PROTONIX) 40 MG tablet Take 1 tablet (40 mg total) by mouth once daily. 90 tablet 1    VENTOLIN HFA 90 mcg/actuation inhaler Inhale 2 puffs into the lungs every 6 (six) hours as needed for Wheezing. 18 g 5    zinc gluconate 50 mg tablet Take 50 mg by mouth once daily.       No current facility-administered medications for this visit.       Review of patient's allergies indicates:   Allergen Reactions    Codeine Itching         Objective:       Last 3 sets of Vitals    Vitals - 1 value per visit 8/2/2022 9/2/2022 9/2/2022   SYSTOLIC 110 - 118   DIASTOLIC 80 - 82   Pulse 84 - 70   Temp - - -   Resp - - -   SPO2 98 - 97   Weight (lb) 223.11 - 218.04   Weight (kg) 101.2 - 98.9   Height 67 - 67   BMI (Calculated) 34.9 - 34.1   VISIT REPORT - - -   Pain Score  - 0 -   Physical Exam  Constitutional:       General: She is not in acute distress.  HENT:      Head: Normocephalic.      Right Ear: Tympanic membrane, ear canal and external ear normal.      Left Ear: Tympanic membrane, ear canal and external ear normal.      Nose: Nose normal.      Mouth/Throat:      Mouth: Mucous membranes are moist.   Eyes:      General: No scleral icterus.     Extraocular Movements: Extraocular movements intact.      Conjunctiva/sclera: Conjunctivae normal.   Neck:      Vascular: No carotid bruit.      Comments: No goiter.  Cardiovascular:      Rate and Rhythm: Normal rate and regular rhythm.      Pulses: Normal pulses.      Heart sounds: Normal heart sounds.   Pulmonary:      Effort: Pulmonary effort is normal.      Breath sounds: Normal breath sounds.   Abdominal:      General: Bowel sounds are normal. There is no distension.      Palpations: Abdomen is soft. There is no mass.      Tenderness: There is no abdominal tenderness.   Musculoskeletal:         General: No swelling.   Lymphadenopathy:      Cervical: No cervical adenopathy.   Skin:     General: Skin is warm and  dry.   Neurological:      General: No focal deficit present.      Mental Status: She is alert and oriented to person, place, and time.   Psychiatric:         Mood and Affect: Mood normal.         Behavior: Behavior normal.         CBC:  Recent Labs   Lab 10/04/21  1057 10/11/21  0648 08/26/22  0815   WBC 7.16 8.34 8.09   RBC 4.64 4.28 4.40   Hemoglobin 14.5 13.3 13.5   Hematocrit 43.6 38.9 40.2   Platelets 303 284 316   MCV 94 91 91   MCH 31.3 H 31.1 H 30.7   MCHC 33.3 34.2 33.6     CMP:  Recent Labs   Lab 11/16/21  0705 02/28/22  0739 08/26/22  0815   Glucose 101 103 103   Calcium 9.7 9.3 9.3   Albumin 3.8 3.5 3.8   Total Protein 7.4 6.6 6.9   Sodium 140 142 143   Potassium 4.3 4.4 3.9   CO2 23 26 24   Chloride 108 111 H 108   BUN 20 16 20   Creatinine 1.1 1.1 0.9   Alkaline Phosphatase 116 89 114   ALT 16 19 30   AST 22 26 29   Total Bilirubin 0.4 0.4 0.5     URINALYSIS:  Recent Labs   Lab 05/23/21  1114   Color, UA Yellow   Specific Gravity, UA >=1.030 A   pH, UA 5.0   Protein, UA Negative   Bacteria Rare   Nitrite, UA Negative   Leukocytes, UA Negative      LIPIDS:  Recent Labs   Lab 05/22/21  2305 05/23/21  0535 06/26/21  0952 11/16/21  0705 02/28/22  0739 08/26/22  0815   TSH 2.917 3.703  --  2.282  --   --    HDL 31 L 25 L   < > 42 41 36 L   Cholesterol 239 H 216 H   < > 182 134 144   Triglycerides 365 H 304 H   < > 134 70 100   LDL Cholesterol 135.0 130.2   < > 113.2 79.0 88.0   HDL/Cholesterol Ratio 13.0 L 11.6 L   < > 23.1 30.6 25.0   Non-HDL Cholesterol 208 191   < > 140 93 108   Total Cholesterol/HDL Ratio 7.7 H 8.6 H   < > 4.3 3.3 4.0    < > = values in this interval not displayed.     TSH:  Recent Labs   Lab 05/22/21  2305 05/23/21  0535 11/16/21  0705   TSH 2.917 3.703 2.282       A1C:  Recent Labs   Lab 05/23/21  0535 06/26/21  0952 11/16/21  0705 02/28/22  0739 08/26/22  0815   Hemoglobin A1C 5.4 5.6 5.7 H 5.6 5.8 H       Imaging:  Mammo Digital Screening Bilat w/ Zac  Narrative: Result:  Mammo  Digital Screening Bilat w/ Zac    History:  Patient is 62 y.o. and is seen for a screening mammogram.    Films Compared:  Compared to: 05/25/2021 Mammo Previous     Findings:   This procedure was performed using tomosynthesis.   Computer-aided detection was utilized in the interpretation of this   examination.    The breasts are almost entirely fatty. There is no evidence of suspicious   masses, microcalcifications or architectural distortion.  Impression:    No mammographic evidence of malignancy.    BI-RADS Category 1: Negative    Recommendation:  Routine screening mammogram in 1 year is recommended.    Your estimated lifetime risk of breast cancer (to age 85) based on   Tyrer-Cuzick risk assessment model is 3.45 %.  According to the American   Cancer Society, patients with a lifetime breast cancer risk of 20% or   higher might benefit from supplemental screening tests. ??       Assessment:       1. Prediabetes    2. Mixed hyperlipidemia    3. Anxiety    4. Embolic stroke involving left middle cerebral artery    5. Class 1 obesity with body mass index (BMI) of 34.0 to 34.9 in adult, unspecified obesity type, unspecified whether serious comorbidity present              Plan:       Maria M was seen today for results, skin tags and excessive sweating.    Diagnoses and all orders for this visit:    Prediabetes  -     Comprehensive Metabolic Panel; Future  -     Hemoglobin A1C; Future  - Lifestyle modification with exercise, weight monitoring, and diet.  Advised to plan meals, journal, and have others join plan.  Decrease alcohol as possible and avoid smoking.    Mixed hyperlipidemia  -     Lipid Panel; Future    Anxiety  -     TSH; Future  - consider therapy, or adding low-dose Celexa.  The patient will try to modify habits before trying medications.  - probably associated to hypertension.  Will consider medication if persistent.    Embolic stroke involving left middle cerebral artery  -     Comprehensive Metabolic  Panel; Future  -     Lipid Panel; Future  - neurological is stable.  Had PFO closure.    Class 1 obesity with body mass index (BMI) of 34.0 to 34.9 in adult, unspecified obesity type, unspecified whether serious comorbidity present  -     Hemoglobin A1C; Future  -     Lipid Panel; Future  -     TSH; Future  - Lifestyle modification with exercise, weight monitoring, and diet.  Advised to plan meals, journal, and have others join plan.  Decrease alcohol as possible and avoid smoking.    Health Maintenance Due   Topic Date Due    Influenza Vaccine (1) 09/01/2022        Return to clinic in 3 months.    Emelia Jones MD  [facility]  Disclaimer:  This note has been generated using voice-recognition software. There may be grammatical or spelling errors that have been missed during proof-reading

## 2022-10-14 ENCOUNTER — CLINICAL SUPPORT (OUTPATIENT)
Dept: SMOKING CESSATION | Facility: CLINIC | Age: 62
End: 2022-10-14
Payer: COMMERCIAL

## 2022-10-14 DIAGNOSIS — F17.200 NICOTINE DEPENDENCE: Primary | ICD-10-CM

## 2022-10-14 PROCEDURE — 99999 PR PBB SHADOW E&M-EST. PATIENT-LVL I: CPT | Mod: PBBFAC,,,

## 2022-10-14 PROCEDURE — 99999 PR PBB SHADOW E&M-EST. PATIENT-LVL I: ICD-10-PCS | Mod: PBBFAC,,,

## 2022-10-14 PROCEDURE — 99407 PR TOBACCO USE CESSATION INTENSIVE >10 MINUTES: ICD-10-PCS | Mod: S$GLB,,,

## 2022-10-14 PROCEDURE — 99407 BEHAV CHNG SMOKING > 10 MIN: CPT | Mod: S$GLB,,,

## 2022-10-14 NOTE — PROGRESS NOTES
Spoke with patient today in regard to smoking cessation progress for 12-month telephone follow up. Patient states that she is tobacco free. Commended patient on their quit.  Patient states she still will use NRT when socializing with smokers, but is proud that she and her daughter were able to quit. Informed patient of benefit period, future follow up, and contact information if any further help or support is needed. Will complete smart form for 12 month follow up and resolve Quit attempt #2.

## 2022-10-28 ENCOUNTER — PATIENT MESSAGE (OUTPATIENT)
Dept: FAMILY MEDICINE | Facility: CLINIC | Age: 62
End: 2022-10-28
Payer: MEDICARE

## 2022-12-01 RX ORDER — PANTOPRAZOLE SODIUM 40 MG/1
TABLET, DELAYED RELEASE ORAL
Qty: 90 TABLET | Refills: 3 | Status: SHIPPED | OUTPATIENT
Start: 2022-12-01 | End: 2023-11-29

## 2022-12-01 NOTE — TELEPHONE ENCOUNTER
No new care gaps identified.  [facility] Embedded Care Gaps. Reference number: [id number]. 12/01/2022   3:52:54 PM CST

## 2022-12-02 NOTE — TELEPHONE ENCOUNTER
Refill Decision Note   Maria M Sigifredo  is requesting a refill authorization.  Brief Assessment and Rationale for Refill:  Approve     Medication Therapy Plan:       Medication Reconciliation Completed: No   Comments:     No Care Gaps recommended.     Note composed:9:26 PM 12/01/2022

## 2023-02-07 DIAGNOSIS — Z00.00 ENCOUNTER FOR MEDICARE ANNUAL WELLNESS EXAM: ICD-10-CM

## 2023-02-08 ENCOUNTER — OFFICE VISIT (OUTPATIENT)
Dept: FAMILY MEDICINE | Facility: CLINIC | Age: 63
End: 2023-02-08
Payer: MEDICARE

## 2023-02-08 VITALS
HEART RATE: 87 BPM | TEMPERATURE: 98 F | SYSTOLIC BLOOD PRESSURE: 106 MMHG | OXYGEN SATURATION: 97 % | BODY MASS INDEX: 33.12 KG/M2 | HEIGHT: 67 IN | DIASTOLIC BLOOD PRESSURE: 68 MMHG | WEIGHT: 211 LBS

## 2023-02-08 DIAGNOSIS — R73.09 OTHER ABNORMAL GLUCOSE: ICD-10-CM

## 2023-02-08 DIAGNOSIS — R73.03 PREDIABETES: ICD-10-CM

## 2023-02-08 DIAGNOSIS — Z87.891 PERSONAL HISTORY OF TOBACCO USE: ICD-10-CM

## 2023-02-08 DIAGNOSIS — F41.9 ANXIETY: ICD-10-CM

## 2023-02-08 DIAGNOSIS — E78.2 MIXED HYPERLIPIDEMIA: Primary | ICD-10-CM

## 2023-02-08 DIAGNOSIS — Z86.73 HISTORY OF CVA (CEREBROVASCULAR ACCIDENT): ICD-10-CM

## 2023-02-08 DIAGNOSIS — G45.9 TIA (TRANSIENT ISCHEMIC ATTACK): ICD-10-CM

## 2023-02-08 PROCEDURE — 1159F MED LIST DOCD IN RCRD: CPT | Mod: HCNC,CPTII,S$GLB, | Performed by: INTERNAL MEDICINE

## 2023-02-08 PROCEDURE — 3078F PR MOST RECENT DIASTOLIC BLOOD PRESSURE < 80 MM HG: ICD-10-PCS | Mod: HCNC,CPTII,S$GLB, | Performed by: INTERNAL MEDICINE

## 2023-02-08 PROCEDURE — 3008F PR BODY MASS INDEX (BMI) DOCUMENTED: ICD-10-PCS | Mod: HCNC,CPTII,S$GLB, | Performed by: INTERNAL MEDICINE

## 2023-02-08 PROCEDURE — 99999 PR PBB SHADOW E&M-EST. PATIENT-LVL IV: CPT | Mod: PBBFAC,HCNC,, | Performed by: INTERNAL MEDICINE

## 2023-02-08 PROCEDURE — 3008F BODY MASS INDEX DOCD: CPT | Mod: HCNC,CPTII,S$GLB, | Performed by: INTERNAL MEDICINE

## 2023-02-08 PROCEDURE — 99999 PR PBB SHADOW E&M-EST. PATIENT-LVL IV: ICD-10-PCS | Mod: PBBFAC,HCNC,, | Performed by: INTERNAL MEDICINE

## 2023-02-08 PROCEDURE — 3074F SYST BP LT 130 MM HG: CPT | Mod: HCNC,CPTII,S$GLB, | Performed by: INTERNAL MEDICINE

## 2023-02-08 PROCEDURE — 1160F PR REVIEW ALL MEDS BY PRESCRIBER/CLIN PHARMACIST DOCUMENTED: ICD-10-PCS | Mod: HCNC,CPTII,S$GLB, | Performed by: INTERNAL MEDICINE

## 2023-02-08 PROCEDURE — 3078F DIAST BP <80 MM HG: CPT | Mod: HCNC,CPTII,S$GLB, | Performed by: INTERNAL MEDICINE

## 2023-02-08 PROCEDURE — 99214 PR OFFICE/OUTPT VISIT, EST, LEVL IV, 30-39 MIN: ICD-10-PCS | Mod: HCNC,S$GLB,, | Performed by: INTERNAL MEDICINE

## 2023-02-08 PROCEDURE — 1160F RVW MEDS BY RX/DR IN RCRD: CPT | Mod: HCNC,CPTII,S$GLB, | Performed by: INTERNAL MEDICINE

## 2023-02-08 PROCEDURE — 3074F PR MOST RECENT SYSTOLIC BLOOD PRESSURE < 130 MM HG: ICD-10-PCS | Mod: HCNC,CPTII,S$GLB, | Performed by: INTERNAL MEDICINE

## 2023-02-08 PROCEDURE — 1159F PR MEDICATION LIST DOCUMENTED IN MEDICAL RECORD: ICD-10-PCS | Mod: HCNC,CPTII,S$GLB, | Performed by: INTERNAL MEDICINE

## 2023-02-08 PROCEDURE — 99214 OFFICE O/P EST MOD 30 MIN: CPT | Mod: HCNC,S$GLB,, | Performed by: INTERNAL MEDICINE

## 2023-02-08 RX ORDER — GABAPENTIN 100 MG/1
100 CAPSULE ORAL NIGHTLY
Qty: 30 CAPSULE | Refills: 11 | Status: SHIPPED | OUTPATIENT
Start: 2023-02-08 | End: 2023-08-09 | Stop reason: SDUPTHER

## 2023-02-08 NOTE — PROGRESS NOTES
Subjective:       Patient ID: Maria M Mei is a 62 y.o. female.    Chief Complaint: Follow-up (3 month)      Follow-up  Associated symptoms include arthralgias and diaphoresis. Pertinent negatives include no fever.   Maria M Mei is a 62 y.o. female with chronic conditions of acute MI, and recent an embolic stroke, smoking, and found with PFO later having closure  who presents today for routine follow-up.    Reports lately has been feeling flat or down.  Not as motivated and wants to stay home most of the time.  Some stress with watching TV.  Still goes out to store, [employer], another around.  She is on Wellbutrin to help with smoking cessation.     Complains of episodes of sweats or hot flashes.  Does not drink significant amount of caffeine.  Possibly started after Wellbutrin.    Has been watching her diet and lost some weight.  Not as active as she used to be.  Sometimes limited with back and joint pains.    Health Maintenance:  Health Maintenance   Topic Date Due    Aspirin/Antiplatelet Therapy  Never done    TETANUS VACCINE  08/02/2023 (Originally 9/30/2015)    Mammogram  08/22/2023    High Dose Statin  02/08/2024    Lipid Panel  08/26/2027    Hepatitis C Screening  Completed       Review of Systems   Constitutional:  Positive for diaphoresis. Negative for fever and unexpected weight change.   HENT: Negative.     Respiratory: Negative.     Cardiovascular: Negative.    Gastrointestinal: Negative.    Genitourinary:  Positive for hot flashes. Negative for difficulty urinating.   Musculoskeletal:  Positive for arthralgias.   Integumentary:  Negative.   Neurological: Negative.    Psychiatric/Behavioral:  Positive for dysphoric mood and sleep disturbance. The patient is nervous/anxious.     Past Medical History:   Diagnosis Date    COVID-19 12/2020    Hyperlipidemia     MI (myocardial infarction)     Stroke        Past Surgical History:   Procedure Laterality Date    ANKLE SURGERY      CHOLECYSTECTOMY          Family History   Problem Relation Age of Onset    Obesity Mother     Cancer Mother     Diabetes Mother     Hypertension Mother     Lung cancer Mother     Hypertension Father     Stroke Father     Heart disease Father     Obesity Sister     Hyperlipidemia Sister     Hypertension Sister     Arthritis Sister     Obesity Brother     Drug abuse Brother     Diabetes Brother     Heart disease Brother     Diabetes Daughter     Bipolar disorder Daughter     Obesity Daughter     No Known Problems Maternal Grandmother     No Known Problems Maternal Grandfather     No Known Problems Paternal Grandmother     No Known Problems Paternal Grandfather        Social History     Socioeconomic History    Marital status: Single   Tobacco Use    Smoking status: Former     Packs/day: 0.50     Years: 33.00     Pack years: 16.50     Types: Cigarettes     Start date: 1987    Smokeless tobacco: Never    Tobacco comments:     quit for 2 years in the middle; weaned self down, now 2/day   Substance and Sexual Activity    Alcohol use: Yes     Alcohol/week: 1.0 standard drink     Types: 1 Standard drinks or equivalent per week     Comment: Socially    Drug use: No    Sexual activity: Not Currently     Social Determinants of Health     Financial Resource Strain: Low Risk     Difficulty of Paying Living Expenses: Not hard at all   Food Insecurity: No Food Insecurity    Worried About Running Out of Food in the Last Year: Never true    Ran Out of Food in the Last Year: Never true   Transportation Needs: No Transportation Needs    Lack of Transportation (Medical): No    Lack of Transportation (Non-Medical): No   Physical Activity: Insufficiently Active    Days of Exercise per Week: 2 days    Minutes of Exercise per Session: 30 min   Stress: No Stress Concern Present    Feeling of Stress : Only a little   Social Connections: Moderately Integrated    Frequency of Communication with Friends and Family: More than three times a week    Frequency of  Social Gatherings with Friends and Family: More than three times a week    Attends [employer] Services: More than 4 times per year    Active Member of Clubs or Organizations: Yes    Attends Club or Organization Meetings: More than 4 times per year    Marital Status:    Housing Stability: Low Risk     Unable to Pay for Housing in the Last Year: No    Number of Places Lived in the Last Year: 1    Unstable Housing in the Last Year: No       Current Outpatient Medications   Medication Sig Dispense Refill    albuterol (VENTOLIN HFA) 90 mcg/actuation inhaler Inhale 2 puffs into the lungs every 6 (six) hours as needed for Wheezing or Shortness of Breath.  Rescue 18 g 0    ascorbic acid, vitamin C, (VITAMIN C) 250 MG tablet Take 250 mg by mouth once daily.      aspirin 81 MG Chew Chew and swallow 1 tablet (81 mg total) by mouth once daily. 30 tablet 0    atorvastatin (LIPITOR) 40 MG tablet TAKE 1 TABLET EVERY DAY 90 tablet 2    buPROPion (WELLBUTRIN XL) 150 MG TB24 tablet Take 2 tablets (300 mg total) by mouth once daily. 180 tablet 3    cyclobenzaprine (FLEXERIL) 5 MG tablet TAKE 1 TABLET (5 MG TOTAL) BY MOUTH 2 (TWO) TIMES DAILY AS NEEDED. 30 tablet 2    hydrOXYzine pamoate (VISTARIL) 25 MG Cap TAKE 1 CAPSULE NIGHTLY AS NEEDED (SLEEP). 30 capsule 2    nicotine (NICODERM CQ) 21 mg/24 hr Place 1 patch onto the skin once daily. 28 patch 0    nicotine, polacrilex, (NICORETTE) 2 mg Gum Take 1 each (2 mg total) by mouth as needed (as needed). 100 each 0    pantoprazole (PROTONIX) 40 MG tablet TAKE 1 TABLET ONE TIME DAILY 90 tablet 3    zinc gluconate 50 mg tablet Take 50 mg by mouth once daily.      diclofenac (VOLTAREN) 75 MG EC tablet TAKE 1 TABLET (75 MG TOTAL) BY MOUTH 2 (TWO) TIMES DAILY AS NEEDED. 60 tablet 1    gabapentin (NEURONTIN) 100 MG capsule Take 1 capsule (100 mg total) by mouth every evening. 30 capsule 11     No current facility-administered medications for this visit.       Review of patient's allergies  indicates:   Allergen Reactions    Codeine Itching         Objective:       Last 3 sets of Vitals    Vitals - 1 value per visit 9/2/2022 2/8/2023 2/8/2023   SYSTOLIC 118 - 106   DIASTOLIC 82 - 68   Pulse 70 - 87   Temp - - 98.3   Resp - - -   SPO2 97 - 97   Weight (lb) 218.04 - 210.98   Weight (kg) 98.9 - 95.7   Height 67 - 67   BMI (Calculated) 34.1 - 33   VISIT REPORT - - -   Pain Score  - 4 -   Physical Exam  Constitutional:       General: She is not in acute distress.  HENT:      Head: Normocephalic.      Right Ear: Tympanic membrane, ear canal and external ear normal.      Left Ear: Tympanic membrane, ear canal and external ear normal.      Nose: Nose normal.      Mouth/Throat:      Mouth: Mucous membranes are moist.   Eyes:      General: No scleral icterus.     Extraocular Movements: Extraocular movements intact.      Conjunctiva/sclera: Conjunctivae normal.   Neck:      Vascular: No carotid bruit.      Comments: No goiter.  Cardiovascular:      Rate and Rhythm: Normal rate and regular rhythm.      Pulses: Normal pulses.      Heart sounds: Normal heart sounds.   Pulmonary:      Effort: Pulmonary effort is normal.      Breath sounds: Normal breath sounds.   Abdominal:      General: Bowel sounds are normal. There is no distension.      Palpations: Abdomen is soft. There is no mass.      Tenderness: There is no abdominal tenderness.   Musculoskeletal:         General: No swelling.   Lymphadenopathy:      Cervical: No cervical adenopathy.   Skin:     General: Skin is warm and dry.   Neurological:      General: No focal deficit present.      Mental Status: She is alert and oriented to person, place, and time.   Psychiatric:         Mood and Affect: Mood normal.         Behavior: Behavior normal.         CBC:  Recent Labs   Lab 10/04/21  1057 10/11/21  0648 08/26/22  0815   WBC 7.16 8.34 8.09   RBC 4.64 4.28 4.40   Hemoglobin 14.5 13.3 13.5   Hematocrit 43.6 38.9 40.2   Platelets 303 284 316   MCV 94 91 91   MCH  31.3 H 31.1 H 30.7   MCHC 33.3 34.2 33.6     CMP:  Recent Labs   Lab 11/16/21  0705 02/28/22  0739 08/26/22  0815   Glucose 101 103 103   Calcium 9.7 9.3 9.3   Albumin 3.8 3.5 3.8   Total Protein 7.4 6.6 6.9   Sodium 140 142 143   Potassium 4.3 4.4 3.9   CO2 23 26 24   Chloride 108 111 H 108   BUN 20 16 20   Creatinine 1.1 1.1 0.9   Alkaline Phosphatase 116 89 114   ALT 16 19 30   AST 22 26 29   Total Bilirubin 0.4 0.4 0.5     URINALYSIS:  Recent Labs   Lab 05/23/21  1114   Color, UA Yellow   Specific Gravity, UA >=1.030 A   pH, UA 5.0   Protein, UA Negative   Bacteria Rare   Nitrite, UA Negative   Leukocytes, UA Negative      LIPIDS:  Recent Labs   Lab 05/22/21 2305 05/23/21  0535 06/26/21  0952 11/16/21  0705 02/28/22  0739 08/26/22  0815   TSH 2.917 3.703  --  2.282  --   --    HDL 31 L 25 L   < > 42 41 36 L   Cholesterol 239 H 216 H   < > 182 134 144   Triglycerides 365 H 304 H   < > 134 70 100   LDL Cholesterol 135.0 130.2   < > 113.2 79.0 88.0   HDL/Cholesterol Ratio 13.0 L 11.6 L   < > 23.1 30.6 25.0   Non-HDL Cholesterol 208 191   < > 140 93 108   Total Cholesterol/HDL Ratio 7.7 H 8.6 H   < > 4.3 3.3 4.0    < > = values in this interval not displayed.     TSH:  Recent Labs   Lab 05/22/21 2305 05/23/21  0535 11/16/21  0705   TSH 2.917 3.703 2.282       A1C:  Recent Labs   Lab 05/23/21  0535 06/26/21  0952 11/16/21  0705 02/28/22  0739 08/26/22  0815   Hemoglobin A1C 5.4 5.6 5.7 H 5.6 5.8 H       Imaging:  Mammo Digital Screening Bilat w/ Zac  Narrative: Result:  Mammo Digital Screening Bilat w/ Zac    History:  Patient is 62 y.o. and is seen for a screening mammogram.    Films Compared:  Compared to: 05/25/2021 Mammo Previous     Findings:   This procedure was performed using tomosynthesis.   Computer-aided detection was utilized in the interpretation of this   examination.    The breasts are almost entirely fatty. There is no evidence of suspicious   masses, microcalcifications or architectural  distortion.  Impression:    No mammographic evidence of malignancy.    BI-RADS Category 1: Negative    Recommendation:  Routine screening mammogram in 1 year is recommended.    Your estimated lifetime risk of breast cancer (to age 85) based on   Tyrer-Cuzick risk assessment model is 3.45 %.  According to the American   Cancer Society, patients with a lifetime breast cancer risk of 20% or   higher might benefit from supplemental screening tests. ??       Assessment:       1. Mixed hyperlipidemia    2. Prediabetes    3. TIA (transient ischemic attack)    4. History of CVA (cerebrovascular accident)    5. Personal history of tobacco use    6. Anxiety    7. Other abnormal glucose              Plan:       1. Mixed hyperlipidemia  -     Lipid Panel; Future; Expected date: 02/08/2023  - on atorvastatin    2. Prediabetes  -     Comprehensive Metabolic Panel; Future; Expected date: 02/08/2023  -     Hemoglobin A1C; Future; Expected date: 02/08/2023  - continue lifestyle modifications.    3. TIA (transient ischemic attack)  -     Comprehensive Metabolic Panel; Future; Expected date: 02/08/2023  -     TSH; Future; Expected date: 02/08/2023  - on aspirin.  Working on smoking cessation.    4. History of CVA (cerebrovascular accident)   - on aspirin, atorvastatin   - status post PFO repair    5. Personal history of tobacco use   - smoking cessation program    6. Anxiety  -     CBC Auto Differential; Future; Expected date: 02/08/2023  - on Wellbutrin.    7. Other abnormal glucose  -     CBC Auto Differential; Future; Expected date: 02/08/2023    Other orders  -     gabapentin (NEURONTIN) 100 MG capsule; Take 1 capsule (100 mg total) by mouth every evening.  Dispense: 30 capsule; Refill: 11- will help leg and back discomfort at a time, may help hot flashes, may help her sleep.  Starting a low-dose and will consider increasing to 200 mg.       Health Maintenance Due   Topic Date Due    Aspirin/Antiplatelet Therapy  Never done     Influenza Vaccine (1) 09/01/2022            Return to clinic in 6 months.    Emelia Jones MD  [facility]  Disclaimer:  This note has been generated using voice-recognition software. There may be grammatical or spelling errors that have been missed during proof-reading

## 2023-02-09 DIAGNOSIS — Z00.00 ENCOUNTER FOR MEDICARE ANNUAL WELLNESS EXAM: ICD-10-CM

## 2023-02-10 ENCOUNTER — LAB VISIT (OUTPATIENT)
Dept: LAB | Facility: HOSPITAL | Age: 63
End: 2023-02-10
Attending: INTERNAL MEDICINE
Payer: MEDICARE

## 2023-02-10 DIAGNOSIS — R73.09 OTHER ABNORMAL GLUCOSE: ICD-10-CM

## 2023-02-10 DIAGNOSIS — E78.2 MIXED HYPERLIPIDEMIA: ICD-10-CM

## 2023-02-10 DIAGNOSIS — F41.9 ANXIETY: ICD-10-CM

## 2023-02-10 DIAGNOSIS — R73.03 PREDIABETES: ICD-10-CM

## 2023-02-10 DIAGNOSIS — G45.9 TIA (TRANSIENT ISCHEMIC ATTACK): ICD-10-CM

## 2023-02-10 LAB
ALBUMIN SERPL BCP-MCNC: 3.9 G/DL (ref 3.5–5.2)
ALP SERPL-CCNC: 112 U/L (ref 55–135)
ALT SERPL W/O P-5'-P-CCNC: 29 U/L (ref 10–44)
ANION GAP SERPL CALC-SCNC: 8 MMOL/L (ref 8–16)
AST SERPL-CCNC: 23 U/L (ref 10–40)
BASOPHILS # BLD AUTO: 0.06 K/UL (ref 0–0.2)
BASOPHILS NFR BLD: 0.8 % (ref 0–1.9)
BILIRUB SERPL-MCNC: 0.3 MG/DL (ref 0.1–1)
BUN SERPL-MCNC: 25 MG/DL (ref 8–23)
CALCIUM SERPL-MCNC: 9.6 MG/DL (ref 8.7–10.5)
CHLORIDE SERPL-SCNC: 107 MMOL/L (ref 95–110)
CHOLEST SERPL-MCNC: 155 MG/DL (ref 120–199)
CHOLEST/HDLC SERPL: 4.7 {RATIO} (ref 2–5)
CO2 SERPL-SCNC: 26 MMOL/L (ref 23–29)
CREAT SERPL-MCNC: 1.1 MG/DL (ref 0.5–1.4)
DIFFERENTIAL METHOD: NORMAL
EOSINOPHIL # BLD AUTO: 0.3 K/UL (ref 0–0.5)
EOSINOPHIL NFR BLD: 3.9 % (ref 0–8)
ERYTHROCYTE [DISTWIDTH] IN BLOOD BY AUTOMATED COUNT: 13.8 % (ref 11.5–14.5)
EST. GFR  (NO RACE VARIABLE): 57 ML/MIN/1.73 M^2
ESTIMATED AVG GLUCOSE: 120 MG/DL (ref 68–131)
GLUCOSE SERPL-MCNC: 100 MG/DL (ref 70–110)
HBA1C MFR BLD: 5.8 % (ref 4–5.6)
HCT VFR BLD AUTO: 43.2 % (ref 37–48.5)
HDLC SERPL-MCNC: 33 MG/DL (ref 40–75)
HDLC SERPL: 21.3 % (ref 20–50)
HGB BLD-MCNC: 14.1 G/DL (ref 12–16)
IMM GRANULOCYTES # BLD AUTO: 0.02 K/UL (ref 0–0.04)
IMM GRANULOCYTES NFR BLD AUTO: 0.3 % (ref 0–0.5)
LDLC SERPL CALC-MCNC: 103.6 MG/DL (ref 63–159)
LYMPHOCYTES # BLD AUTO: 2.6 K/UL (ref 1–4.8)
LYMPHOCYTES NFR BLD: 35.2 % (ref 18–48)
MCH RBC QN AUTO: 29.9 PG (ref 27–31)
MCHC RBC AUTO-ENTMCNC: 32.6 G/DL (ref 32–36)
MCV RBC AUTO: 92 FL (ref 82–98)
MONOCYTES # BLD AUTO: 0.5 K/UL (ref 0.3–1)
MONOCYTES NFR BLD: 7.1 % (ref 4–15)
NEUTROPHILS # BLD AUTO: 3.9 K/UL (ref 1.8–7.7)
NEUTROPHILS NFR BLD: 52.7 % (ref 38–73)
NONHDLC SERPL-MCNC: 122 MG/DL
NRBC BLD-RTO: 0 /100 WBC
PLATELET # BLD AUTO: 287 K/UL (ref 150–450)
PMV BLD AUTO: 9.2 FL (ref 9.2–12.9)
POTASSIUM SERPL-SCNC: 4.5 MMOL/L (ref 3.5–5.1)
PROT SERPL-MCNC: 7.2 G/DL (ref 6–8.4)
RBC # BLD AUTO: 4.72 M/UL (ref 4–5.4)
SODIUM SERPL-SCNC: 141 MMOL/L (ref 136–145)
TRIGL SERPL-MCNC: 92 MG/DL (ref 30–150)
TSH SERPL DL<=0.005 MIU/L-ACNC: 2.05 UIU/ML (ref 0.4–4)
WBC # BLD AUTO: 7.35 K/UL (ref 3.9–12.7)

## 2023-02-10 PROCEDURE — 84443 ASSAY THYROID STIM HORMONE: CPT | Mod: HCNC | Performed by: INTERNAL MEDICINE

## 2023-02-10 PROCEDURE — 36415 COLL VENOUS BLD VENIPUNCTURE: CPT | Mod: HCNC | Performed by: INTERNAL MEDICINE

## 2023-02-10 PROCEDURE — 83036 HEMOGLOBIN GLYCOSYLATED A1C: CPT | Mod: HCNC | Performed by: INTERNAL MEDICINE

## 2023-02-10 PROCEDURE — 80053 COMPREHEN METABOLIC PANEL: CPT | Mod: HCNC | Performed by: INTERNAL MEDICINE

## 2023-02-10 PROCEDURE — 85025 COMPLETE CBC W/AUTO DIFF WBC: CPT | Mod: HCNC | Performed by: INTERNAL MEDICINE

## 2023-02-10 PROCEDURE — 80061 LIPID PANEL: CPT | Mod: HCNC | Performed by: INTERNAL MEDICINE

## 2023-02-12 ENCOUNTER — PATIENT MESSAGE (OUTPATIENT)
Dept: FAMILY MEDICINE | Facility: CLINIC | Age: 63
End: 2023-02-12
Payer: MEDICARE

## 2023-02-28 ENCOUNTER — HOSPITAL ENCOUNTER (EMERGENCY)
Facility: HOSPITAL | Age: 63
Discharge: HOME | End: 2023-03-01
Attending: EMERGENCY MEDICINE
Payer: MEDICARE

## 2023-02-28 DIAGNOSIS — S99.919A ANKLE INJURY: ICD-10-CM

## 2023-02-28 DIAGNOSIS — S86.012A RUPTURE OF LEFT ACHILLES TENDON, INITIAL ENCOUNTER: Primary | ICD-10-CM

## 2023-02-28 PROCEDURE — 29515 APPLICATION SHORT LEG SPLINT: CPT | Mod: HCNC,LT

## 2023-02-28 PROCEDURE — 99283 EMERGENCY DEPT VISIT LOW MDM: CPT | Mod: 25,HCNC

## 2023-03-01 VITALS
TEMPERATURE: 98 F | WEIGHT: 210 LBS | BODY MASS INDEX: 32.89 KG/M2 | RESPIRATION RATE: 20 BRPM | DIASTOLIC BLOOD PRESSURE: 78 MMHG | HEART RATE: 74 BPM | SYSTOLIC BLOOD PRESSURE: 181 MMHG | OXYGEN SATURATION: 96 %

## 2023-03-01 PROCEDURE — 25000003 PHARM REV CODE 250: Mod: HCNC | Performed by: EMERGENCY MEDICINE

## 2023-03-01 PROCEDURE — 29515 APPLICATION SHORT LEG SPLINT: CPT | Mod: HCNC,LT

## 2023-03-01 RX ORDER — HYDROCODONE BITARTRATE AND ACETAMINOPHEN 5; 325 MG/1; MG/1
1 TABLET ORAL EVERY 6 HOURS PRN
Qty: 12 TABLET | Refills: 0 | Status: SHIPPED | OUTPATIENT
Start: 2023-03-01 | End: 2023-03-04

## 2023-03-01 RX ORDER — HYDROCODONE BITARTRATE AND ACETAMINOPHEN 5; 325 MG/1; MG/1
1 TABLET ORAL
Status: COMPLETED | OUTPATIENT
Start: 2023-03-01 | End: 2023-03-01

## 2023-03-01 RX ADMIN — HYDROCODONE BITARTRATE AND ACETAMINOPHEN 1 TABLET: 5; 325 TABLET ORAL at 12:03

## 2023-03-01 NOTE — DISCHARGE INSTRUCTIONS
Keep splint clean/dry.  You make take Tylenol/ibuprofen for mild pain; oxycodone for severe pain.  Call [facility] to obtain follow-up appointment.   Return to ED for worsening pain, if splint gets wet, or for any other concerns.    Thank you for choosing [facility]!     Our goal in the Emergency Department is to always provide outstanding medical care. You may receive a survey by mail or e-mail in the next week regarding your experience today. We would greatly appreciate you completing and returning the survey. Your feedback provides us with a way to recognize our staff who provide very good care, and it helps us learn how to improve when your experience was below our aspiration of excellence.      It is important to remember that some problems are difficult to diagnose and may not be found during your first visit. Be sure to follow up with your primary care doctor and review any labs/imaging that was performed during your visit with them. If you do not have a primary care doctor, you may contact the one listed on your discharge paperwork, or you may also call the [facility] Appointment Desk at [phone] to schedule an appointment.     All medications may potentially have side effects and it is impossible to predict which medications may give you side effects. If you feel that you are having a negative effect of any medication you should immediately stop taking them and seek medical attention.  Do not drive or make any important decisions for 24 hours if you have received any pain medications, sedatives or mood altering drugs during your ER visit.    We appreciate you trusting us with your medical care. We will be happy to take care of you for all of your future medical needs. You may return to the ER at any time for any new/concerning symptoms, worsening condition, or failure to improve. We hope you feel better soon.     Sincerely,    Jon Dennis Jr., MD  Board-Certified Emergency  Medicine Physician  [facility]

## 2023-03-01 NOTE — ED NOTES
Patient very loud in the ED yelling that she wants to be discharged.  Patient states she hasn't been offered ice or a a pillow since arrival.  Patient provided both at this time.  Patient refused both at this time.  Patient continues to yell stating she has been here since 1930 and staff haven't given her a pillow or ice.  Patient states she wants discharge now.  Refusing splint.  MD notified of current requests and ongoing pain.

## 2023-03-01 NOTE — ED PROVIDER NOTES
Emergency Department Encounter  Provider Note    Maria M Mei  [id number]  2/28/2023    Evaluation:    History:     Chief Complaint   Patient presents with    ankle injury     States she missed one step when going outside this evening. Injured back of left ankle. Felt a pop. Reports burning sensation. Pain radiates up posterior leg. Patient can flex at the ankle in all directions. States unable to bear weight.      Maria M Mei is a 62 y.o. female who has a past medical history of COVID-19 (12/2020), Hyperlipidemia, MI (myocardial infarction), and Stroke.    The patient presents to the ED due to L ankle pain.   Patient states she was walking down the steps of her porch when she felt a pop behind her ankle. She denies any associated extremity pain from the fall. She reports severe pain when trying to move her ankle up and down.   She denies any prior injury or surgery to the area. She took OTC medication prior to arrival without improvement.       Past Medical History:   Diagnosis Date    COVID-19 12/2020    Hyperlipidemia     MI (myocardial infarction)     Stroke      Past Surgical History:   Procedure Laterality Date    ANKLE SURGERY      CHOLECYSTECTOMY       Family History   Problem Relation Age of Onset    Obesity Mother     Cancer Mother     Diabetes Mother     Hypertension Mother     Lung cancer Mother     Hypertension Father     Stroke Father     Heart disease Father     Obesity Sister     Hyperlipidemia Sister     Hypertension Sister     Arthritis Sister     Obesity Brother     Drug abuse Brother     Diabetes Brother     Heart disease Brother     Diabetes Daughter     Bipolar disorder Daughter     Obesity Daughter     No Known Problems Maternal Grandmother     No Known Problems Maternal Grandfather     No Known Problems Paternal Grandmother     No Known Problems Paternal Grandfather      Social History     Socioeconomic History    Marital status: Single   Tobacco Use    Smoking status: Former      Packs/day: 0.50     Years: 33.00     Pack years: 16.50     Types: Cigarettes     Start date: 1987    Smokeless tobacco: Never    Tobacco comments:     quit for 2 years in the middle; weaned self down, now 2/day   Substance and Sexual Activity    Alcohol use: Yes     Alcohol/week: 1.0 standard drink     Types: 1 Standard drinks or equivalent per week     Comment: Socially    Drug use: No    Sexual activity: Not Currently     Social Determinants of Health     Financial Resource Strain: Low Risk     Difficulty of Paying Living Expenses: Not hard at all   Food Insecurity: No Food Insecurity    Worried About Running Out of Food in the Last Year: Never true    Ran Out of Food in the Last Year: Never true   Transportation Needs: No Transportation Needs    Lack of Transportation (Medical): No    Lack of Transportation (Non-Medical): No   Physical Activity: Insufficiently Active    Days of Exercise per Week: 2 days    Minutes of Exercise per Session: 30 min   Stress: No Stress Concern Present    Feeling of Stress : Only a little   Social Connections: Moderately Integrated    Frequency of Communication with Friends and Family: More than three times a week    Frequency of Social Gatherings with Friends and Family: More than three times a week    Attends [employer] Services: More than 4 times per year    Active Member of Clubs or Organizations: Yes    Attends Club or Organization Meetings: More than 4 times per year    Marital Status:    Housing Stability: Low Risk     Unable to Pay for Housing in the Last Year: No    Number of Places Lived in the Last Year: 1    Unstable Housing in the Last Year: No     Review of patient's allergies indicates:   Allergen Reactions    Codeine Itching       Review of Systems   Constitutional:  Negative for chills and fever.   HENT:  Negative for sore throat.    Respiratory:  Negative for cough and shortness of breath.    Cardiovascular:  Negative for chest pain.   Gastrointestinal:   Negative for nausea and vomiting.   Genitourinary:  Negative for dysuria, frequency and urgency.   Musculoskeletal:  Positive for arthralgias and myalgias. Negative for back pain.   Skin:  Negative for rash and wound.   Neurological:  Negative for syncope and weakness.   Hematological:  Does not bruise/bleed easily.   Psychiatric/Behavioral:  Negative for agitation, behavioral problems and confusion.      Physical Exam:     Initial Vitals [02/28/23 1941]   BP Pulse Resp Temp SpO2   128/72 82 18 98.4 °F (36.9 °C) 95 %      MAP       --         Physical Exam    Nursing note and vitals reviewed.  Constitutional: She appears well-developed and well-nourished. She is not diaphoretic. No distress.   HENT:   Head: Normocephalic and atraumatic.   Mouth/Throat: Oropharynx is clear and moist.   Eyes: EOM are normal. Pupils are equal, round, and reactive to light.   Neck: No tracheal deviation present.   Cardiovascular:  Normal rate, regular rhythm, normal heart sounds and intact distal pulses.           Pulmonary/Chest: Breath sounds normal. No stridor. No respiratory distress. She has no wheezes.   Abdominal: Abdomen is soft. Bowel sounds are normal. She exhibits no distension and no mass. There is no abdominal tenderness.   Musculoskeletal:         General: No edema. Normal range of motion.      Left ankle:      Left Achilles Tendon: Tenderness and defect present. Patel's test positive.      Comments: Diffuse swelling, tenderness, and loss of definition of L Achilles tendon. No ankle flexion with calf squeeze.      Neurological: She is alert and oriented to person, place, and time. She has normal strength. No cranial nerve deficit or sensory deficit.   Skin: Skin is warm and dry. Capillary refill takes less than 2 seconds. No pallor.   Psychiatric: She has a normal mood and affect. Her behavior is normal. Thought content normal.       ED Course:   Splint Application    Date/Time: 3/1/2023 12:15 AM  Performed by: Jon RAMIREZ  MD Sonny  Authorized by: Jon Dennis MD   Location details: left ankle  Splint type: short leg  Supplies used: Ortho-Glass  Post-procedure: The splinted body part was neurovascularly unchanged following the procedure.  Patient tolerance: Patient tolerated the procedure well with no immediate complications        Medical Decision Making:    History Acquisition:   Additional historians utilized:  none    Prior medical records were reviewed:   FM visit 2/2023 for f/u chronic HLD, tobacco use, prior MI  FM visit 09/2022 for f/u hot flashes, prediabetes  Cards visit 05/2022 for f/u of PFO.    The patient's list of active medical problems, social history, medications, and allergies as documented has been reviewed.     Differential Diagnoses:   Based on available information and initial assessment, Differential Diagnosis includes, but is not limited to:  Fracture, dislocation, compartment syndrome, nerve injury/palsy, vascular injury, DVT, rhabdomyolysis, hemarthrosis, septic joint, cellulitis, bursitis, muscle strain, ligament tear/sprain, laceration, foreign body, abrasion, soft tissue contusion, osteoarthritis.        EKG:        Labs:   Labs Reviewed - No data to display  Independent review of the labs ordered include:   none    Imaging:     Imaging Results              X-Ray Ankle Complete Left (Final result)  Result time 02/28/23 23:47:32      Final result by Maricel Enamorado MD (02/28/23 23:47:32)                   Impression:      No evidence of acute abnormality.    Incidental nonacute findings as above.      Electronically signed by: Maricel Enamorado  Date:    02/28/2023  Time:    23:47               Narrative:    EXAMINATION:  XR ANKLE COMPLETE 3 VIEW LEFT    CLINICAL HISTORY:  Unspecified injury of unspecified ankle, initial encounter    TECHNIQUE:  AP, lateral and oblique views of the left ankle were performed.    COMPARISON:  None    FINDINGS:  There is no appreciable acute fracture or dislocation  involving the left ankle.  Ankle mortise is well maintained.  There is osteophyte formation along the ankle joint.  Plantar and dorsal small calcaneal enthesophytes are also noted.  No radiopaque foreign bodies are noted in the soft tissues allowing for incidental superficial well-circumscribed calcifications dorsal to the Achilles tendon.                                    X-Rays:   Independently Interpreted Readings:   Other Readings:  X-ray ankle independently interpreted: no fracture or dislocation.       Additional Consideration:   Additional testing considered during clinical course: none    Social determinants of health considered during development of treatment plan include: tobacco use    Current co-morbidities considered which impacted clinical decision making: TIA/stroke, MI, bronchitis, HLD, prediabetes    Case discussed with additional provider: none    Medications   HYDROcodone-acetaminophen 5-325 mg per tablet 1 tablet (1 tablet Oral Given 3/1/23 0028)      Medical Decision Making:   Initial Assessment:   63 yo F with L ankle pain after fall. Exam concerning for Achilles tendinopathy given pain/swelling and abnormal Patel test.  Will obtain x-ray to rule out associated bony injury.  Plan to splint and refer to Ortho for f/u.   Independently Interpreted Test(s):   I have ordered and independently interpreted X-rays - see prior notes.  Clinical Tests:   Radiological Study: Ordered and Reviewed  ED Management:  X-ray without fracture.  Splinted in slight plantarflexion to maximize Achilles healing.  Ortho referral placed for close f/u. Pain medication prescribed. Crutches provided.      On re-evaluation, the patient's status has improved.  After complete ED evaluation, clinical impression is most consistent with achilles tendon injury.  Ortho follow-up within 3-5 days was recommended.    After taking into careful account the patient's history, physical exam findings, as well as empirical and objective  data obtained throughout ED workup, I feel no emergent medical condition has been identified. No further evaluation or admission was felt to be required, and the patient is stable for discharge from the ED. The patient and any additional family present were updated with test results, overall clinical impression, and recommended further plan of care, including discharge instructions as provided and outpatient follow-up for continued evaluation and management as needed. All questions were answered. The patient expressed understanding and agreed with current plan for discharge and follow-up plan of care. Strict ED return precautions were provided, including return/worsening of current symptoms, new symptoms, or any other concerns.                Clinical Impression:       ICD-10-CM ICD-9-CM   1. Rupture of left Achilles tendon, initial encounter  S86.012A 845.09   2. Ankle injury  S99.919A 959.7       Discharge Medications:  Current Discharge Medication List        START taking these medications    Details   HYDROcodone-acetaminophen (NORCO) 5-325 mg per tablet Take 1 tablet by mouth every 6 (six) hours as needed for Pain.  Qty: 12 tablet, Refills: 0    Comments: Quantity prescribed more than 7 day supply? No                ED Disposition Condition    Discharge Stable               Jon Dennis MD  03/01/23 1312       Jon Dennis MD  03/02/23 0058       Jon Dennis MD  03/14/23 1743

## 2023-03-01 NOTE — ED NOTES
Patient education provided on move from ED 14 to ED 8 at this time.  Verbal understanding obtained.

## 2023-03-01 NOTE — ED NOTES
Patient education provided on LOS with verbal understanding.  Placed in stretcher with foot elevated at this time.  Patient reports she is happy to be seen in the hallway, stating others may need the room.  Education provided.  Patient RR regular and non labored.  Skin W/D/P.  Pleasant.  Cooperative.

## 2023-03-02 ENCOUNTER — OFFICE VISIT (OUTPATIENT)
Dept: ORTHOPEDICS | Facility: CLINIC | Age: 63
End: 2023-03-02
Payer: MEDICARE

## 2023-03-02 VITALS
BODY MASS INDEX: 32.98 KG/M2 | HEIGHT: 67 IN | DIASTOLIC BLOOD PRESSURE: 59 MMHG | HEART RATE: 72 BPM | WEIGHT: 210.13 LBS | SYSTOLIC BLOOD PRESSURE: 103 MMHG

## 2023-03-02 DIAGNOSIS — M25.572 ACUTE LEFT ANKLE PAIN: ICD-10-CM

## 2023-03-02 DIAGNOSIS — S86.012A RUPTURE OF LEFT ACHILLES TENDON, INITIAL ENCOUNTER: Primary | ICD-10-CM

## 2023-03-02 PROCEDURE — 99204 OFFICE O/P NEW MOD 45 MIN: CPT | Mod: HCNC,S$GLB,, | Performed by: PHYSICIAN ASSISTANT

## 2023-03-02 PROCEDURE — 3074F PR MOST RECENT SYSTOLIC BLOOD PRESSURE < 130 MM HG: ICD-10-PCS | Mod: HCNC,CPTII,S$GLB, | Performed by: PHYSICIAN ASSISTANT

## 2023-03-02 PROCEDURE — 1159F MED LIST DOCD IN RCRD: CPT | Mod: HCNC,CPTII,S$GLB, | Performed by: PHYSICIAN ASSISTANT

## 2023-03-02 PROCEDURE — 3008F BODY MASS INDEX DOCD: CPT | Mod: HCNC,CPTII,S$GLB, | Performed by: PHYSICIAN ASSISTANT

## 2023-03-02 PROCEDURE — 99999 PR PBB SHADOW E&M-EST. PATIENT-LVL IV: ICD-10-PCS | Mod: PBBFAC,HCNC,, | Performed by: PHYSICIAN ASSISTANT

## 2023-03-02 PROCEDURE — 99204 PR OFFICE/OUTPT VISIT, NEW, LEVL IV, 45-59 MIN: ICD-10-PCS | Mod: HCNC,S$GLB,, | Performed by: PHYSICIAN ASSISTANT

## 2023-03-02 PROCEDURE — 3044F PR MOST RECENT HEMOGLOBIN A1C LEVEL <7.0%: ICD-10-PCS | Mod: HCNC,CPTII,S$GLB, | Performed by: PHYSICIAN ASSISTANT

## 2023-03-02 PROCEDURE — 3044F HG A1C LEVEL LT 7.0%: CPT | Mod: HCNC,CPTII,S$GLB, | Performed by: PHYSICIAN ASSISTANT

## 2023-03-02 PROCEDURE — 99999 PR PBB SHADOW E&M-EST. PATIENT-LVL IV: CPT | Mod: PBBFAC,HCNC,, | Performed by: PHYSICIAN ASSISTANT

## 2023-03-02 PROCEDURE — 3078F DIAST BP <80 MM HG: CPT | Mod: HCNC,CPTII,S$GLB, | Performed by: PHYSICIAN ASSISTANT

## 2023-03-02 PROCEDURE — 3078F PR MOST RECENT DIASTOLIC BLOOD PRESSURE < 80 MM HG: ICD-10-PCS | Mod: HCNC,CPTII,S$GLB, | Performed by: PHYSICIAN ASSISTANT

## 2023-03-02 PROCEDURE — 3074F SYST BP LT 130 MM HG: CPT | Mod: HCNC,CPTII,S$GLB, | Performed by: PHYSICIAN ASSISTANT

## 2023-03-02 PROCEDURE — 3008F PR BODY MASS INDEX (BMI) DOCUMENTED: ICD-10-PCS | Mod: HCNC,CPTII,S$GLB, | Performed by: PHYSICIAN ASSISTANT

## 2023-03-02 PROCEDURE — 1159F PR MEDICATION LIST DOCUMENTED IN MEDICAL RECORD: ICD-10-PCS | Mod: HCNC,CPTII,S$GLB, | Performed by: PHYSICIAN ASSISTANT

## 2023-03-02 NOTE — PROGRESS NOTES
Subjective:      Patient ID: Maria M Mei is a 62 y.o. female.    Chief Complaint: Injury and Pain of the Left Ankle      62-year-old female presents with 2 day history of left ankle pain.  She states she stepped off the porch and felt a pop and pain to the posterior ankle.  She states she felt like she was hit by a board.  She went to the ED and was diagnosed with Achilles tear.  She was given a splint and Norco.  She is currently taking gabapentin, Flexeril, diclofenac 75 mg chronically for other complaints.  She just currently started taking Norco.  She states this does not help.  She is been taking her daughter's medication for pain.      Review of Systems   Constitutional: Negative for chills and fever.   Cardiovascular:  Negative for chest pain.   Respiratory:  Negative for cough.    Hematologic/Lymphatic: Does not bruise/bleed easily.   Skin:  Negative for poor wound healing and rash.   Musculoskeletal:  Positive for joint pain, joint swelling, muscle weakness, myalgias and stiffness.   Gastrointestinal:  Negative for abdominal pain.   Genitourinary:  Negative for bladder incontinence.   Neurological:  Negative for dizziness, loss of balance and weakness.   Psychiatric/Behavioral:  Negative for altered mental status.      Review of patient's allergies indicates:   Allergen Reactions    Codeine Itching        Current Outpatient Medications   Medication Sig Dispense Refill    albuterol (VENTOLIN HFA) 90 mcg/actuation inhaler Inhale 2 puffs into the lungs every 6 (six) hours as needed for Wheezing or Shortness of Breath.  Rescue 18 g 0    ascorbic acid, vitamin C, (VITAMIN C) 250 MG tablet Take 250 mg by mouth once daily.      aspirin 81 MG Chew Chew and swallow 1 tablet (81 mg total) by mouth once daily. 30 tablet 0    atorvastatin (LIPITOR) 40 MG tablet TAKE 1 TABLET EVERY DAY 90 tablet 2    buPROPion (WELLBUTRIN XL) 150 MG TB24 tablet Take 2 tablets (300 mg total) by mouth once daily. 180 tablet 3     "cyclobenzaprine (FLEXERIL) 5 MG tablet TAKE 1 TABLET (5 MG TOTAL) BY MOUTH 2 (TWO) TIMES DAILY AS NEEDED. 30 tablet 2    diclofenac (VOLTAREN) 75 MG EC tablet TAKE 1 TABLET (75 MG TOTAL) BY MOUTH 2 (TWO) TIMES DAILY AS NEEDED. 60 tablet 1    gabapentin (NEURONTIN) 100 MG capsule Take 1 capsule (100 mg total) by mouth every evening. 30 capsule 11    HYDROcodone-acetaminophen (NORCO) 5-325 mg per tablet Take 1 tablet by mouth every 6 (six) hours as needed for Pain. 12 tablet 0    hydrOXYzine pamoate (VISTARIL) 25 MG Cap TAKE 1 CAPSULE NIGHTLY AS NEEDED (SLEEP). 30 capsule 2    nicotine (NICODERM CQ) 21 mg/24 hr Place 1 patch onto the skin once daily. 28 patch 0    nicotine, polacrilex, (NICORETTE) 2 mg Gum Take 1 each (2 mg total) by mouth as needed (as needed). 100 each 0    pantoprazole (PROTONIX) 40 MG tablet TAKE 1 TABLET ONE TIME DAILY 90 tablet 3    zinc gluconate 50 mg tablet Take 50 mg by mouth once daily.       No current facility-administered medications for this visit.        The patient's relevant past medical, surgical, and social history was reviewed in Epic.       Objective:      VITAL SIGNS: BP (!) 103/59   Pulse 72   Ht 5' 7" (1.702 m)   Wt 95.3 kg (210 lb 1.6 oz)   BMI 32.91 kg/m²     General    Nursing note and vitals reviewed.  Constitutional: She is oriented to person, place, and time. She appears well-developed and well-nourished.   Neurological: She is alert and oriented to person, place, and time.     General Musculoskeletal Exam   Gait: antalgic     Left Ankle/Foot Exam     Inspection  Deformity: present  Bruising: Ankle - present     Pain   The patient exhibits pain of the Achilles tendon.    Range of Motion   Ankle Joint  Dorsiflexion:  normal   Plantar flexion:  abnormal     Subtalar Joint   Inversion:  normal   Eversion:  normal   Patel Test:  positive    Other   Sensation: normal    Comments:  Palpable gap felt proximal achilles tendon by the musculotendinous junction      Muscle " Strength   Left Lower Extremity   Anterior tibial:  4/5   Posterior tibial:  4/5   Gastrocsoleus:  3/5   Peroneal muscle:  4/5     Vascular Exam       Left Pulses  Dorsalis Pedis:      2+  Posterior Tibial:      1+       X-Ray Ankle Complete Left  Narrative: EXAMINATION:  XR ANKLE COMPLETE 3 VIEW LEFT    CLINICAL HISTORY:  Unspecified injury of unspecified ankle, initial encounter    TECHNIQUE:  AP, lateral and oblique views of the left ankle were performed.    COMPARISON:  None    FINDINGS:  There is no appreciable acute fracture or dislocation involving the left ankle.  Ankle mortise is well maintained.  There is osteophyte formation along the ankle joint.  Plantar and dorsal small calcaneal enthesophytes are also noted.  No radiopaque foreign bodies are noted in the soft tissues allowing for incidental superficial well-circumscribed calcifications dorsal to the Achilles tendon.  Impression: No evidence of acute abnormality.    Incidental nonacute findings as above.    Electronically signed by: Maricel Enamorado  Date:    02/28/2023  Time:    23:47    I have reviewed the above radiograph and agree with the findings stated by the radiologist.         Assessment:       1. Rupture of left Achilles tendon, initial encounter    2. Acute left ankle pain          Plan:         Maria M was seen today for injury and pain.    Diagnoses and all orders for this visit:    Rupture of left Achilles tendon, initial encounter  -     Ambulatory referral/consult to Orthopedics  -     AIR CAST WALKER BOOT FOR HOME USE    Acute left ankle pain  -     AIR CAST WALKER BOOT FOR HOME USE    Left ankle Achilles rupture, complete.  Discussed the options with the patient which would be conservative treatment versus surgical intervention.  She would like to proceed with conservative treatment.  This would consist of a tall Cam walker boot with heel lifts.  Weightbearing as tolerated when able to.  She may continue crutches if needed.  Ice,  elevation, continuation of anti-inflammatories and narcotic pain medicine as needed.  I will see her back in 6 weeks for repeat assessment.  Plan to transition to physical therapy then.    Diagnoses and plan discussed with the patient, as well as the expected course and duration of his symptoms.  All questions and concerns were addressed prior to the end of the visit.   Instructed patient to call office if they have any future questions/concerns or to schedule apt. Patient will return to see me if symptoms worsen or fail to improve    Note dictated with voice recognition software, please excuse any grammatical errors.        Ana Abdi PA-C   03/02/2023

## 2023-04-18 ENCOUNTER — OFFICE VISIT (OUTPATIENT)
Dept: ORTHOPEDICS | Facility: CLINIC | Age: 63
End: 2023-04-18
Payer: MEDICARE

## 2023-04-18 VITALS
WEIGHT: 215.19 LBS | DIASTOLIC BLOOD PRESSURE: 77 MMHG | HEIGHT: 67 IN | SYSTOLIC BLOOD PRESSURE: 131 MMHG | HEART RATE: 72 BPM | BODY MASS INDEX: 33.78 KG/M2

## 2023-04-18 DIAGNOSIS — S86.012A RUPTURE OF LEFT ACHILLES TENDON, INITIAL ENCOUNTER: Primary | ICD-10-CM

## 2023-04-18 DIAGNOSIS — M25.572 ACUTE LEFT ANKLE PAIN: ICD-10-CM

## 2023-04-18 PROCEDURE — 3044F PR MOST RECENT HEMOGLOBIN A1C LEVEL <7.0%: ICD-10-PCS | Mod: HCNC,CPTII,S$GLB, | Performed by: PHYSICIAN ASSISTANT

## 2023-04-18 PROCEDURE — 99999 PR PBB SHADOW E&M-EST. PATIENT-LVL IV: ICD-10-PCS | Mod: PBBFAC,HCNC,, | Performed by: PHYSICIAN ASSISTANT

## 2023-04-18 PROCEDURE — 99213 PR OFFICE/OUTPT VISIT, EST, LEVL III, 20-29 MIN: ICD-10-PCS | Mod: HCNC,S$GLB,, | Performed by: PHYSICIAN ASSISTANT

## 2023-04-18 PROCEDURE — 1160F PR REVIEW ALL MEDS BY PRESCRIBER/CLIN PHARMACIST DOCUMENTED: ICD-10-PCS | Mod: HCNC,CPTII,S$GLB, | Performed by: PHYSICIAN ASSISTANT

## 2023-04-18 PROCEDURE — 3075F PR MOST RECENT SYSTOLIC BLOOD PRESS GE 130-139MM HG: ICD-10-PCS | Mod: HCNC,CPTII,S$GLB, | Performed by: PHYSICIAN ASSISTANT

## 2023-04-18 PROCEDURE — 1159F MED LIST DOCD IN RCRD: CPT | Mod: HCNC,CPTII,S$GLB, | Performed by: PHYSICIAN ASSISTANT

## 2023-04-18 PROCEDURE — 3078F PR MOST RECENT DIASTOLIC BLOOD PRESSURE < 80 MM HG: ICD-10-PCS | Mod: HCNC,CPTII,S$GLB, | Performed by: PHYSICIAN ASSISTANT

## 2023-04-18 PROCEDURE — 99999 PR PBB SHADOW E&M-EST. PATIENT-LVL IV: CPT | Mod: PBBFAC,HCNC,, | Performed by: PHYSICIAN ASSISTANT

## 2023-04-18 PROCEDURE — 3075F SYST BP GE 130 - 139MM HG: CPT | Mod: HCNC,CPTII,S$GLB, | Performed by: PHYSICIAN ASSISTANT

## 2023-04-18 PROCEDURE — 3044F HG A1C LEVEL LT 7.0%: CPT | Mod: HCNC,CPTII,S$GLB, | Performed by: PHYSICIAN ASSISTANT

## 2023-04-18 PROCEDURE — 3078F DIAST BP <80 MM HG: CPT | Mod: HCNC,CPTII,S$GLB, | Performed by: PHYSICIAN ASSISTANT

## 2023-04-18 PROCEDURE — 1160F RVW MEDS BY RX/DR IN RCRD: CPT | Mod: HCNC,CPTII,S$GLB, | Performed by: PHYSICIAN ASSISTANT

## 2023-04-18 PROCEDURE — 3008F BODY MASS INDEX DOCD: CPT | Mod: HCNC,CPTII,S$GLB, | Performed by: PHYSICIAN ASSISTANT

## 2023-04-18 PROCEDURE — 1159F PR MEDICATION LIST DOCUMENTED IN MEDICAL RECORD: ICD-10-PCS | Mod: HCNC,CPTII,S$GLB, | Performed by: PHYSICIAN ASSISTANT

## 2023-04-18 PROCEDURE — 3008F PR BODY MASS INDEX (BMI) DOCUMENTED: ICD-10-PCS | Mod: HCNC,CPTII,S$GLB, | Performed by: PHYSICIAN ASSISTANT

## 2023-04-18 PROCEDURE — 99213 OFFICE O/P EST LOW 20 MIN: CPT | Mod: HCNC,S$GLB,, | Performed by: PHYSICIAN ASSISTANT

## 2023-04-18 NOTE — PROGRESS NOTES
Subjective:      Patient ID: Maria M Mei is a 62 y.o. female.    Chief Complaint: Pain of the Left Ankle      63yo F follow up left ankle achilles rupture. 7 weeks from DOI. Has been compliant with wearing cam walker boot. Does home exercises and massage. Notes weakness out of the boot. Unable to walk on flat surface yet. No other complaints today.       Review of Systems   Constitutional: Negative for chills and fever.   Cardiovascular:  Negative for chest pain.   Respiratory:  Negative for cough.    Hematologic/Lymphatic: Does not bruise/bleed easily.   Skin:  Negative for poor wound healing and rash.   Musculoskeletal:  Positive for joint pain, muscle weakness, myalgias and stiffness.   Gastrointestinal:  Negative for abdominal pain.   Genitourinary:  Negative for bladder incontinence.   Neurological:  Negative for dizziness, loss of balance and weakness.   Psychiatric/Behavioral:  Negative for altered mental status.      Review of patient's allergies indicates:   Allergen Reactions    Codeine Itching        Current Outpatient Medications   Medication Sig Dispense Refill    albuterol (VENTOLIN HFA) 90 mcg/actuation inhaler Inhale 2 puffs into the lungs every 6 (six) hours as needed for Wheezing or Shortness of Breath.  Rescue 18 g 0    ascorbic acid, vitamin C, (VITAMIN C) 250 MG tablet Take 250 mg by mouth once daily.      aspirin 81 MG Chew Chew and swallow 1 tablet (81 mg total) by mouth once daily. 30 tablet 0    atorvastatin (LIPITOR) 40 MG tablet TAKE 1 TABLET EVERY DAY 90 tablet 2    buPROPion (WELLBUTRIN XL) 150 MG TB24 tablet Take 2 tablets (300 mg total) by mouth once daily. 180 tablet 3    cyclobenzaprine (FLEXERIL) 5 MG tablet TAKE 1 TABLET (5 MG TOTAL) BY MOUTH 2 (TWO) TIMES DAILY AS NEEDED. 30 tablet 2    diclofenac (VOLTAREN) 75 MG EC tablet TAKE 1 TABLET (75 MG TOTAL) BY MOUTH 2 (TWO) TIMES DAILY AS NEEDED. 60 tablet 1    gabapentin (NEURONTIN) 100 MG capsule Take 1 capsule (100 mg total)  "by mouth every evening. 30 capsule 11    hydrOXYzine pamoate (VISTARIL) 25 MG Cap TAKE 1 CAPSULE NIGHTLY AS NEEDED (SLEEP). 30 capsule 2    nicotine (NICODERM CQ) 21 mg/24 hr Place 1 patch onto the skin once daily. 28 patch 0    nicotine, polacrilex, (NICORETTE) 2 mg Gum Take 1 each (2 mg total) by mouth as needed (as needed). 100 each 0    pantoprazole (PROTONIX) 40 MG tablet TAKE 1 TABLET ONE TIME DAILY 90 tablet 3    zinc gluconate 50 mg tablet Take 50 mg by mouth once daily.       No current facility-administered medications for this visit.        The patient's relevant past medical, surgical, and social history was reviewed in Epic.       Objective:      VITAL SIGNS: /77   Pulse 72   Ht 5' 7" (1.702 m)   Wt 97.6 kg (215 lb 2.7 oz)   BMI 33.70 kg/m²     General    Nursing note and vitals reviewed.  Constitutional: She is oriented to person, place, and time. She appears well-developed and well-nourished.   Neurological: She is alert and oriented to person, place, and time.     General Musculoskeletal Exam   Gait: antalgic     Left Ankle/Foot Exam     Tenderness   The patient is tender to palpation of the Achilles tendon.    Range of Motion   Ankle Joint  Dorsiflexion:  normal   Plantar flexion:  abnormal     Subtalar Joint   Inversion:  normal   Eversion:  normal   Patel Test:  positive    Other   Sensation: normal    Comments:  Palpable gap felt distal achilles tendon.       Muscle Strength   Left Lower Extremity   Anterior tibial:  5/5   Posterior tibial:  5/5   Gastrocsoleus:  4/5   Peroneal muscle:  5/5          Assessment:       1. Rupture of left Achilles tendon, initial encounter    2. Acute left ankle pain          Plan:         Maria M was seen today for pain.    Diagnoses and all orders for this visit:    Rupture of left Achilles tendon, initial encounter  -     Ambulatory referral/consult to Physical/Occupational Therapy; Future    Acute left ankle pain  -     Ambulatory referral/consult " to Physical/Occupational Therapy; Future    Improving left ankle achilles rupture. Decent PF strength noted today. I recommend PT for ankle strengthening, stretching, proprioceptive exercises.  Remove the heel lift today.  Continue cam walker boot without heel lift for a couple of days. She will be eventually transition into comfortable tennis shoe with help of PT. Will follow up in 8 weeks.     Diagnoses and plan discussed with the patient, as well as the expected course and duration of his symptoms.  All questions and concerns were addressed prior to the end of the visit.   Instructed patient to call office if they have any future questions/concerns or to schedule apt. Patient will return to see me if symptoms worsen or fail to improve    Note dictated with voice recognition software, please excuse any grammatical errors.        Ana Abdi PA-C   04/18/2023

## 2023-04-19 ENCOUNTER — CLINICAL SUPPORT (OUTPATIENT)
Dept: REHABILITATION | Facility: HOSPITAL | Age: 63
End: 2023-04-19
Payer: MEDICARE

## 2023-04-19 DIAGNOSIS — Z74.09 DECREASED FUNCTIONAL MOBILITY AND ENDURANCE: ICD-10-CM

## 2023-04-19 DIAGNOSIS — S86.012A RUPTURE OF LEFT ACHILLES TENDON, INITIAL ENCOUNTER: ICD-10-CM

## 2023-04-19 DIAGNOSIS — M25.572 ACUTE LEFT ANKLE PAIN: ICD-10-CM

## 2023-04-19 DIAGNOSIS — R29.898 ANKLE WEAKNESS: ICD-10-CM

## 2023-04-19 PROCEDURE — 97161 PT EVAL LOW COMPLEX 20 MIN: CPT | Mod: HCNC,PN | Performed by: PHYSICAL THERAPIST

## 2023-04-19 PROCEDURE — 97110 THERAPEUTIC EXERCISES: CPT | Mod: HCNC,PN | Performed by: PHYSICAL THERAPIST

## 2023-04-19 NOTE — PLAN OF CARE
[facility]  Physical Therapy Initial Evaluation    Date: 4/19/2023   [id number]    Therapy Diagnosis:   Encounter Diagnoses   Name Primary?    Rupture of left Achilles tendon, initial encounter     Acute left ankle pain     Decreased functional mobility and endurance     Ankle weakness      Physician: Ana Abdi PA-C    Physician Orders: PT Eval and Treat   Wean out of the boot as tolerated into tennis shoe.   Medical Diagnosis from Referral:   S86.012A (ICD-10-CM) - Rupture of left Achilles tendon, initial encounter   M25.572 (ICD-10-CM) - Acute left ankle pain     Evaluation Date: 4/19/2023  Authorization Period Expiration: 4/17/24  Plan of Care Expiration: 6/14/23  Progress Note Due: 6/14/23  Visit # / Visits authorized: 1/ 1   FOTO: 1/ 5   PTA visit: 0/5    Precautions: Standard; Wean out of the boot as tolerated into tennis shoe.     Time In: 9:05 am  Time Out: 10:00 am  Total Appointment Time (timed & untimed codes): 55 minutes (LCE, TE)      SUBJECTIVE   Date of onset: 2/28/23    History of current condition - Maria M reports: she stepped off the porch and felt a pop and pain to the posterior ankle.  She states she felt like she was hit by a board.  She went to the ED and was diagnosed with Achilles tear.  She was given a splint and Norco. Surgical and conservative options were discussed on her 1st ortho visit and she chose conservative. She was given a CAM boot with heel lifts and allowed to be WBAT as able. She's now been referred to [facility] for PT. She's excited to start PT.    Falls: none    Imaging, bone scan films: FINDINGS:  There is no appreciable acute fracture or dislocation involving the left ankle.  Ankle mortise is well maintained.  There is osteophyte formation along the ankle joint.  Plantar and dorsal small calcaneal enthesophytes are also noted.  No radiopaque foreign bodies are noted in the soft tissues allowing  for incidental superficial well-circumscribed calcifications dorsal to the Achilles tendon.  Impression: No evidence of acute abnormality.    Prior Therapy: none  Social History: 1 JULIA in house with daughter and grandson  Occupation: retired; helps at [employer]  Prior Level of Function: independent  Current Level of Function: limited walking distances, showering on shower chair, more awkward doing ADLs (limiting cooking)    Pain:  Current 0/10, worst 7/10, best 0/10   Location: left achilles  Description: tender, burning  Aggravating Factors: standing too much  Easing Factors: self massage, light scratching    Patients goals: take her dog for a walk (medium size dog)     Medical History:   Past Medical History:   Diagnosis Date    COVID-19 12/2020    Hyperlipidemia     MI (myocardial infarction)     Stroke        Surgical History:   Maria M Mei  has a past surgical history that includes Ankle surgery and Cholecystectomy.    Medications:   Maria M has a current medication list which includes the following prescription(s): albuterol, ascorbic acid (vitamin c), aspirin, atorvastatin, bupropion, cyclobenzaprine, diclofenac, gabapentin, hydroxyzine pamoate, nicotine, nicotine (polacrilex), pantoprazole, and zinc gluconate.    Allergies:   Review of patient's allergies indicates:   Allergen Reactions    Codeine Itching          OBJECTIVE       Palpation: divot in achilles    Posture: right weight shift    Gross Movement Analysis:  - Gait:  in CAM boot with cane lightly used in left   With cane in right upper extremity with shoe, left trendelenberg, toe out, moderate push off  - Sit-to-Stand: light upper extremity use, right weight shift      Range of Motion:   LE Right (Active/Passive) Left (Active/Passive)   Hip flexion WNL WNL   Hip abduction WNL WNL   Hip ER WNL WNL   Knee Flexion WNL WNL   Knee Extension WNL WNL     Ankle  Right   Left  Pain/Dysfunction with Movement    AROM PROM MMT AROM PROM MMT WNL   Great  "toe (45/70 deg)          Plantarflexion (50 deg) 50   50      Dorsiflexion (20 deg) 18  5/5 14  4/5    Inversion (20-30 deg) 40  5/5 44  4-/5    Eversion (5-10 deg) 22  5/5 12  4-/5        Lower Extremity Strength                 LE           Right           Left   Hip flexion: 10.9 kg 7.8 kg   Hip abduction 5/5 5/5   Hip extension 5/5 5/5   Hip ER 5/5 5/5   Knee flexion 15.4 13.2   Knee extension 22.5 kg 17.1 kg          Edema:   Girth Measurement Figure 8 Bimalleolar (3 cm above)   Left 51 cm 22.5 cm   Right 51 cm 23 cm       CMS Impairment/Limitation/Restriction for FOTO Ankle Survey    Therapist reviewed FOTO scores for Maria M Mei on 4/19/2023.   FOTO documents entered into EPIC - see Media section.    Limitation Score: 45%  Predicted Limitation Score: 37%         TREATMENT     Total Treatment time (time-based codes) separate from Evaluation: 8 minutes (TE)    Maria M received therapeutic exercises to develop strength, endurance, ROM, flexibility, posture, and core stabilization for 8 minutes including:  Home exercise program:  Seated heel raises: 10x  Theraband 4 ways: yellow theraband     Next:  Side lying eversion  Side lying inversion  Standing DF    Maria M received the following manual therapy techniques: Joint mobilizations, Myofacial release, and Soft tissue Mobilization were applied to the: left ankle for 0 minutes, including:  Not today    Maria M participated in neuromuscular re-education activities to improve: Balance, Coordination, Proprioception, and Posture for 0 minutes. The following activities were included:  Not today    Next:   Toe taps    Maria M participated in gait training to improve functional mobility and safety for 0  minutes, including:  Not today  Next:  Step to (forward) for gait retraining  Lateral walks    therapeutic activities to improve functional performance for 0  minutes, including:  Not today    Next:  Step up: 4" step    PATIENT EDUCATION AND HOME EXERCISES "     Education provided:   - anatomy  - importance of home exercise program compliance  - attendance policy    Written Home Exercises Provided: yes. Exercises were reviewed and Maria M was able to demonstrate them prior to the end of the session.  Maria M demonstrated good  understanding of the education provided. See EMR under Patient Instructions for exercises provided during therapy sessions.    ASSESSMENT     Maria M is a 62 y.o. female referred to outpatient Physical Therapy with a medical diagnosis of left achilles rupture. Pt presents with a CAM boot, but brought her shoe with her, using cane minimally in left upper extremity. After education, she reported feeling much more comfortable and steady ambulating with cane in right upper extremity. Better plantar flexion activation present than expected considering injury. Main complaint of mild cramping in calf. Overall ankle weakness present. Due to limitations, she's left the house less (minimal other than [employer]) and requires more help from family. She's appropriate for skilled PT to help her reach her goals.    Pt prognosis is Good.   Pt will benefit from skilled outpatient Physical Therapy to address the deficits stated above and in the chart below, provide pt/family education, and to maximize pt's level of independence.     Plan of care discussed with patient: Yes  Pt's spiritual, cultural and educational needs considered and patient is agreeable to the plan of care and goals as stated below:     Anticipated Barriers for therapy: age and injury  Medical Necessity is demonstrated by the following  History  Co-morbidities and personal factors that may impact the plan of care Co-morbidities:   Hx of CVA and TIA, PFO, hx of tobacco use    Personal Factors:   age     high   Examination  Body Structures and Functions, activity limitations and participation restrictions that may impact the plan of care Body Regions:   lower extremities    Body Systems:    gross  symmetry  ROM  strength  balance  gait  transfers  transitions  motor control  motor learning  edema    Participation Restrictions:   Walking    Activity limitations:   Learning and applying knowledge  no deficits    General Tasks and Commands  no deficits    Communication  no deficits    Mobility  lifting and carrying objects  walking  driving (bike, car, motorcycle)    Self care  washing oneself (bathing, drying, washing hands)  caring for body parts (brushing teeth, shaving, grooming)  toileting  dressing  looking after one's health    Domestic Life  shopping  cooking  doing house work (cleaning house, washing dishes, laundry)  assisting others    Interactions/Relationships  no deficits    Life Areas  no deficits    Community and Social Life  community life  recreation and leisure         high     Clinical Presentation stable and uncomplicated low   Decision Making/ Complexity Score: low   Goals:  Short Term Goals: 4 weeks   1.  Patient will demonstrate left eversion MMT  > 4/5 degree to improve gait pattern.  2.  Patient will demonstrate left quad strength via MicroFET > 20 kg for improved functional mobility.  3.  Patient will report no increase in pain ambulating household distances in shoe without a device.     Long Term Goals: 12 weeks   1.  Patient will demonstrate improved ankle stability by performing single leg stance for 15 seconds.  2.  Patient will demonstrate ability to ambulate at least 900 ft in shoe in 6 minutes for improved endurance.  3.  Patient will demonstrate to PT comprehensive understanding of each HEP component without verbal cueing.   4.  Patient will improve FOTO to < 38% to improve ADL performance.    PLAN   Plan of care Certification: 4/19/2023 to 6/14/23.    Outpatient Physical Therapy 2 times weekly for 8 weeks to include the following interventions: Electrical Stimulation TENS, IFC, NMES, Gait Training, Manual Therapy, Moist Heat/ Ice, Neuromuscular Re-ed, Patient Education, Self  Care, Therapeutic Activities, Therapeutic Exercise, and dry needling.     Justyn Pop, PT      I CERTIFY THE NEED FOR THESE SERVICES FURNISHED UNDER THIS PLAN OF TREATMENT AND WHILE UNDER MY CARE   Physician's comments:     Physician's Signature: ___________________________________________________

## 2023-04-21 ENCOUNTER — PES CALL (OUTPATIENT)
Dept: ADMINISTRATIVE | Facility: CLINIC | Age: 63
End: 2023-04-21
Payer: MEDICARE

## 2023-04-21 ENCOUNTER — CLINICAL SUPPORT (OUTPATIENT)
Dept: REHABILITATION | Facility: HOSPITAL | Age: 63
End: 2023-04-21
Payer: MEDICARE

## 2023-04-21 DIAGNOSIS — R29.898 ANKLE WEAKNESS: ICD-10-CM

## 2023-04-21 DIAGNOSIS — Z74.09 DECREASED FUNCTIONAL MOBILITY AND ENDURANCE: Primary | ICD-10-CM

## 2023-04-21 PROCEDURE — 97110 THERAPEUTIC EXERCISES: CPT | Mod: HCNC,PN,CQ

## 2023-04-21 PROCEDURE — 97116 GAIT TRAINING THERAPY: CPT | Mod: HCNC,PN,CQ

## 2023-04-21 PROCEDURE — 97530 THERAPEUTIC ACTIVITIES: CPT | Mod: HCNC,PN,CQ

## 2023-04-21 NOTE — PROGRESS NOTES
"                              Physical Therapy Daily Treatment Note     Name: Maria M Ochoa  Clinic Number: [id number]    Therapy Diagnosis: No diagnosis found.  Physician: Ana Abdi PA-C    Visit Date: 4/21/2023       Physician Orders: PT Eval and Treat   Wean out of the boot as tolerated into tennis shoe.   Medical Diagnosis from Referral:   S86.012A (ICD-10-CM) - Rupture of left Achilles tendon, initial encounter   M25.572 (ICD-10-CM) - Acute left ankle pain      Evaluation Date: 4/19/2023  Authorization Period Expiration: 4/17/24  Plan of Care Expiration: 6/14/23  Progress Note Due: 6/14/23  Visit # / Visits authorized: 1/ 1 1/20   FOTO: 2/ 5   PTA visit: 1/5     Precautions: Standard; Wean out of the boot as tolerated into tennis shoe.      Time In: 11:00  am  Time Out: 12:00 am  Total Appointment Time (timed & untimed codes): 53 minutes (2 TE, 1 GT, 1 TA)       Subjective     Pt reports: Patient reports 0/10 pain. Patient arrives in shoe stating she did not want to get boot wet in rainy weather. Patient report mild swelling build up denies skin breakdown.   She was compliant with home exercise program.  Response to previous treatment: 1st after   Functional change: 1st after     Pain: 0/10  Location: left ankles and feet      Objective        Maria M received therapeutic exercises to develop strength, endurance, ROM, flexibility, posture, and core stabilization for 28 minutes including:    Seated heel raises: 20x   Theraband 4 ways 20x5" : yellow theraband      Side lying eversion 20x5"   Side lying inversion 20x5"   Standing DF 20x5"      Maria M received the following manual therapy techniques: Joint mobilizations, Myofacial release, and Soft tissue Mobilization were applied to the: left ankle for 0 minutes, including:  Not today     Maria M participated in neuromuscular   re-education activities to improve: Balance, Coordination, Proprioception, and Posture for 5 minutes. The following activities " "were included:     Toe taps Cone x20 fwd, cross midline      Maria M participated in gait training to improve functional mobility and safety for 10   minutes, including:    Step forward and return for push off of gait 20x ea   Lateral walks 6 laps in // bar      therapeutic activities to improve functional performance for 10  minutes, including:  Not today     Step up: 4" step (Up R and Down R, Up L, Down L)   Mini Squats 2x10     Home Exercises Provided and Patient Education Provided     Education provided:   - anatomy  - importance of home exercise program compliance  - attendance policy     Written Home Exercises Provided: yes. Exercises were reviewed and Maria M was able to demonstrate them prior to the end of the session.  Maria M demonstrated good  understanding of the education provided. See EMR under Patient Instructions for exercises provided during therapy sessions.    Assessment     Maria M is a 62 y.o. female referred to outpatient Physical Therapy with a medical diagnosis of left achilles rupture. Initiated bolded activities under the direction of evaluating physical therapist [facility] patient with fair performance of activities today benefiting from // bar from standing activities due to instability and mirror due to off loading of LLE. Patient will benefit from continued skilled intervention to address deficits and progress towards goals.     Maria M Is progressing well towards her goals.   Pt prognosis is Good.     Pt will continue to benefit from skilled outpatient physical therapy to address the deficits listed in the problem list box on initial evaluation, provide pt/family education and to maximize pt's level of independence in the home and community environment.     Pt's spiritual, cultural and educational needs considered and pt agreeable to plan of care and goals.    Anticipated barriers to physical therapy: Age and injury    Goals: Short Term Goals: 4 weeks   1.  Patient will demonstrate left " eversion MMT  > 4/5 degree to improve gait pattern.  2.  Patient will demonstrate left quad strength via MicroFET > 20 kg for improved functional mobility.  3.  Patient will report no increase in pain ambulating household distances in shoe without a device.      Long Term Goals: 12 weeks   1.  Patient will demonstrate improved ankle stability by performing single leg stance for 15 seconds.  2.  Patient will demonstrate ability to ambulate at least 900 ft in shoe in 6 minutes for improved endurance.  3.  Patient will demonstrate to PT comprehensive understanding of each HEP component without verbal cueing.   4.  Patient will improve FOTO to < 38% to improve ADL performance.    Plan     Plan of care Certification: 4/19/2023 to 6/14/23.    Jake Neri PTA,

## 2023-04-24 ENCOUNTER — OFFICE VISIT (OUTPATIENT)
Dept: FAMILY MEDICINE | Facility: CLINIC | Age: 63
End: 2023-04-24
Payer: MEDICARE

## 2023-04-24 VITALS
BODY MASS INDEX: 34.39 KG/M2 | DIASTOLIC BLOOD PRESSURE: 58 MMHG | OXYGEN SATURATION: 98 % | WEIGHT: 219.13 LBS | HEART RATE: 64 BPM | SYSTOLIC BLOOD PRESSURE: 136 MMHG | HEIGHT: 67 IN

## 2023-04-24 DIAGNOSIS — Z00.00 ENCOUNTER FOR PREVENTIVE HEALTH EXAMINATION: Primary | ICD-10-CM

## 2023-04-24 DIAGNOSIS — I70.0 AORTIC ATHEROSCLEROSIS: ICD-10-CM

## 2023-04-24 DIAGNOSIS — Z87.891 PERSONAL HISTORY OF TOBACCO USE: ICD-10-CM

## 2023-04-24 DIAGNOSIS — I63.412 EMBOLIC STROKE INVOLVING LEFT MIDDLE CEREBRAL ARTERY: ICD-10-CM

## 2023-04-24 DIAGNOSIS — E78.2 MIXED HYPERLIPIDEMIA: ICD-10-CM

## 2023-04-24 DIAGNOSIS — R29.898 ANKLE WEAKNESS: ICD-10-CM

## 2023-04-24 DIAGNOSIS — J41.0 SIMPLE CHRONIC BRONCHITIS: ICD-10-CM

## 2023-04-24 PROCEDURE — G0439 PPPS, SUBSEQ VISIT: HCPCS | Mod: HCNC,S$GLB,,

## 2023-04-24 PROCEDURE — 3075F PR MOST RECENT SYSTOLIC BLOOD PRESS GE 130-139MM HG: ICD-10-PCS | Mod: HCNC,CPTII,S$GLB,

## 2023-04-24 PROCEDURE — 3044F HG A1C LEVEL LT 7.0%: CPT | Mod: HCNC,CPTII,S$GLB,

## 2023-04-24 PROCEDURE — 99999 PR PBB SHADOW E&M-EST. PATIENT-LVL V: CPT | Mod: PBBFAC,HCNC,,

## 2023-04-24 PROCEDURE — 1159F PR MEDICATION LIST DOCUMENTED IN MEDICAL RECORD: ICD-10-PCS | Mod: HCNC,CPTII,S$GLB,

## 2023-04-24 PROCEDURE — 1159F MED LIST DOCD IN RCRD: CPT | Mod: HCNC,CPTII,S$GLB,

## 2023-04-24 PROCEDURE — 3044F PR MOST RECENT HEMOGLOBIN A1C LEVEL <7.0%: ICD-10-PCS | Mod: HCNC,CPTII,S$GLB,

## 2023-04-24 PROCEDURE — 3078F DIAST BP <80 MM HG: CPT | Mod: HCNC,CPTII,S$GLB,

## 2023-04-24 PROCEDURE — 1160F RVW MEDS BY RX/DR IN RCRD: CPT | Mod: HCNC,CPTII,S$GLB,

## 2023-04-24 PROCEDURE — 1160F PR REVIEW ALL MEDS BY PRESCRIBER/CLIN PHARMACIST DOCUMENTED: ICD-10-PCS | Mod: HCNC,CPTII,S$GLB,

## 2023-04-24 PROCEDURE — 3008F BODY MASS INDEX DOCD: CPT | Mod: HCNC,CPTII,S$GLB,

## 2023-04-24 PROCEDURE — 99999 PR PBB SHADOW E&M-EST. PATIENT-LVL V: ICD-10-PCS | Mod: PBBFAC,HCNC,,

## 2023-04-24 PROCEDURE — G0439 PR MEDICARE ANNUAL WELLNESS SUBSEQUENT VISIT: ICD-10-PCS | Mod: HCNC,S$GLB,,

## 2023-04-24 PROCEDURE — 3008F PR BODY MASS INDEX (BMI) DOCUMENTED: ICD-10-PCS | Mod: HCNC,CPTII,S$GLB,

## 2023-04-24 PROCEDURE — 3075F SYST BP GE 130 - 139MM HG: CPT | Mod: HCNC,CPTII,S$GLB,

## 2023-04-24 PROCEDURE — 3078F PR MOST RECENT DIASTOLIC BLOOD PRESSURE < 80 MM HG: ICD-10-PCS | Mod: HCNC,CPTII,S$GLB,

## 2023-04-24 RX ORDER — CHOLECALCIFEROL (VITAMIN D3) 25 MCG
1000 TABLET ORAL DAILY
COMMUNITY

## 2023-04-24 NOTE — PATIENT INSTRUCTIONS
Counseling and Referral of Other Preventative  (Italic type indicates deductible and co-insurance are waived)    Patient Name: Maria M Mei  Today's Date: 4/24/2023    Health Maintenance       Date Due Completion Date    Influenza Vaccine (1) 09/01/2022 10/10/2016    TETANUS VACCINE 08/02/2023 (Originally 9/30/2015) 9/30/2005    Shingles Vaccine (1 of 2) 08/02/2023 (Originally 7/23/2011) ---    COVID-19 Vaccine (1) 08/02/2023 (Originally 1/23/1961) ---    Mammogram 08/22/2023 8/22/2022    Hemoglobin A1c (Prediabetes) 02/10/2024 2/10/2023    High Dose Statin 04/18/2024 4/18/2023    Aspirin/Antiplatelet Therapy 04/24/2024 4/24/2023 (Done)    Override on 4/24/2023: Done (pt takes ASA 81 mg daily)    Cervical Cancer Screening 06/21/2024 6/21/2021    Colorectal Cancer Screening 11/30/2026 11/30/2016    Lipid Panel 02/10/2028 2/10/2023        No orders of the defined types were placed in this encounter.    The following information is provided to all patients.  This information is to help you find resources for any of the problems found today that may be affecting your health:                Living healthy guide: www.[facility].[place].gov      Understanding Diabetes: www.diabetes.org      Eating healthy: www.cdc.gov/healthyweight      CDC home safety checklist: www.cdc.gov/steadi/patient.html      Agency on Aging: www.goea.[place].gov      Alcoholics anonymous (AA): www.aa.org      Physical Activity: www.gabo.nih.gov/zs6tzvj      Tobacco use: www.quitwithusla.org

## 2023-04-25 ENCOUNTER — PATIENT MESSAGE (OUTPATIENT)
Dept: FAMILY MEDICINE | Facility: CLINIC | Age: 63
End: 2023-04-25
Payer: MEDICARE

## 2023-04-25 ENCOUNTER — CLINICAL SUPPORT (OUTPATIENT)
Dept: REHABILITATION | Facility: HOSPITAL | Age: 63
End: 2023-04-25
Payer: MEDICARE

## 2023-04-25 DIAGNOSIS — R29.898 ANKLE WEAKNESS: ICD-10-CM

## 2023-04-25 DIAGNOSIS — Z74.09 DECREASED FUNCTIONAL MOBILITY AND ENDURANCE: Primary | ICD-10-CM

## 2023-04-25 PROCEDURE — 97530 THERAPEUTIC ACTIVITIES: CPT | Mod: HCNC,PN | Performed by: PHYSICAL THERAPIST

## 2023-04-25 PROCEDURE — 97112 NEUROMUSCULAR REEDUCATION: CPT | Mod: HCNC,PN | Performed by: PHYSICAL THERAPIST

## 2023-04-25 NOTE — PROGRESS NOTES
"                              Physical Therapy Daily Treatment Note     Name: Maria M ESTEVEZ [facility]  Clinic Number: [id number]    Therapy Diagnosis:   Encounter Diagnoses   Name Primary?    Decreased functional mobility and endurance Yes    Ankle weakness      Physician: Ana Abdi PA-C    Visit Date: 4/25/2023       Physician Orders: PT Eval and Treat   Wean out of the boot as tolerated into tennis shoe.   Medical Diagnosis from Referral:   S86.012A (ICD-10-CM) - Rupture of left Achilles tendon, initial encounter   M25.572 (ICD-10-CM) - Acute left ankle pain      Evaluation Date: 4/19/2023  Authorization Period Expiration: 4/17/24  Plan of Care Expiration: 6/14/23  Progress Note Due: 6/14/23  Visit # / Visits authorized: 1/ 1 2/20   FOTO: 3/ 5   PTA visit: 0/5     Precautions: Standard; Wean out of the boot as tolerated into tennis shoe.      Time In: 12:09 pm  Time Out: 1:00 pm  Total Appointment Time (timed & untimed codes): 26 1:1 minutes (1 NMR, 1 TA)       Subjective     Pt reports: she purchased an ankle brace and feels like the knot/cramping that she had been feeling in her calf is now down near the achilles. Overall, no pain.  She was compliant with home exercise program.  Response to previous treatment: no pain  Functional change: improving gait    Pain: 0/10  Location: left ankles and feet      Objective        Maria M received therapeutic exercises to develop strength, endurance, ROM, flexibility, posture, and core stabilization for 22 supervised minutes including:    Seated heel raises: 20x 10 pounds   Theraband 4 ways 20x5" : yellow theraband      Side lying eversion 20x5"   Side lying inversion 20x5"   Standing DF 20x5" (back against wall)     Maria M participated in neuromuscular re-education activities to improve: Balance, Coordination, Proprioception, and Posture for 12 minutes. The following activities were included:     Step to BOSU (round side up): left 20x with 2" holds (focusing on neutral " "foot position)  Toe taps Cone x20 fwd, cross midline (standing on left)     therapeutic activities to improve functional performance for 14 minutes, including:     Step up: 6" step (Up L, Down R) 2x10 no upper extremity   Mini Squats 2x10   Standing posterior tibialis heel raises: 2x10 (with tennis ball)    Maria M participated in gait training to improve functional mobility and safety for 0 minutes, including:    Step forward and return for push off of gait 20x ea   Lateral walks 6 laps in // bar      Maria M received the following manual therapy techniques: Joint mobilizations, Myofacial release, and Soft tissue Mobilization were applied to the: left ankle for 0 minutes, including:  Not today    Home Exercises Provided and Patient Education Provided     Education provided:   - anatomy  - importance of home exercise program compliance  - attendance policy     Written Home Exercises Provided: yes. Exercises were reviewed and Maria M was able to demonstrate them prior to the end of the session.  Maria M demonstrated good  understanding of the education provided. See EMR under Patient Instructions for exercises provided during therapy sessions.    Assessment     Maria M is a 62 y.o. female referred to outpatient Physical Therapy with a medical diagnosis of left achilles rupture. Pain remains minimal to none. Palpable divot from achilles rupture present. Focusing on surrounding ankle musculature and ankle stability. Able to perform double leg heel raise with posterior tib activation today.     Maria M Is progressing well towards her goals.   Pt prognosis is Good.     Pt will continue to benefit from skilled outpatient physical therapy to address the deficits listed in the problem list box on initial evaluation, provide pt/family education and to maximize pt's level of independence in the home and community environment.     Pt's spiritual, cultural and educational needs considered and pt agreeable to plan of care and " goals.    Anticipated barriers to physical therapy: Age and injury    Goals: Short Term Goals: 4 weeks   1.  Patient will demonstrate left eversion MMT  > 4/5 degree to improve gait pattern. PROGRESSING; NOT MET  2.  Patient will demonstrate left quad strength via MicroFET > 20 kg for improved functional mobility. PROGRESSING; NOT MET  3.  Patient will report no increase in pain ambulating household distances in shoe without a device.  PROGRESSING; NOT MET     Long Term Goals: 12 weeks   1.  Patient will demonstrate improved ankle stability by performing single leg stance for 15 seconds. PROGRESSING; NOT MET  2.  Patient will demonstrate ability to ambulate at least 900 ft in shoe in 6 minutes for improved endurance. PROGRESSING; NOT MET  3.  Patient will demonstrate to PT comprehensive understanding of each HEP component without verbal cueing.  PROGRESSING; NOT MET  4.  Patient will improve FOTO to < 38% to improve ADL performance. PROGRESSING; NOT MET    Plan     Cont per POC. Concentrate on post tib and peroneal activation as well as quad strength    Plan of care Certification: 4/19/2023 to 6/14/23.    Justyn Pop, PT,

## 2023-04-28 ENCOUNTER — CLINICAL SUPPORT (OUTPATIENT)
Dept: REHABILITATION | Facility: HOSPITAL | Age: 63
End: 2023-04-28
Payer: MEDICARE

## 2023-04-28 DIAGNOSIS — R29.898 ANKLE WEAKNESS: ICD-10-CM

## 2023-04-28 DIAGNOSIS — Z74.09 DECREASED FUNCTIONAL MOBILITY AND ENDURANCE: Primary | ICD-10-CM

## 2023-04-28 PROCEDURE — 97530 THERAPEUTIC ACTIVITIES: CPT | Mod: HCNC,PN | Performed by: PHYSICAL THERAPIST

## 2023-04-28 PROCEDURE — 97112 NEUROMUSCULAR REEDUCATION: CPT | Mod: HCNC,PN | Performed by: PHYSICAL THERAPIST

## 2023-04-28 NOTE — PROGRESS NOTES
"                              Physical Therapy Daily Treatment Note     [facility]  [id number]    Therapy Diagnosis:   Encounter Diagnoses   Name Primary?    Decreased functional mobility and endurance Yes    Ankle weakness      Physician: Ana Abdi PA-C    Visit Date: 4/28/2023       Physician Orders: PT Eval and Treat   Wean out of the boot as tolerated into tennis shoe.   Medical Diagnosis from Referral:   S86.012A (ICD-10-CM) - Rupture of left Achilles tendon, initial encounter   M25.572 (ICD-10-CM) - Acute left ankle pain      Evaluation Date: 4/19/2023  Authorization Period Expiration: 4/17/24  Plan of Care Expiration: 6/14/23  Progress Note Due: 6/14/23  Visit # / Visits authorized: 3/20 (+1)  FOTO: 4/ 5   PTA visit: 0/5     Precautions: Standard; Wean out of the boot as tolerated into tennis shoe.      Time In: 11:00 am  Time Out: 11:50 pm  Total Appointment Time (timed & untimed codes): 30 1:1 minutes (1 NMR, 1 TA)       Subjective     Pt reports: she only had a day of expected muscle soreness  She was compliant with home exercise program.  Response to previous treatment: no pain  Functional change: improving gait    Pain: 0/10  Location: left ankles and feet      Objective        Maria M received therapeutic exercises to develop strength, endurance, ROM, flexibility, posture, and core stabilization for 10 1:1 and 20 supervised minutes including:    Seated heel raises: 20x 10 pounds   Theraband 4 ways 20x5" : Red theraband      Side lying eversion 20x5"   Side lying inversion 20x5"   Standing DF 20x5" (back against wall)     Maria M participated in neuromuscular re-education activities to improve: Balance, Coordination, Proprioception, and Posture for 10 minutes. The following activities were included:     Step to BOSU (round side up): left 20x with 2" holds (focusing on neutral foot position)  Toe taps Cone x20 fwd, cross midline (standing on left)     therapeutic " "activities to improve functional performance for 10 1:1 minutes, including:     Step up: 6" step (Up L, Down R) 2x10 no upper extremity   Mini Squats 3x10   Standing posterior tibialis heel raises: 2x10 (with tennis ball)    Maria M participated in gait training to improve functional mobility and safety for 0 minutes, including:    Step forward and return for push off of gait 20x ea   Lateral walks 6 laps in // bar      Maria M received the following manual therapy techniques: Joint mobilizations, Myofacial release, and Soft tissue Mobilization were applied to the: left ankle for 0 minutes, including:  Not today    Home Exercises Provided and Patient Education Provided     Education provided:   - anatomy  - importance of home exercise program compliance  - attendance policy     Written Home Exercises Provided: yes. Exercises were reviewed and Maria M was able to demonstrate them prior to the end of the session.  Maria M demonstrated good  understanding of the education provided. See EMR under Patient Instructions for exercises provided during therapy sessions.    Assessment     Maria M is a 62 y.o. female referred to outpatient Physical Therapy with a medical diagnosis of left achilles rupture. Pain remains minimal to none. Palpable divot from achilles rupture present. Improved squat technique without cues today. Mild calf "cramping" with cone taps when stabilizing/balancing on left leg. Overall, she's progressing very well.    Maria M Is progressing well towards her goals.   Pt prognosis is Good.     Pt will continue to benefit from skilled outpatient physical therapy to address the deficits listed in the problem list box on initial evaluation, provide pt/family education and to maximize pt's level of independence in the home and community environment.     Pt's spiritual, cultural and educational needs considered and pt agreeable to plan of care and goals.    Anticipated barriers to physical therapy: Age and " injury    Goals: Short Term Goals: 4 weeks   1.  Patient will demonstrate left eversion MMT  > 4/5 degree to improve gait pattern. PROGRESSING; NOT MET  2.  Patient will demonstrate left quad strength via MicroFET > 20 kg for improved functional mobility. PROGRESSING; NOT MET  3.  Patient will report no increase in pain ambulating household distances in shoe without a device.  PROGRESSING; NOT MET     Long Term Goals: 12 weeks   1.  Patient will demonstrate improved ankle stability by performing single leg stance for 15 seconds. PROGRESSING; NOT MET  2.  Patient will demonstrate ability to ambulate at least 900 ft in shoe in 6 minutes for improved endurance. PROGRESSING; NOT MET  3.  Patient will demonstrate to PT comprehensive understanding of each HEP component without verbal cueing.  PROGRESSING; NOT MET  4.  Patient will improve FOTO to < 38% to improve ADL performance. PROGRESSING; NOT MET    Plan     Cont per POC. Concentrate on post tib and peroneal activation as well as quad strength    Plan of care Certification: 4/19/2023 to 6/14/23.    Justyn Pop, PT,

## 2023-05-01 ENCOUNTER — CLINICAL SUPPORT (OUTPATIENT)
Dept: REHABILITATION | Facility: HOSPITAL | Age: 63
End: 2023-05-01
Payer: MEDICARE

## 2023-05-01 DIAGNOSIS — Z74.09 DECREASED FUNCTIONAL MOBILITY AND ENDURANCE: Primary | ICD-10-CM

## 2023-05-01 DIAGNOSIS — R29.898 ANKLE WEAKNESS: ICD-10-CM

## 2023-05-01 PROCEDURE — 97530 THERAPEUTIC ACTIVITIES: CPT | Mod: PN | Performed by: PHYSICAL THERAPIST

## 2023-05-01 PROCEDURE — 97112 NEUROMUSCULAR REEDUCATION: CPT | Mod: PN | Performed by: PHYSICAL THERAPIST

## 2023-05-01 NOTE — PROGRESS NOTES
"                              Physical Therapy Daily Treatment Note     [facility]  [facility] [id number]    Therapy Diagnosis:   Encounter Diagnoses   Name Primary?    Decreased functional mobility and endurance Yes    Ankle weakness        Physician: Ana Abdi PA-C    Visit Date: 5/1/2023       Physician Orders: PT Eval and Treat   Wean out of the boot as tolerated into tennis shoe.   Medical Diagnosis from Referral:   S86.012A (ICD-10-CM) - Rupture of left Achilles tendon, initial encounter   M25.572 (ICD-10-CM) - Acute left ankle pain      Evaluation Date: 4/19/2023  Authorization Period Expiration: 4/17/24  Plan of Care Expiration: 6/14/23  Progress Note Due: 6/14/23  Visit # / Visits authorized: 4/20 (+1)  FOTO: 5/ 5 PERFORMED 5/1/23  PTA visit: 0/5     Precautions: Standard; Wean out of the boot as tolerated into tennis shoe.      Time In: 8:50 am  Time Out: 9:35 pm  Total Appointment Time (timed & untimed codes): 30 1:1 minutes (1 NMR, 1 TA)       Subjective     Pt reports: she didn't wear her boot at [facility] yesterday and did well.  She was compliant with home exercise program.  Response to previous treatment: no pain  Functional change: improving gait    Pain: 0/10  Location: left ankles and feet      Objective        Maria M received therapeutic exercises to develop strength, endurance, ROM, flexibility, posture, and core stabilization for 10 1:1 and 15 supervised minutes including:    Seated heel raises: 20x 10 pounds   Theraband 4 ways 30x2" : Red theraband      Side lying eversion 20x5"   Side lying inversion 20x5"   Standing DF 20x5" (back against wall)     Maria M participated in neuromuscular re-education activities to improve: Balance, Coordination, Proprioception, and Posture for 10 minutes. The following activities were included:     Step to BOSU (round side up): left 30x with 2" holds (focusing on neutral foot position)  Toe taps Cone x20 fwd, cross midline (standing on " "left)  Single leg stance: 5x10" left (moderate upper extremity use)     therapeutic activities to improve functional performance for 10 1:1 minutes, including:     Step up: 6" step (Up L, Down R) 3x10 no upper extremity   Mini Squats 3x10   Standing posterior tibialis heel raises: 3x10 (with tennis ball)    Maria M participated in gait training to improve functional mobility and safety for 0 minutes, including:    Step forward and return for push off of gait 20x ea   Lateral walks 6 laps in // bar      Maria M received the following manual therapy techniques: Joint mobilizations, Myofacial release, and Soft tissue Mobilization were applied to the: left ankle for 0 minutes, including:  Not today    Home Exercises Provided and Patient Education Provided     Education provided:   - anatomy  - importance of home exercise program compliance  - attendance policy     Written Home Exercises Provided: yes. Exercises were reviewed and Maria M was able to demonstrate them prior to the end of the session.  Maria M demonstrated good  understanding of the education provided. See EMR under Patient Instructions for exercises provided during therapy sessions.    Assessment     Maria M is a 62 y.o. female referred to outpatient Physical Therapy with a medical diagnosis of left achilles rupture. Pain remains minimal to none. Palpable divot from achilles rupture present. Deficit in single leg stance seen today (max of 1-2 seconds compared to > 10" on right). She continues to ambulate better in her shoe with improving stance time and push off.     Maria M Is progressing well towards her goals.   Pt prognosis is Good.     Pt will continue to benefit from skilled outpatient physical therapy to address the deficits listed in the problem list box on initial evaluation, provide pt/family education and to maximize pt's level of independence in the home and community environment.     Pt's spiritual, cultural and educational needs considered " and pt agreeable to plan of care and goals.    Anticipated barriers to physical therapy: Age and injury    Goals: Short Term Goals: 4 weeks   1.  Patient will demonstrate left eversion MMT  > 4/5 degree to improve gait pattern. PROGRESSING; NOT MET  2.  Patient will demonstrate left quad strength via MicroFET > 20 kg for improved functional mobility. PROGRESSING; NOT MET  3.  Patient will report no increase in pain ambulating household distances in shoe without a device.  PROGRESSING; NOT MET     Long Term Goals: 12 weeks   1.  Patient will demonstrate improved ankle stability by performing single leg stance for 15 seconds. PROGRESSING; NOT MET  2.  Patient will demonstrate ability to ambulate at least 900 ft in shoe in 6 minutes for improved endurance. PROGRESSING; NOT MET  3.  Patient will demonstrate to PT comprehensive understanding of each HEP component without verbal cueing.  PROGRESSING; NOT MET  4.  Patient will improve FOTO to < 38% to improve ADL performance. PROGRESSING; NOT MET    Plan     Cont per POC. Concentrate on post tib and peroneal activation as well as quad strength    Plan of care Certification: 4/19/2023 to 6/14/23.    Justyn Pop, PT,

## 2023-05-03 ENCOUNTER — CLINICAL SUPPORT (OUTPATIENT)
Dept: REHABILITATION | Facility: HOSPITAL | Age: 63
End: 2023-05-03
Payer: MEDICARE

## 2023-05-03 DIAGNOSIS — Z74.09 DECREASED FUNCTIONAL MOBILITY AND ENDURANCE: Primary | ICD-10-CM

## 2023-05-03 DIAGNOSIS — R29.898 ANKLE WEAKNESS: ICD-10-CM

## 2023-05-03 PROCEDURE — 97112 NEUROMUSCULAR REEDUCATION: CPT | Mod: PN | Performed by: PHYSICAL THERAPIST

## 2023-05-03 PROCEDURE — 97110 THERAPEUTIC EXERCISES: CPT | Mod: PN | Performed by: PHYSICAL THERAPIST

## 2023-05-03 PROCEDURE — 97530 THERAPEUTIC ACTIVITIES: CPT | Mod: PN | Performed by: PHYSICAL THERAPIST

## 2023-05-03 NOTE — PROGRESS NOTES
"                              Physical Therapy Daily Treatment Note     Name: Maria M ESTEVEZ [facility]  Clinic Number: [id number]    Therapy Diagnosis:   Encounter Diagnoses   Name Primary?    Decreased functional mobility and endurance Yes    Ankle weakness        Physician: Ana Abdi PA-C    Visit Date: 5/3/2023       Physician Orders: PT Eval and Treat   Wean out of the boot as tolerated into tennis shoe.   Medical Diagnosis from Referral:   S86.012A (ICD-10-CM) - Rupture of left Achilles tendon, initial encounter   M25.572 (ICD-10-CM) - Acute left ankle pain      Evaluation Date: 4/19/2023  Authorization Period Expiration: 4/17/24  Plan of Care Expiration: 6/14/23  Progress Note Due: 6/14/23  Visit # / Visits authorized: 5/20 (+1)  FOTO: 1/ 5 PERFORMED 5/1/23  PTA visit: 0/5     Precautions: Standard; Wean out of the boot as tolerated into tennis shoe.      Time In: 9:00 am  Time Out: 9:55 am  Total Appointment Time (timed & untimed codes): 54 1:1 minutes (1 NMR, 2 TA, 1 TE)       Subjective     Pt reports: she was on her feet a lot yesterday and just minor soreness. She's going to do gardening today.  She was compliant with home exercise program.  Response to previous treatment: minimal lateral ankle sore  Functional change: improving gait    Pain: 0/10  Location: left ankles and feet      Objective     5/3/23  quad MicroFET: right: 28.5 kg, left 28 kg  Left eversion MMT: 4+/5     Maria M received therapeutic exercises to develop strength, endurance, ROM, flexibility, posture, and core stabilization for 16 1:1 minutes including:    Theraband: 4 ways 30x2": Green theraband      Side lying eversion 20x10"   Side lying inversion 20x10"        Maria M participated in neuromuscular re-education activities to improve: Balance, Coordination, Proprioception, and Posture for 18 minutes. The following activities were included:     Step to BOSU (round side up): left 30x with 2" holds (focusing on neutral foot " "position)  Toe taps Cone x20 fwd, cross midline (standing on left)  Single leg stance: 5x10" left (minimal upper extremity use)  Walking outside (grass, slopes, stairs, curbs): 5 minutes      therapeutic activities to improve functional performance for 20 1:1 minutes, including:     Step up to 2nd step contralateral tap: @ 1st step (Up L, Down R) 3x10 no upper extremity   Mini Squats 3x10   Standing posterior tibialis heel raises: 3x10 (with tennis ball)  Resisted forward walk @ cable column: 7 pounds each 10x (cues to spread feet apart)      Home Exercises Provided and Patient Education Provided     Education provided:   - anatomy  - importance of home exercise program compliance  - attendance policy     Written Home Exercises Provided: yes. Exercises were reviewed and Maria M was able to demonstrate them prior to the end of the session.  Maria M demonstrated good  understanding of the education provided. See EMR under Patient Instructions for exercises provided during therapy sessions.    Assessment     Maria M is a 62 y.o. female referred to outpatient Physical Therapy with a medical diagnosis of left achilles rupture. Pain remains minimal to none. Palpable divot from achilles rupture present. Improved single leg stance with ability to perform for 7-8 seconds prior to LOB. Mild unsteadiness ambulating outside but not assist needed. Narrow base of support with mild scissoring gait present with uneven surfaces and resisted cable column walk. Cues given to inc base of support and she expressed an understanding.    Maria M Is progressing well towards her goals.   Pt prognosis is Good.     Pt will continue to benefit from skilled outpatient physical therapy to address the deficits listed in the problem list box on initial evaluation, provide pt/family education and to maximize pt's level of independence in the home and community environment.     Pt's spiritual, cultural and educational needs considered and pt " agreeable to plan of care and goals.    Anticipated barriers to physical therapy: Age and injury    Goals: Short Term Goals: 4 weeks   1.  Patient will demonstrate left eversion MMT  > 4/5 degree to improve gait pattern. MET  2.  Patient will demonstrate left quad strength via MicroFET > 20 kg for improved functional mobility. MET  3.  Patient will report no increase in pain ambulating household distances in shoe without a device.  MET     Long Term Goals: 12 weeks   1.  Patient will demonstrate improved ankle stability by performing single leg stance for 15 seconds. PROGRESSING; NOT MET  2.  Patient will demonstrate ability to ambulate at least 900 ft in shoe in 6 minutes for improved endurance. PROGRESSING; NOT MET  3.  Patient will demonstrate to PT comprehensive understanding of each HEP component without verbal cueing.  PROGRESSING; NOT MET  4.  Patient will improve FOTO to < 38% to improve ADL performance. PROGRESSING; NOT MET    Plan     Cont per POC. Concentrate on post tib and peroneal activation as well as quad strength    Plan of care Certification: 4/19/2023 to 6/14/23.    Justyn Pop, PT,

## 2023-05-08 ENCOUNTER — CLINICAL SUPPORT (OUTPATIENT)
Dept: REHABILITATION | Facility: HOSPITAL | Age: 63
End: 2023-05-08
Payer: MEDICARE

## 2023-05-08 DIAGNOSIS — Z74.09 DECREASED FUNCTIONAL MOBILITY AND ENDURANCE: Primary | ICD-10-CM

## 2023-05-08 DIAGNOSIS — R29.898 ANKLE WEAKNESS: ICD-10-CM

## 2023-05-08 PROCEDURE — 97110 THERAPEUTIC EXERCISES: CPT | Mod: PN | Performed by: PHYSICAL THERAPIST

## 2023-05-08 NOTE — PROGRESS NOTES
"                              Physical Therapy Daily Treatment Note     [facility]  [id number]    Therapy Diagnosis:   Encounter Diagnoses   Name Primary?    Decreased functional mobility and endurance Yes    Ankle weakness        Physician: Ana Abdi PA-C    Visit Date: 5/8/2023       Physician Orders: PT Eval and Treat   Wean out of the boot as tolerated into tennis shoe.   Medical Diagnosis from Referral:   S86.012A (ICD-10-CM) - Rupture of left Achilles tendon, initial encounter   M25.572 (ICD-10-CM) - Acute left ankle pain      Evaluation Date: 4/19/2023  Authorization Period Expiration: 4/17/24  Plan of Care Expiration: 6/14/23  Progress Note Due: 6/14/23  Visit # / Visits authorized: 6/20 (+1)  FOTO: 2/ 5 PERFORMED 5/1/23  PTA visit: 0/5     Precautions: Standard; Wean out of the boot as tolerated into tennis shoe.      Time In: 11:00 am  Time Out: 11:58 am  Total Appointment Time (timed & untimed codes): 14 1:1 minutes (1 TE)       Subjective     Pt reports: her ankle swells, but otherwise no pain.   She was compliant with home exercise program.  Response to previous treatment: minimal lateral ankle sore  Functional change: improving gait    Pain: 0/10  Location: left ankles and feet      Objective     5/3/23  quad MicroFET: right: 28.5 kg, left 28 kg  Left eversion MMT: 4+/5     Maria M received therapeutic exercises to develop strength, endurance, ROM, flexibility, posture, and core stabilization for 6 1:1 and 16 supervised minutes including:    Theraband: 4 ways 30x2": Green theraband      Side lying eversion 10x10"; 10x10" 2 lbs  Side lying inversion 20x10" 2 pounds     Heel walking: 3 laps in // bars no upper extremity        Maria M participated in neuromuscular re-education activities to improve: Balance, Coordination, Proprioception, and Posture for 4 1:1 and 8 supervised minutes. The following activities were included:     Step to BOSU (round side up): left 30x " "with 2" holds (focusing on neutral foot position)  Toe taps Cone x20 fwd, cross midline (standing on left)  Single leg stance: 5x10" left (minimal upper extremity use)       therapeutic activities to improve functional performance for 4 1:1 and 16 supervised  minutes, including:     Step up to 2nd step contralateral tap: @ 1st step (Up L, Down R) 3x10 no upper extremity   Mini Squats 3x10   Standing posterior tibialis heel raises: 3x10 (with tennis ball)  Resisted forward walk @ cable column: 7 pounds each 10x (cues to spread feet apart)  Resisted backwards walk @ cable column: 7 pounds each 10x      Home Exercises Provided and Patient Education Provided     Education provided:   - anatomy  - importance of home exercise program compliance  - attendance policy  - benefits of compression socks for swelling.     Written Home Exercises Provided: yes. Exercises were reviewed and Maria M was able to demonstrate them prior to the end of the session.  Maria M demonstrated good  understanding of the education provided. See EMR under Patient Instructions for exercises provided during therapy sessions.    Assessment     Maria M is a 62 y.o. female referred to outpatient Physical Therapy with a medical diagnosis of left achilles rupture. Pain remains minimal to none. Palpable divot from achilles rupture present. Improved single leg stance with ability to perform for 10 seconds today multiple times without upper extremity use. Also able to complete heel walking without upper extremity use needed.     Maria M Is progressing well towards her goals.   Pt prognosis is Good.     Pt will continue to benefit from skilled outpatient physical therapy to address the deficits listed in the problem list box on initial evaluation, provide pt/family education and to maximize pt's level of independence in the home and community environment.     Pt's spiritual, cultural and educational needs considered and pt agreeable to plan of care and " goals.    Anticipated barriers to physical therapy: Age and injury    Goals: Short Term Goals: 4 weeks   1.  Patient will demonstrate left eversion MMT  > 4/5 degree to improve gait pattern. MET  2.  Patient will demonstrate left quad strength via MicroFET > 20 kg for improved functional mobility. MET  3.  Patient will report no increase in pain ambulating household distances in shoe without a device.  MET     Long Term Goals: 12 weeks   1.  Patient will demonstrate improved ankle stability by performing single leg stance for 15 seconds. PROGRESSING; NOT MET  2.  Patient will demonstrate ability to ambulate at least 900 ft in shoe in 6 minutes for improved endurance. PROGRESSING; NOT MET  3.  Patient will demonstrate to PT comprehensive understanding of each HEP component without verbal cueing.  PROGRESSING; NOT MET  4.  Patient will improve FOTO to < 38% to improve ADL performance. PROGRESSING; NOT MET    Plan     Cont per POC. Concentrate on post tib and peroneal activation as well as quad strength    Plan of care Certification: 4/19/2023 to 6/14/23.    Justyn Pop, PT,

## 2023-05-10 ENCOUNTER — CLINICAL SUPPORT (OUTPATIENT)
Dept: REHABILITATION | Facility: HOSPITAL | Age: 63
End: 2023-05-10
Payer: MEDICARE

## 2023-05-10 DIAGNOSIS — R29.898 ANKLE WEAKNESS: ICD-10-CM

## 2023-05-10 DIAGNOSIS — Z74.09 DECREASED FUNCTIONAL MOBILITY AND ENDURANCE: Primary | ICD-10-CM

## 2023-05-10 PROCEDURE — 97112 NEUROMUSCULAR REEDUCATION: CPT | Mod: PN | Performed by: PHYSICAL THERAPIST

## 2023-05-10 NOTE — PROGRESS NOTES
"                              Physical Therapy Daily Treatment Note     [facility]  [facility] Number: [id number]    Therapy Diagnosis:   Encounter Diagnoses   Name Primary?    Decreased functional mobility and endurance Yes    Ankle weakness      Physician: Ana Abdi PA-C    Visit Date: 5/10/2023       Physician Orders: PT Eval and Treat   Wean out of the boot as tolerated into tennis shoe.   Medical Diagnosis from Referral:   S86.012A (ICD-10-CM) - Rupture of left Achilles tendon, initial encounter   M25.572 (ICD-10-CM) - Acute left ankle pain      Evaluation Date: 4/19/2023  Authorization Period Expiration: 4/17/24  Plan of Care Expiration: 6/14/23  Progress Note Due: 6/14/23  Visit # / Visits authorized: 7/20 (+1)  FOTO: 2/ 5 PERFORMED 5/1/23  PTA visit: 0/5     Precautions: Standard; Wean out of the boot as tolerated into tennis shoe.      Time In: 11:00 am  Time Out: 11:58 am  Total Appointment Time (timed & untimed codes): 24 1:1 minutes (2 NMR)       Subjective     Pt reports: her ankle swells, but otherwise no pain.   She was compliant with home exercise program.  Response to previous treatment: minimal lateral ankle sore  Functional change: improving gait    Pain: 0/10  Location: left ankles and feet      Objective     5/3/23  quad MicroFET: right: 28.5 kg, left 28 kg  Left eversion MMT: 4+/5     Maria M received therapeutic exercises to develop strength, endurance, ROM, flexibility, posture, and core stabilization for 10 supervised minutes including:    Theraband: 4 ways 30x2": Green theraband      Side lying eversion 20x10" 2 lbs  Side lying inversion 20x10" 2 pounds     Heel walking: 3 laps in // bars no upper extremity        Maria M participated in neuromuscular re-education activities to improve: Balance, Coordination, Proprioception, and Posture for 24 1:1 and 8 supervised minutes. The following activities were included:     Step to BOSU (round side up): left 30x with 2" holds " "(focusing on neutral foot position)  Toe taps Cone x20 fwd, cross midline (standing on left)  Single leg stance: 5x10" left (minimal upper extremity use)    C sweeps: 2x10 each  Tandem walk in // bars: 4 laps no upper extremity   Single leg RDL: 10x each  Black theraband balance board: 1 minutes each in // bars     therapeutic activities to improve functional performance for 15 supervised  minutes, including:     Step up to 2nd step contralateral tap: @ 1st step (Up L, Down R) 3x10 no upper extremity   Mini Squats 3x10   Standing posterior tibialis heel raises: 3x10 (with tennis ball)  Resisted forward walk @ cable column: 7 pounds each 10x (cues to spread feet apart)  Resisted backwards walk @ cable column: 7 pounds each 10x          Home Exercises Provided and Patient Education Provided     Education provided:   - anatomy  - importance of home exercise program compliance  - attendance policy  - benefits of compression socks for swelling.     Written Home Exercises Provided: yes. Exercises were reviewed and Maria M was able to demonstrate them prior to the end of the session.  Maria M demonstrated good  understanding of the education provided. See EMR under Patient Instructions for exercises provided during therapy sessions.    Assessment     Maria M is a 62 y.o. female referred to outpatient Physical Therapy with a medical diagnosis of left achilles rupture. Pain remains minimal to none. Palpable divot from achilles rupture present. Difficulty with stability with RDL addition today. Continue to progress stability exercises. She continues to have no pain and minimal functional limitations.    Maria M Is progressing well towards her goals.   Pt prognosis is Good.     Pt will continue to benefit from skilled outpatient physical therapy to address the deficits listed in the problem list box on initial evaluation, provide pt/family education and to maximize pt's level of independence in the home and community " environment.     Pt's spiritual, cultural and educational needs considered and pt agreeable to plan of care and goals.    Anticipated barriers to physical therapy: Age and injury    Goals: Short Term Goals: 4 weeks   1.  Patient will demonstrate left eversion MMT  > 4/5 degree to improve gait pattern. MET  2.  Patient will demonstrate left quad strength via MicroFET > 20 kg for improved functional mobility. MET  3.  Patient will report no increase in pain ambulating household distances in shoe without a device.  MET     Long Term Goals: 12 weeks   1.  Patient will demonstrate improved ankle stability by performing single leg stance for 15 seconds. PROGRESSING; NOT MET  2.  Patient will demonstrate ability to ambulate at least 900 ft in shoe in 6 minutes for improved endurance. PROGRESSING; NOT MET  3.  Patient will demonstrate to PT comprehensive understanding of each HEP component without verbal cueing.  PROGRESSING; NOT MET  4.  Patient will improve FOTO to < 38% to improve ADL performance. PROGRESSING; NOT MET    Plan     Cont per POC. Concentrate on post tib and peroneal activation as well as quad strength  Decrease to 1x per wk for next 2 weeks, then consider discharge.     Plan of care Certification: 4/19/2023 to 6/14/23.    Justyn Pop, PT,

## 2023-05-24 ENCOUNTER — TELEPHONE (OUTPATIENT)
Dept: REHABILITATION | Facility: HOSPITAL | Age: 63
End: 2023-05-24
Payer: MEDICARE

## 2023-05-24 NOTE — TELEPHONE ENCOUNTER
Called regarding missed appointments. Patient had previously cancelled due to a dental procedure, but had a bad reaction causing severe pain causing her to miss more time. Plan is to cancel next appointment to have planned discharge on 5/31/23 at 10 am.

## 2023-05-31 ENCOUNTER — CLINICAL SUPPORT (OUTPATIENT)
Dept: REHABILITATION | Facility: HOSPITAL | Age: 63
End: 2023-05-31
Payer: MEDICARE

## 2023-05-31 DIAGNOSIS — R29.898 ANKLE WEAKNESS: ICD-10-CM

## 2023-05-31 DIAGNOSIS — Z74.09 DECREASED FUNCTIONAL MOBILITY AND ENDURANCE: Primary | ICD-10-CM

## 2023-05-31 PROCEDURE — 97110 THERAPEUTIC EXERCISES: CPT | Mod: PN | Performed by: PHYSICAL THERAPIST

## 2023-05-31 NOTE — PROGRESS NOTES
Physical Therapy  DISCHARGE Note     Name: Maria M ESTEVEZ [facility] Number: [id number]    Therapy Diagnosis:   Encounter Diagnoses   Name Primary?    Decreased functional mobility and endurance Yes    Ankle weakness      Physician: Ana Abdi PA-C    Visit Date: 5/31/2023       Physician Orders: PT Eval and Treat   Wean out of the boot as tolerated into tennis shoe.   Medical Diagnosis from Referral:   S86.012A (ICD-10-CM) - Rupture of left Achilles tendon, initial encounter   M25.572 (ICD-10-CM) - Acute left ankle pain      Evaluation Date: 4/19/2023  Authorization Period Expiration: 4/17/24  Plan of Care Expiration: 6/14/23  Progress Note Due: 6/14/23  Visit # / Visits authorized: 7/20 (+1)  FOTO: 2/ 5 PERFORMED today  PTA visit: 0/5     Precautions: Standard; Wean out of the boot as tolerated into tennis shoe.      Time In: 10:01 am  Time Out: 10:35 am  Total Appointment Time (timed & untimed codes): 30 1:1 minutes (2 TE)       Subjective     Pt reports: her ankle swells, but otherwise no pain.   She was compliant with home exercise program.  Response to previous treatment: minimal lateral ankle sore  Functional change: improving gait    Pain: 0/10  Location: left ankles and feet      Objective     5/31/23     () = new measurement on 5/31/23    Ankle   Right     Left   Pain/Dysfunction with Movement     AROM PROM MMT AROM PROM MMT WNL   Great toe (45/70 deg)                 Plantarflexion (50 deg) 50     50 (60)         Dorsiflexion (20 deg) 18   5/5 14 (16) (20)  4/5 (5/5)     Inversion (20-30 deg) 40   5/5 44   4-/5 (5/5)     Eversion (5-10 deg) 22   5/5 12 (22)   4-/5 (5/5)           Lower Extremity Strength                 LE           Right           Left 5/31/23   Hip flexion: 10.9 kg 7.8 kg 14.5 kg   Knee flexion 15.4 13.2 21.1 kg   Knee extension 22.5 kg 17.1 kg 33.8 kg      6 minute walk test: 1,260 ft               Maria M received therapeutic exercises to develop  strength, endurance, ROM, flexibility, posture, and core stabilization for 10 supervised minutes including:    Objective measures  Education on continuing home exercise program      Home Exercises Provided and Patient Education Provided     Education provided:   - anatomy  - importance of home exercise program compliance  - attendance policy  - benefits of compression socks for swelling.     Written Home Exercises Provided: yes. Exercises were reviewed and Maria M was able to demonstrate them prior to the end of the session.  Maria M demonstrated good  understanding of the education provided. See EMR under Patient Instructions for exercises provided during therapy sessions.    Assessment     Maria M is a 62 y.o. female referred to outpatient Physical Therapy with a medical diagnosis of left achilles rupture. Pain remains minimal to none. Palpable divot from achilles rupture present. She has progressed very well and has met all goals other than SLS. Gait with only mild decreased push off as expected with achilles rupture. Improved ankle strength, as well as hip strength. Demonstrates good functional movement patterns with ADLs. Single leg stance still limited. She's been compliant with home exercise program and is appropriate and agreeable to discharge.     Maria M Is progressing well towards her goals.   Pt prognosis is Good.     Pt will continue to benefit from skilled outpatient physical therapy to address the deficits listed in the problem list box on initial evaluation, provide pt/family education and to maximize pt's level of independence in the home and community environment.     Pt's spiritual, cultural and educational needs considered and pt agreeable to plan of care and goals.    Anticipated barriers to physical therapy: Age and injury    Goals: Short Term Goals: 4 weeks   1.  Patient will demonstrate left eversion MMT  > 4/5 degree to improve gait pattern. MET  2.  Patient will demonstrate left quad  strength via MicroFET > 20 kg for improved functional mobility. MET  3.  Patient will report no increase in pain ambulating household distances in shoe without a device.  MET     Long Term Goals: 12 weeks   1.  Patient will demonstrate improved ankle stability by performing single leg stance for 15 seconds. NOT MET  2.  Patient will demonstrate ability to ambulate at least 900 ft in shoe in 6 minutes for improved endurance. MET  3.  Patient will demonstrate to PT comprehensive understanding of each HEP component without verbal cueing.  MET  4.  Patient will improve FOTO to < 38% to improve ADL performance. MET    Plan     DC     Justyn Pop, PT,

## 2023-06-07 RX ORDER — DICLOFENAC SODIUM 75 MG/1
TABLET, DELAYED RELEASE ORAL
Qty: 60 TABLET | Refills: 0 | Status: SHIPPED | OUTPATIENT
Start: 2023-06-07 | End: 2023-07-24

## 2023-06-21 NOTE — TELEPHONE ENCOUNTER
No care due was identified.  [facility] Embedded Care Due Messages. Reference number: [id number].   6/21/2023 7:03:18 AM CDT

## 2023-06-22 RX ORDER — ATORVASTATIN CALCIUM 40 MG/1
TABLET, FILM COATED ORAL
Qty: 90 TABLET | Refills: 1 | Status: SHIPPED | OUTPATIENT
Start: 2023-06-22

## 2023-07-03 RX ORDER — HYDROXYZINE PAMOATE 25 MG/1
CAPSULE ORAL
Qty: 30 CAPSULE | Refills: 2 | Status: SHIPPED | OUTPATIENT
Start: 2023-07-03 | End: 2023-08-09

## 2023-07-24 RX ORDER — DICLOFENAC SODIUM 75 MG/1
TABLET, DELAYED RELEASE ORAL
Qty: 60 TABLET | Refills: 0 | Status: SHIPPED | OUTPATIENT
Start: 2023-07-24 | End: 2023-09-07

## 2023-08-09 ENCOUNTER — OFFICE VISIT (OUTPATIENT)
Dept: FAMILY MEDICINE | Facility: CLINIC | Age: 63
End: 2023-08-09
Payer: MEDICARE

## 2023-08-09 VITALS
HEART RATE: 65 BPM | WEIGHT: 214.31 LBS | BODY MASS INDEX: 33.64 KG/M2 | OXYGEN SATURATION: 98 % | SYSTOLIC BLOOD PRESSURE: 108 MMHG | DIASTOLIC BLOOD PRESSURE: 62 MMHG | HEIGHT: 67 IN

## 2023-08-09 DIAGNOSIS — N18.31 CHRONIC KIDNEY DISEASE, STAGE 3A: ICD-10-CM

## 2023-08-09 DIAGNOSIS — Z12.31 ENCOUNTER FOR SCREENING MAMMOGRAM FOR MALIGNANT NEOPLASM OF BREAST: ICD-10-CM

## 2023-08-09 DIAGNOSIS — Z87.891 PERSONAL HISTORY OF NICOTINE DEPENDENCE: ICD-10-CM

## 2023-08-09 DIAGNOSIS — Z23 NEED FOR SHINGLES VACCINE: ICD-10-CM

## 2023-08-09 DIAGNOSIS — M15.9 OSTEOARTHRITIS OF MULTIPLE JOINTS, UNSPECIFIED OSTEOARTHRITIS TYPE: ICD-10-CM

## 2023-08-09 DIAGNOSIS — E78.2 MIXED HYPERLIPIDEMIA: ICD-10-CM

## 2023-08-09 DIAGNOSIS — Z86.73 HISTORY OF CVA (CEREBROVASCULAR ACCIDENT): ICD-10-CM

## 2023-08-09 DIAGNOSIS — G89.29 CHRONIC RIGHT-SIDED LOW BACK PAIN WITH SCIATICA, SCIATICA LATERALITY UNSPECIFIED: ICD-10-CM

## 2023-08-09 DIAGNOSIS — M54.40 CHRONIC RIGHT-SIDED LOW BACK PAIN WITH SCIATICA, SCIATICA LATERALITY UNSPECIFIED: ICD-10-CM

## 2023-08-09 DIAGNOSIS — G45.9 TIA (TRANSIENT ISCHEMIC ATTACK): ICD-10-CM

## 2023-08-09 DIAGNOSIS — G47.00 INSOMNIA, UNSPECIFIED TYPE: ICD-10-CM

## 2023-08-09 DIAGNOSIS — R73.03 PREDIABETES: Primary | ICD-10-CM

## 2023-08-09 PROCEDURE — 3078F DIAST BP <80 MM HG: CPT | Mod: CPTII,S$GLB,, | Performed by: INTERNAL MEDICINE

## 2023-08-09 PROCEDURE — 99999 PR PBB SHADOW E&M-EST. PATIENT-LVL IV: ICD-10-PCS | Mod: PBBFAC,,, | Performed by: INTERNAL MEDICINE

## 2023-08-09 PROCEDURE — 3078F PR MOST RECENT DIASTOLIC BLOOD PRESSURE < 80 MM HG: ICD-10-PCS | Mod: CPTII,S$GLB,, | Performed by: INTERNAL MEDICINE

## 2023-08-09 PROCEDURE — G0009 PNEUMOCOCCAL CONJUGATE VACCINE 20-VALENT: ICD-10-PCS | Mod: S$GLB,,, | Performed by: INTERNAL MEDICINE

## 2023-08-09 PROCEDURE — 90677 PCV20 VACCINE IM: CPT | Mod: S$GLB,,, | Performed by: INTERNAL MEDICINE

## 2023-08-09 PROCEDURE — 99214 OFFICE O/P EST MOD 30 MIN: CPT | Mod: S$GLB,,, | Performed by: INTERNAL MEDICINE

## 2023-08-09 PROCEDURE — 3008F PR BODY MASS INDEX (BMI) DOCUMENTED: ICD-10-PCS | Mod: CPTII,S$GLB,, | Performed by: INTERNAL MEDICINE

## 2023-08-09 PROCEDURE — G0009 ADMIN PNEUMOCOCCAL VACCINE: HCPCS | Mod: S$GLB,,, | Performed by: INTERNAL MEDICINE

## 2023-08-09 PROCEDURE — 3074F SYST BP LT 130 MM HG: CPT | Mod: CPTII,S$GLB,, | Performed by: INTERNAL MEDICINE

## 2023-08-09 PROCEDURE — 1160F RVW MEDS BY RX/DR IN RCRD: CPT | Mod: CPTII,S$GLB,, | Performed by: INTERNAL MEDICINE

## 2023-08-09 PROCEDURE — 1160F PR REVIEW ALL MEDS BY PRESCRIBER/CLIN PHARMACIST DOCUMENTED: ICD-10-PCS | Mod: CPTII,S$GLB,, | Performed by: INTERNAL MEDICINE

## 2023-08-09 PROCEDURE — 99999 PR PBB SHADOW E&M-EST. PATIENT-LVL IV: CPT | Mod: PBBFAC,,, | Performed by: INTERNAL MEDICINE

## 2023-08-09 PROCEDURE — 3008F BODY MASS INDEX DOCD: CPT | Mod: CPTII,S$GLB,, | Performed by: INTERNAL MEDICINE

## 2023-08-09 PROCEDURE — 99214 PR OFFICE/OUTPT VISIT, EST, LEVL IV, 30-39 MIN: ICD-10-PCS | Mod: S$GLB,,, | Performed by: INTERNAL MEDICINE

## 2023-08-09 PROCEDURE — 90677 PNEUMOCOCCAL CONJUGATE VACCINE 20-VALENT: ICD-10-PCS | Mod: S$GLB,,, | Performed by: INTERNAL MEDICINE

## 2023-08-09 PROCEDURE — 3044F PR MOST RECENT HEMOGLOBIN A1C LEVEL <7.0%: ICD-10-PCS | Mod: CPTII,S$GLB,, | Performed by: INTERNAL MEDICINE

## 2023-08-09 PROCEDURE — 1159F PR MEDICATION LIST DOCUMENTED IN MEDICAL RECORD: ICD-10-PCS | Mod: CPTII,S$GLB,, | Performed by: INTERNAL MEDICINE

## 2023-08-09 PROCEDURE — 3044F HG A1C LEVEL LT 7.0%: CPT | Mod: CPTII,S$GLB,, | Performed by: INTERNAL MEDICINE

## 2023-08-09 PROCEDURE — 1159F MED LIST DOCD IN RCRD: CPT | Mod: CPTII,S$GLB,, | Performed by: INTERNAL MEDICINE

## 2023-08-09 PROCEDURE — 3074F PR MOST RECENT SYSTOLIC BLOOD PRESSURE < 130 MM HG: ICD-10-PCS | Mod: CPTII,S$GLB,, | Performed by: INTERNAL MEDICINE

## 2023-08-09 RX ORDER — ZOSTER VACCINE RECOMBINANT, ADJUVANTED 50 MCG/0.5
0.5 KIT INTRAMUSCULAR ONCE
Qty: 1 EACH | Refills: 1 | Status: SHIPPED | OUTPATIENT
Start: 2023-08-09 | End: 2024-02-01

## 2023-08-09 RX ORDER — CYCLOBENZAPRINE HCL 5 MG
5 TABLET ORAL 2 TIMES DAILY PRN
Qty: 90 TABLET | Refills: 3 | Status: SHIPPED | OUTPATIENT
Start: 2023-08-09

## 2023-08-09 RX ORDER — GABAPENTIN 300 MG/1
300 CAPSULE ORAL NIGHTLY
Qty: 90 CAPSULE | Refills: 3 | Status: SHIPPED | OUTPATIENT
Start: 2023-08-09 | End: 2024-08-08

## 2023-08-09 RX ORDER — BUPROPION HYDROCHLORIDE 150 MG/1
300 TABLET ORAL DAILY
Qty: 180 TABLET | Refills: 3 | Status: SHIPPED | OUTPATIENT
Start: 2023-08-09 | End: 2023-12-05 | Stop reason: SDUPTHER

## 2023-08-09 RX ORDER — TRAZODONE HYDROCHLORIDE 50 MG/1
50 TABLET ORAL NIGHTLY
Qty: 90 TABLET | Refills: 3 | Status: SHIPPED | OUTPATIENT
Start: 2023-08-09 | End: 2024-08-08

## 2023-08-09 RX ORDER — ZOSTER VACCINE RECOMBINANT, ADJUVANTED 50 MCG/0.5
0.5 KIT INTRAMUSCULAR ONCE
Qty: 1 EACH | Refills: 0 | Status: SHIPPED | OUTPATIENT
Start: 2023-08-09 | End: 2023-08-09

## 2023-08-09 NOTE — PROGRESS NOTES
Subjective:       Patient ID: Maria M Mei is a 63 y.o. female.    Chief Complaint: Follow-up (6 month )      HPI  Maria M Mei is a 63 y.o. female with  acute MI, and recent an embolic stroke, smoking, and found with PFO later having closure  who presents today for routine follow-up who presents today for Follow-up (6 month )    Patient reports after our last visit had a torn Achilles tendon. Decided on conservative, started exercises on her own, and management and completed PT. ROM has been preserved, has occasional heel pain, and walking without difficulty.    With the decrease activity has gained some weight but lately watching her diet and losing a few lb.  Has been thirsty with no changes in urine.    From our last visit the gabapentin 100 mg did not do much.  Tried her daughter's 400 mg gabapentin and work much better for her back pain and sciatica pain.    Vistaril is not helping her sleep and will like to try trazodone as is also helping her daughter.    Continues to decrease smoking and she is to 3 cigarettes a day.  She is on Wellbutrin and feels her mood is stable.    Due for mammogram and low-dose lung CT scan.    Health Maintenance:  Health Maintenance   Topic Date Due    TETANUS VACCINE  09/30/2015    Mammogram  08/22/2023    High Dose Statin  06/22/2024    Lipid Panel  02/10/2028    Hepatitis C Screening  Completed       Review of Systems   Constitutional: Negative.  Negative for fever and unexpected weight change.   HENT: Negative.     Respiratory: Negative.     Cardiovascular: Negative.    Gastrointestinal: Negative.  Positive for reflux.   Genitourinary:  Negative for difficulty urinating.   Musculoskeletal:  Positive for back pain and leg pain.   Integumentary:  Negative.   Neurological: Negative.    Psychiatric/Behavioral:  Positive for sleep disturbance.       Past Medical History:   Diagnosis Date    COVID-19 12/2020    Hyperlipidemia     MI (myocardial infarction)     Stroke         Past Surgical History:   Procedure Laterality Date    ANKLE SURGERY      CHOLECYSTECTOMY         Family History   Problem Relation Age of Onset    Obesity Mother     Cancer Mother     Diabetes Mother     Hypertension Mother     Lung cancer Mother     Hypertension Father     Stroke Father     Heart disease Father     Obesity Sister     Hyperlipidemia Sister     Hypertension Sister     Arthritis Sister     Obesity Brother     Drug abuse Brother     Diabetes Brother     Heart disease Brother     Diabetes Daughter     Bipolar disorder Daughter     Obesity Daughter     No Known Problems Maternal Grandmother     No Known Problems Maternal Grandfather     No Known Problems Paternal Grandmother     No Known Problems Paternal Grandfather        Social History     Socioeconomic History    Marital status: Single   Tobacco Use    Smoking status: Every Day     Current packs/day: 0.50     Average packs/day: 0.5 packs/day for 36.6 years (18.3 ttl pk-yrs)     Types: Cigarettes     Start date: 1987    Smokeless tobacco: Never    Tobacco comments:     quit for 2 years in the middle; weaned self down, now 3 / day   Substance and Sexual Activity    Alcohol use: Yes     Alcohol/week: 1.0 standard drink of alcohol     Types: 1 Standard drinks or equivalent per week     Comment: Socially    Drug use: No    Sexual activity: Not Currently     Social Determinants of Health     Financial Resource Strain: Low Risk  (4/24/2023)    Overall Financial Resource Strain (CARDIA)     Difficulty of Paying Living Expenses: Not hard at all   Food Insecurity: No Food Insecurity (4/24/2023)    Hunger Vital Sign     Worried About Running Out of Food in the Last Year: Never true     Ran Out of Food in the Last Year: Never true   Transportation Needs: No Transportation Needs (4/24/2023)    PRAPARE - Transportation     Lack of Transportation (Medical): No     Lack of Transportation (Non-Medical): No   Physical Activity: Insufficiently Active (4/24/2023)     Exercise Vital Sign     Days of Exercise per Week: 2 days     Minutes of Exercise per Session: 60 min   Stress: No Stress Concern Present (4/24/2023)    [employer] [place] [employer] - Occupational Stress Questionnaire     Feeling of Stress : Only a little   Social Connections: Moderately Integrated (4/24/2023)    Social Connection and Isolation Panel [NHANES]     Frequency of Communication with Friends and Family: More than three times a week     Frequency of Social Gatherings with Friends and Family: Once a week     Attends Sabianist Services: More than 4 times per year     Active Member of Clubs or Organizations: Yes     Attends Club or Organization Meetings: More than 4 times per year     Marital Status:    Housing Stability: Low Risk  (4/24/2023)    Housing Stability Vital Sign     Unable to Pay for Housing in the Last Year: No     Number of Places Lived in the Last Year: 1     Unstable Housing in the Last Year: No       Current Outpatient Medications   Medication Sig Dispense Refill    albuterol (VENTOLIN HFA) 90 mcg/actuation inhaler Inhale 2 puffs into the lungs every 6 (six) hours as needed for Wheezing or Shortness of Breath.  Rescue 18 g 0    ascorbic acid, vitamin C, (VITAMIN C) 250 MG tablet Take 250 mg by mouth once daily.      aspirin 81 MG Chew Chew and swallow 1 tablet (81 mg total) by mouth once daily. 30 tablet 0    atorvastatin (LIPITOR) 40 MG tablet TAKE 1 TABLET EVERY DAY 90 tablet 1    buPROPion (WELLBUTRIN XL) 150 MG TB24 tablet Take 2 tablets (300 mg total) by mouth once daily. 180 tablet 3    cyclobenzaprine (FLEXERIL) 5 MG tablet TAKE 1 TABLET (5 MG TOTAL) BY MOUTH 2 (TWO) TIMES DAILY AS NEEDED. 30 tablet 2    diclofenac (VOLTAREN) 75 MG EC tablet TAKE 1 TABLET TWICE DAILY AS NEEDED FOR PAIN 60 tablet 0    gabapentin (NEURONTIN) 100 MG capsule Take 1 capsule (100 mg total) by mouth every evening. 30 capsule 11    hydrOXYzine pamoate (VISTARIL) 25 MG Cap TAKE 1 TABLET  EVERY NIGHT AS NEEDED FOR SLEEP 30 capsule 2    nicotine (NICODERM CQ) 21 mg/24 hr Place 1 patch onto the skin once daily. 28 patch 0    nicotine, polacrilex, (NICORETTE) 2 mg Gum Take 1 each (2 mg total) by mouth as needed (as needed). 100 each 0    pantoprazole (PROTONIX) 40 MG tablet TAKE 1 TABLET ONE TIME DAILY 90 tablet 3    vitamin D (VITAMIN D3) 1000 units Tab Take 1,000 Units by mouth once daily.      zinc gluconate 50 mg tablet Take 50 mg by mouth once daily.       No current facility-administered medications for this visit.       Review of patient's allergies indicates:   Allergen Reactions    Codeine Itching         Objective:       Last 3 sets of Vitals    Vitals - 1 value per visit 4/24/2023 8/9/2023 8/9/2023   SYSTOLIC 136 - -   DIASTOLIC 58 - -   Pulse - - -   Temp - - -   Resp - - -   SPO2 - - -   Weight (lb) - - 214.29   Weight (kg) - - 97.2   Height - - 67   BMI (Calculated) - - 33.6   VISIT REPORT [id number]  [id number]; 17NONCRENCREPNotFromCR  [id number]; 17NONCRENCREPNotFromCR  [id number];   Pain Score  - 6 -   Physical Exam  Constitutional:       General: She is not in acute distress.  HENT:      Head: Normocephalic.      Right Ear: Tympanic membrane, ear canal and external ear normal.      Left Ear: Tympanic membrane, ear canal and external ear normal.      Nose: Nose normal.      Mouth/Throat:      Mouth: Mucous membranes are moist.   Eyes:      General: No scleral icterus.     Extraocular Movements: Extraocular movements intact.      Conjunctiva/sclera: Conjunctivae normal.   Neck:      Vascular: No carotid bruit.      Comments: No goiter.  Cardiovascular:      Rate and Rhythm: Normal rate and regular rhythm.      Pulses: Normal pulses.      Heart sounds: Normal heart sounds.   Pulmonary:      Effort: Pulmonary effort is normal.      Breath sounds: Normal breath sounds.   Abdominal:      General: Bowel sounds are normal. There is no distension.      Palpations: Abdomen  is soft. There is no mass.      Tenderness: There is no abdominal tenderness.   Musculoskeletal:         General: Deformity (of left achilles tendon) present. No swelling.   Lymphadenopathy:      Cervical: No cervical adenopathy.   Skin:     General: Skin is warm and dry.   Neurological:      General: No focal deficit present.      Mental Status: She is alert and oriented to person, place, and time.   Psychiatric:         Mood and Affect: Mood normal.         Behavior: Behavior normal.           CBC:  Recent Labs   Lab 10/11/21  0648 08/26/22  0815 02/10/23  0925   WBC 8.34 8.09 7.35   RBC 4.28 4.40 4.72   Hemoglobin 13.3 13.5 14.1   Hematocrit 38.9 40.2 43.2   Platelets 284 316 287   MCV 91 91 92   MCH 31.1 H 30.7 29.9   MCHC 34.2 33.6 32.6     CMP:  Recent Labs   Lab 02/28/22  0739 08/26/22  0815 02/10/23  0925   Glucose 103 103 100   Calcium 9.3 9.3 9.6   Albumin 3.5 3.8 3.9   Total Protein 6.6 6.9 7.2   Sodium 142 143 141   Potassium 4.4 3.9 4.5   CO2 26 24 26   Chloride 111 H 108 107   BUN 16 20 25 H   Creatinine 1.1 0.9 1.1   Alkaline Phosphatase 89 114 112   ALT 19 30 29   AST 26 29 23   Total Bilirubin 0.4 0.5 0.3     URINALYSIS:  Recent Labs   Lab 05/23/21  1114   Color, UA Yellow   Specific Gravity, UA >=1.030 A   pH, UA 5.0   Protein, UA Negative   Bacteria Rare   Nitrite, UA Negative   Leukocytes, UA Negative      LIPIDS:  Recent Labs   Lab 05/23/21  0535 06/26/21  0952 11/16/21  0705 02/28/22  0739 08/26/22  0815 02/10/23  0925   TSH 3.703  --  2.282  --   --  2.054   HDL 25 L   < > 42 41 36 L 33 L   Cholesterol 216 H   < > 182 134 144 155   Triglycerides 304 H   < > 134 70 100 92   LDL Cholesterol 130.2   < > 113.2 79.0 88.0 103.6   HDL/Cholesterol Ratio 11.6 L   < > 23.1 30.6 25.0 21.3   Non-HDL Cholesterol 191   < > 140 93 108 122   Total Cholesterol/HDL Ratio 8.6 H   < > 4.3 3.3 4.0 4.7    < > = values in this interval not displayed.     TSH:  Recent Labs   Lab 05/23/21  0535 11/16/21  0705  02/10/23  0925   TSH 3.703 2.282 2.054       A1C:  Recent Labs   Lab 05/23/21  0535 06/26/21  0952 11/16/21  0705 02/28/22  0739 08/26/22  0815 02/10/23  0925   Hemoglobin A1C 5.4 5.6 5.7 H 5.6 5.8 H 5.8 H       Imaging:  X-Ray Ankle Complete Left  Narrative: EXAMINATION:  XR ANKLE COMPLETE 3 VIEW LEFT    CLINICAL HISTORY:  Unspecified injury of unspecified ankle, initial encounter    TECHNIQUE:  AP, lateral and oblique views of the left ankle were performed.    COMPARISON:  None    FINDINGS:  There is no appreciable acute fracture or dislocation involving the left ankle.  Ankle mortise is well maintained.  There is osteophyte formation along the ankle joint.  Plantar and dorsal small calcaneal enthesophytes are also noted.  No radiopaque foreign bodies are noted in the soft tissues allowing for incidental superficial well-circumscribed calcifications dorsal to the Achilles tendon.  Impression: No evidence of acute abnormality.    Incidental nonacute findings as above.    Electronically signed by: Maricel Enamorado  Date:    02/28/2023  Time:    23:47      Assessment:       1. Prediabetes    2. TIA (transient ischemic attack)    3. History of CVA (cerebrovascular accident)    4. Mixed hyperlipidemia    5. Chronic kidney disease, stage 3a    6. Insomnia, unspecified type    7. Chronic right-sided low back pain with sciatica, sciatica laterality unspecified    8. Osteoarthritis of multiple joints, unspecified osteoarthritis type    9. Personal history of nicotine dependence    10. Need for shingles vaccine    11. Encounter for screening mammogram for malignant neoplasm of breast            Plan:       1. Prediabetes  -     Comprehensive Metabolic Panel; Future; Expected date: 08/09/2023  -     Hemoglobin A1C; Future; Expected date: 08/09/2023  -     TSH; Future; Expected date: 08/09/2023  - would be interested in GLP 1 agonist if glucose increases.  Concern of GI side effects of metformin.    2. TIA (transient ischemic  attack)  -     Lipid Panel; Future; Expected date: 08/09/2023  - on aspirin and atorvastatin  - smoking cessation encouraged    3. History of CVA (cerebrovascular accident)  -     Lipid Panel; Future; Expected date: 08/09/2023  - as above    4. Mixed hyperlipidemia  -     Lipid Panel; Future; Expected date: 08/09/2023  - on atorvastatin    5. Chronic kidney disease, stage 3a  -     Comprehensive Metabolic Panel; Future; Expected date: 08/09/2023  - has been stable.  Not taking diuretics but anti-inflammatories.    6. Insomnia, unspecified type  -     try her daughter's trazodone and helped.  - traZODone (DESYREL) 50 MG tablet; Take 1 tablet (50 mg total) by mouth every evening.  Dispense: 90 tablet; Refill: 3    7. Chronic right-sided low back pain with sciatica, sciatica laterality unspecified  -     will increase gabapentin.  Hoping to need less anti-inflammatories.  - gabapentin (NEURONTIN) 300 MG capsule; Take 1 capsule (300 mg total) by mouth every evening.  Dispense: 90 capsule; Refill: 3    8. Osteoarthritis of multiple joints, unspecified osteoarthritis type  -     cyclobenzaprine (FLEXERIL) 5 MG tablet; Take 1 tablet (5 mg total) by mouth 2 (two) times daily as needed for Muscle spasms.  Dispense: 90 tablet; Refill: 3  - trying to lose weight.    9. Personal history of nicotine dependence  -     buPROPion (WELLBUTRIN XL) 150 MG TB24 tablet; Take 2 tablets (300 mg total) by mouth once daily.  Dispense: 180 tablet; Refill: 3  -     CT Chest Lung Screening Low Dose; Future; Expected date: 08/09/2023  - decreasing cigarettes, encouraged cessation    10. Need for shingles vaccine  -     Discontinue: varicella-zoster gE-AS01B, PF, (SHINGRIX, PF,) 50 mcg/0.5 mL injection; Inject 0.5 mLs into the muscle once. for 1 dose  Dispense: 1 each; Refill: 0  -     varicella-zoster gE-AS01B, PF, (SHINGRIX, PF,) 50 mcg/0.5 mL injection; Inject 0.5 mLs into the muscle once. for 1 dose  Dispense: 1 each; Refill: 1    11.  Encounter for screening mammogram for malignant neoplasm of breast  -     Mammo Digital Screening Bilat w/ Zac; Future; Expected date: 08/23/2023    Other orders  -     (In Office Administered) Pneumococcal Conjugate Vaccine (20 Valent) (IM)       Health Maintenance Due   Topic Date Due    COVID-19 Vaccine (1) Never done    Pneumococcal Vaccines (Age 0-64) (1 - PCV) Never done    Shingles Vaccine (1 of 2) Never done    TETANUS VACCINE  09/30/2015    Mammogram  08/22/2023            Return to clinic in 4 months.    Emelia Jones MD  [facility]  Disclaimer:  This note has been generated using voice-recognition software. There may be grammatical or spelling errors that have been missed during proof-reading

## 2023-08-10 ENCOUNTER — LAB VISIT (OUTPATIENT)
Dept: LAB | Facility: HOSPITAL | Age: 63
End: 2023-08-10
Attending: INTERNAL MEDICINE
Payer: MEDICARE

## 2023-08-10 DIAGNOSIS — E78.2 MIXED HYPERLIPIDEMIA: ICD-10-CM

## 2023-08-10 DIAGNOSIS — R73.03 PREDIABETES: ICD-10-CM

## 2023-08-10 LAB
ALBUMIN SERPL BCP-MCNC: 3.7 G/DL (ref 3.5–5.2)
ALP SERPL-CCNC: 118 U/L (ref 55–135)
ALT SERPL W/O P-5'-P-CCNC: 26 U/L (ref 10–44)
ANION GAP SERPL CALC-SCNC: 4 MMOL/L (ref 8–16)
AST SERPL-CCNC: 24 U/L (ref 10–40)
BILIRUB SERPL-MCNC: 0.6 MG/DL (ref 0.1–1)
BUN SERPL-MCNC: 26 MG/DL (ref 8–23)
CALCIUM SERPL-MCNC: 9.2 MG/DL (ref 8.7–10.5)
CHLORIDE SERPL-SCNC: 111 MMOL/L (ref 95–110)
CHOLEST SERPL-MCNC: 156 MG/DL (ref 120–199)
CHOLEST/HDLC SERPL: 4.9 {RATIO} (ref 2–5)
CO2 SERPL-SCNC: 26 MMOL/L (ref 23–29)
CREAT SERPL-MCNC: 1.1 MG/DL (ref 0.5–1.4)
EST. GFR  (NO RACE VARIABLE): 57 ML/MIN/1.73 M^2
ESTIMATED AVG GLUCOSE: 117 MG/DL (ref 68–131)
GLUCOSE SERPL-MCNC: 100 MG/DL (ref 70–110)
HBA1C MFR BLD: 5.7 % (ref 4–5.6)
HDLC SERPL-MCNC: 32 MG/DL (ref 40–75)
HDLC SERPL: 20.5 % (ref 20–50)
LDLC SERPL CALC-MCNC: 96.6 MG/DL (ref 63–159)
NONHDLC SERPL-MCNC: 124 MG/DL
POTASSIUM SERPL-SCNC: 4.1 MMOL/L (ref 3.5–5.1)
PROT SERPL-MCNC: 6.5 G/DL (ref 6–8.4)
SODIUM SERPL-SCNC: 141 MMOL/L (ref 136–145)
TRIGL SERPL-MCNC: 137 MG/DL (ref 30–150)
TSH SERPL DL<=0.005 MIU/L-ACNC: 2.88 UIU/ML (ref 0.4–4)

## 2023-08-10 PROCEDURE — 80053 COMPREHEN METABOLIC PANEL: CPT | Performed by: INTERNAL MEDICINE

## 2023-08-10 PROCEDURE — 36415 COLL VENOUS BLD VENIPUNCTURE: CPT | Performed by: INTERNAL MEDICINE

## 2023-08-10 PROCEDURE — 84443 ASSAY THYROID STIM HORMONE: CPT | Performed by: INTERNAL MEDICINE

## 2023-08-10 PROCEDURE — 80061 LIPID PANEL: CPT | Performed by: INTERNAL MEDICINE

## 2023-08-10 PROCEDURE — 83036 HEMOGLOBIN GLYCOSYLATED A1C: CPT | Performed by: INTERNAL MEDICINE

## 2023-08-11 PROBLEM — N18.31 CHRONIC KIDNEY DISEASE, STAGE 3A: Status: ACTIVE | Noted: 2023-08-11

## 2023-08-13 ENCOUNTER — PATIENT MESSAGE (OUTPATIENT)
Dept: FAMILY MEDICINE | Facility: CLINIC | Age: 63
End: 2023-08-13
Payer: MEDICARE

## 2023-08-13 DIAGNOSIS — J06.9 URTI (ACUTE UPPER RESPIRATORY INFECTION): Primary | ICD-10-CM

## 2023-08-24 ENCOUNTER — HOSPITAL ENCOUNTER (OUTPATIENT)
Dept: RADIOLOGY | Facility: HOSPITAL | Age: 63
Discharge: HOME | End: 2023-08-24
Attending: INTERNAL MEDICINE
Payer: MEDICARE

## 2023-08-24 DIAGNOSIS — Z12.31 ENCOUNTER FOR SCREENING MAMMOGRAM FOR MALIGNANT NEOPLASM OF BREAST: ICD-10-CM

## 2023-08-24 DIAGNOSIS — Z87.891 PERSONAL HISTORY OF NICOTINE DEPENDENCE: ICD-10-CM

## 2023-08-24 PROCEDURE — 71271 CT THORAX LUNG CANCER SCR C-: CPT | Mod: TC

## 2023-08-24 PROCEDURE — 77067 SCR MAMMO BI INCL CAD: CPT | Mod: 26,,, | Performed by: RADIOLOGY

## 2023-08-24 PROCEDURE — 71271 CT CHEST LUNG SCREENING LOW DOSE: ICD-10-PCS | Mod: 26,,, | Performed by: RADIOLOGY

## 2023-08-24 PROCEDURE — 77067 SCR MAMMO BI INCL CAD: CPT | Mod: TC

## 2023-08-24 PROCEDURE — 77067 MAMMO DIGITAL SCREENING BILAT WITH TOMO: ICD-10-PCS | Mod: 26,,, | Performed by: RADIOLOGY

## 2023-08-24 PROCEDURE — 77063 MAMMO DIGITAL SCREENING BILAT WITH TOMO: ICD-10-PCS | Mod: 26,,, | Performed by: RADIOLOGY

## 2023-08-24 PROCEDURE — 77063 BREAST TOMOSYNTHESIS BI: CPT | Mod: 26,,, | Performed by: RADIOLOGY

## 2023-08-24 PROCEDURE — 71271 CT THORAX LUNG CANCER SCR C-: CPT | Mod: 26,,, | Performed by: RADIOLOGY

## 2023-08-24 RX ORDER — FLUTICASONE PROPIONATE 50 MCG
2 SPRAY, SUSPENSION (ML) NASAL DAILY
Qty: 16 G | Refills: 2 | Status: SHIPPED | OUTPATIENT
Start: 2023-08-24

## 2023-08-26 ENCOUNTER — PATIENT MESSAGE (OUTPATIENT)
Dept: FAMILY MEDICINE | Facility: CLINIC | Age: 63
End: 2023-08-26
Payer: MEDICARE

## 2023-09-07 RX ORDER — DICLOFENAC SODIUM 75 MG/1
TABLET, DELAYED RELEASE ORAL
Qty: 60 TABLET | Refills: 0 | Status: SHIPPED | OUTPATIENT
Start: 2023-09-07 | End: 2023-10-23

## 2023-10-12 ENCOUNTER — PATIENT MESSAGE (OUTPATIENT)
Dept: FAMILY MEDICINE | Facility: CLINIC | Age: 63
End: 2023-10-12
Payer: MEDICARE

## 2023-10-23 RX ORDER — DICLOFENAC SODIUM 75 MG/1
TABLET, DELAYED RELEASE ORAL
Qty: 60 TABLET | Refills: 10 | Status: SHIPPED | OUTPATIENT
Start: 2023-10-23

## 2023-10-23 NOTE — TELEPHONE ENCOUNTER
No care due was identified.  [facility] Embedded Care Due Messages. Reference number: [id number].   10/23/2023 1:47:43 AM CDT

## 2023-11-28 ENCOUNTER — OFFICE VISIT (OUTPATIENT)
Dept: URGENT CARE | Facility: CLINIC | Age: 63
End: 2023-11-28
Payer: MEDICARE

## 2023-11-28 VITALS
OXYGEN SATURATION: 96 % | HEIGHT: 67 IN | BODY MASS INDEX: 33.57 KG/M2 | WEIGHT: 213.88 LBS | HEART RATE: 69 BPM | DIASTOLIC BLOOD PRESSURE: 79 MMHG | RESPIRATION RATE: 18 BRPM | SYSTOLIC BLOOD PRESSURE: 132 MMHG | TEMPERATURE: 98 F

## 2023-11-28 DIAGNOSIS — J06.9 UPPER RESPIRATORY VIRUS: Primary | ICD-10-CM

## 2023-11-28 LAB
CTP QC/QA: YES
SARS-COV-2 AG RESP QL IA.RAPID: NEGATIVE

## 2023-11-28 PROCEDURE — 87811 SARS-COV-2 COVID19 W/OPTIC: CPT | Mod: QW,S$GLB,, | Performed by: NURSE PRACTITIONER

## 2023-11-28 PROCEDURE — 99213 PR OFFICE/OUTPT VISIT, EST, LEVL III, 20-29 MIN: ICD-10-PCS | Mod: S$GLB,,, | Performed by: NURSE PRACTITIONER

## 2023-11-28 PROCEDURE — 99213 OFFICE O/P EST LOW 20 MIN: CPT | Mod: S$GLB,,, | Performed by: NURSE PRACTITIONER

## 2023-11-28 PROCEDURE — 87811 SARS CORONAVIRUS 2 ANTIGEN POCT, MANUAL READ: ICD-10-PCS | Mod: QW,S$GLB,, | Performed by: NURSE PRACTITIONER

## 2023-11-28 RX ORDER — BENZONATATE 100 MG/1
100 CAPSULE ORAL 3 TIMES DAILY PRN
Qty: 30 CAPSULE | Refills: 0 | Status: SHIPPED | OUTPATIENT
Start: 2023-11-28 | End: 2023-12-10

## 2023-11-28 NOTE — PROGRESS NOTES
"Subjective:      Patient ID: Maria M Mei is a 63 y.o. female.    Vitals:  height is 5' 7" (1.702 m) and weight is 97 kg (213 lb 13.5 oz). Her oral temperature is 98 °F (36.7 °C). Her blood pressure is 132/79 and her pulse is 69. Her respiration is 18 and oxygen saturation is 96%.     Chief Complaint: Cough    This is a 63 y.o. female who presents today with a chief complaint of  cough, sore throat, sneezing. Sx started yesterday. Treatment include mucinex with no relief.  denies fever, body aches or chills, denies  wheezing or shortness of breath, denies nausea, vomiting, diarrhea or abdominal pain, denies chest pain or dizziness positional lightheadedness, denies  trouble swallowing, denies loss of taste or smell, or any other symptoms        Cough  This is a new problem. The current episode started yesterday. The problem has been gradually worsening. The problem occurs constantly. The cough is Productive of sputum. Associated symptoms include headaches, nasal congestion, postnasal drip and a sore throat. Pertinent negatives include no sweats. Nothing aggravates the symptoms. She has tried OTC cough suppressant for the symptoms. The treatment provided no relief.       HENT:  Positive for postnasal drip and sore throat.    Respiratory:  Positive for cough.    Neurological:  Positive for headaches.      Objective:     Physical Exam   Constitutional: She is oriented to person, place, and time. She appears well-developed. She is cooperative.  Non-toxic appearance. She does not appear ill. No distress.   HENT:   Head: Normocephalic and atraumatic.   Ears:   Right Ear: Hearing, tympanic membrane, external ear and ear canal normal.   Left Ear: Hearing, tympanic membrane, external ear and ear canal normal.   Nose: Nose normal. No mucosal edema, rhinorrhea or nasal deformity. No epistaxis. Right sinus exhibits no maxillary sinus tenderness and no frontal sinus tenderness. Left sinus exhibits no maxillary sinus " tenderness and no frontal sinus tenderness.   Mouth/Throat: Uvula is midline, oropharynx is clear and moist and mucous membranes are normal. No trismus in the jaw. Normal dentition. No uvula swelling. No oropharyngeal exudate, posterior oropharyngeal edema, posterior oropharyngeal erythema, tonsillar abscesses or cobblestoning.   Eyes: Conjunctivae and lids are normal. No scleral icterus. Extraocular movement intact vision grossly intact gaze aligned appropriately   Neck: Trachea normal and phonation normal. Neck supple. No edema present. No erythema present. No neck rigidity present.   Cardiovascular: Normal rate, regular rhythm, normal heart sounds and normal pulses.   Pulmonary/Chest: Effort normal and breath sounds normal. No stridor. No respiratory distress. She has no decreased breath sounds. She has no wheezes. She has no rhonchi. She has no rales.   Abdominal: Normal appearance.   Musculoskeletal: Normal range of motion.         General: No deformity. Normal range of motion.   Lymphadenopathy:     She has no cervical adenopathy.   Neurological: She is alert and oriented to person, place, and time. She exhibits normal muscle tone. Coordination normal.   Skin: Skin is warm, dry, intact, not diaphoretic and not pale.   Psychiatric: Her speech is normal and behavior is normal. Judgment and thought content normal.   Nursing note and vitals reviewed.    Results for orders placed or performed in visit on 11/28/23   SARS Coronavirus 2 Antigen, POCT Manual Read   Result Value Ref Range    SARS Coronavirus 2 Antigen Negative Negative     Acceptable Yes          Patient in no acute distress.  Vitals reassuring.  Discussed results/diagnosis/plan in depth with patient in clinic. Strict precautions given to patient to monitor for worsening signs and symptoms. Advised to follow up with primary.All questions answered. Strict ER precautions given. If your symptoms worsens or fail to improve you should go to  the Emergency Room. Discharge and follow-up instructions given verbally/printed. Discharge and follow-up instructions discussed with the patient who expressed understanding and willingness to comply with my recommendations.Patient voiced understanding and in agreement with current treatment plan.     Please be advised this text was dictated with [employer] software and may contain errors due to translation.    Assessment:     1. Upper respiratory virus        Plan:       Upper respiratory virus  -     SARS Coronavirus 2 Antigen, POCT Manual Read    Other orders  -     benzonatate (TESSALON) 100 MG capsule; Take 1 capsule (100 mg total) by mouth 3 (three) times daily as needed for Cough.  Dispense: 30 capsule; Refill: 0                  Patient Instructions   PLEASE READ YOUR DISCHARGE INSTRUCTIONS ENTIRELY AS IT CONTAINS IMPORTANT INFORMATION.      Please drink plenty of fluids.    Please get plenty of rest.    Please return here or go to the Emergency Department for any concerns or worsening of condition.    Please take an over the counter antihistamine medication (allegra/Claritin/Zyrtec) of your choice as directed.    Try an over the counter decongestant like Mucinex D or Sudafed. You buy this behind the pharmacy counter    If you do have Hypertension or palpitations, it is safe to take Coricidin HBP for relief of sinus symptoms.    If not allergic, please take over the counter Tylenol (Acetaminophen) and/or Motrin (Ibuprofen) as directed for control of pain and/or fever.  Please follow up with your primary care doctor or specialist as needed.    Sore throat recommendations: Warm fluids, warm salt water gargles, throat lozenges, tea, honey, soup, rest, hydration.    Use over the counter flonase: one spray each nostril twice daily OR two sprays each nostril once daily.     If you  smoke, please stop smoking.      Please return or see your primary care doctor if you develop new or worsening symptoms.     Please  arrange follow up with your primary medical clinic as soon as possible. You must understand that you've received an Urgent Care treatment only and that you may be released before all of your medical problems are known or treated. You, the patient, will arrange for follow up as instructed. If your symptoms worsen or fail to improve you should go to the Emergency Room.

## 2023-12-05 ENCOUNTER — OFFICE VISIT (OUTPATIENT)
Dept: FAMILY MEDICINE | Facility: CLINIC | Age: 63
End: 2023-12-05
Payer: MEDICARE

## 2023-12-05 VITALS
HEIGHT: 67 IN | HEART RATE: 82 BPM | TEMPERATURE: 98 F | SYSTOLIC BLOOD PRESSURE: 126 MMHG | BODY MASS INDEX: 31.08 KG/M2 | DIASTOLIC BLOOD PRESSURE: 68 MMHG | OXYGEN SATURATION: 98 % | WEIGHT: 198 LBS

## 2023-12-05 DIAGNOSIS — J06.9 URTI (ACUTE UPPER RESPIRATORY INFECTION): Primary | ICD-10-CM

## 2023-12-05 DIAGNOSIS — E66.9 CLASS 1 OBESITY WITH BODY MASS INDEX (BMI) OF 34.0 TO 34.9 IN ADULT, UNSPECIFIED OBESITY TYPE, UNSPECIFIED WHETHER SERIOUS COMORBIDITY PRESENT: ICD-10-CM

## 2023-12-05 DIAGNOSIS — Z87.891 PERSONAL HISTORY OF NICOTINE DEPENDENCE: ICD-10-CM

## 2023-12-05 DIAGNOSIS — Z23 NEED FOR SHINGLES VACCINE: ICD-10-CM

## 2023-12-05 DIAGNOSIS — E78.2 MIXED HYPERLIPIDEMIA: ICD-10-CM

## 2023-12-05 PROCEDURE — 1159F PR MEDICATION LIST DOCUMENTED IN MEDICAL RECORD: ICD-10-PCS | Mod: CPTII,S$GLB,, | Performed by: INTERNAL MEDICINE

## 2023-12-05 PROCEDURE — 3044F HG A1C LEVEL LT 7.0%: CPT | Mod: CPTII,S$GLB,, | Performed by: INTERNAL MEDICINE

## 2023-12-05 PROCEDURE — 3074F PR MOST RECENT SYSTOLIC BLOOD PRESSURE < 130 MM HG: ICD-10-PCS | Mod: CPTII,S$GLB,, | Performed by: INTERNAL MEDICINE

## 2023-12-05 PROCEDURE — 3078F PR MOST RECENT DIASTOLIC BLOOD PRESSURE < 80 MM HG: ICD-10-PCS | Mod: CPTII,S$GLB,, | Performed by: INTERNAL MEDICINE

## 2023-12-05 PROCEDURE — 1159F MED LIST DOCD IN RCRD: CPT | Mod: CPTII,S$GLB,, | Performed by: INTERNAL MEDICINE

## 2023-12-05 PROCEDURE — 99214 OFFICE O/P EST MOD 30 MIN: CPT | Mod: S$GLB,,, | Performed by: INTERNAL MEDICINE

## 2023-12-05 PROCEDURE — 1160F PR REVIEW ALL MEDS BY PRESCRIBER/CLIN PHARMACIST DOCUMENTED: ICD-10-PCS | Mod: CPTII,S$GLB,, | Performed by: INTERNAL MEDICINE

## 2023-12-05 PROCEDURE — 3044F PR MOST RECENT HEMOGLOBIN A1C LEVEL <7.0%: ICD-10-PCS | Mod: CPTII,S$GLB,, | Performed by: INTERNAL MEDICINE

## 2023-12-05 PROCEDURE — 3008F BODY MASS INDEX DOCD: CPT | Mod: CPTII,S$GLB,, | Performed by: INTERNAL MEDICINE

## 2023-12-05 PROCEDURE — 3078F DIAST BP <80 MM HG: CPT | Mod: CPTII,S$GLB,, | Performed by: INTERNAL MEDICINE

## 2023-12-05 PROCEDURE — 1160F RVW MEDS BY RX/DR IN RCRD: CPT | Mod: CPTII,S$GLB,, | Performed by: INTERNAL MEDICINE

## 2023-12-05 PROCEDURE — 99999 PR PBB SHADOW E&M-EST. PATIENT-LVL III: CPT | Mod: PBBFAC,,, | Performed by: INTERNAL MEDICINE

## 2023-12-05 PROCEDURE — 3008F PR BODY MASS INDEX (BMI) DOCUMENTED: ICD-10-PCS | Mod: CPTII,S$GLB,, | Performed by: INTERNAL MEDICINE

## 2023-12-05 PROCEDURE — 3074F SYST BP LT 130 MM HG: CPT | Mod: CPTII,S$GLB,, | Performed by: INTERNAL MEDICINE

## 2023-12-05 PROCEDURE — 99999 PR PBB SHADOW E&M-EST. PATIENT-LVL III: ICD-10-PCS | Mod: PBBFAC,,, | Performed by: INTERNAL MEDICINE

## 2023-12-05 PROCEDURE — 99214 PR OFFICE/OUTPT VISIT, EST, LEVL IV, 30-39 MIN: ICD-10-PCS | Mod: S$GLB,,, | Performed by: INTERNAL MEDICINE

## 2023-12-05 RX ORDER — ZOSTER VACCINE RECOMBINANT, ADJUVANTED 50 MCG/0.5
0.5 KIT INTRAMUSCULAR ONCE
Qty: 1 EACH | Refills: 0 | Status: SHIPPED | OUTPATIENT
Start: 2023-12-05 | End: 2023-12-05

## 2023-12-05 RX ORDER — ALBUTEROL SULFATE 90 UG/1
2 AEROSOL, METERED RESPIRATORY (INHALATION) EVERY 6 HOURS PRN
Qty: 18 G | Refills: 0 | Status: SHIPPED | OUTPATIENT
Start: 2023-12-05

## 2023-12-05 RX ORDER — BUPROPION HYDROCHLORIDE 150 MG/1
150 TABLET ORAL DAILY
Qty: 180 TABLET | Refills: 3
Start: 2023-12-05

## 2023-12-05 NOTE — PROGRESS NOTES
Subjective:       Patient ID: Maria M Mei is a 63 y.o. female.    Chief Complaint: Cough, Nasal Congestion, and Sore Throat      HPI  Maria M Mei is a 63 y.o. female with AMI, embolic stroke, smoking, PFO- s/p closure, prediabetes, hyperlipidemia, CKD-3, OA who presents today for Cough, Nasal Congestion, and Sore Throat    Reports 5 days ago started sneezing and later progressed with congestion, sore throat, and cough.  Went to an urgent care where she was evaluated for COVID but found negative.  Diagnose with upper respiratory tract infection and sent home on Tessalon Perles.  Has been using the Tessalon Perles, Claritin, and Mucinex D with some improvement of her sore throat but the cough is still present.  Has checked her blood pressure was 139/87 and Accu-Cheks was 142.  Feels tired and has been drinking water to help her symptoms.  Denies fever. Reports no chest pains, palpitations, or leg swelling.    Overall was doing fine, watching her diet and staying active.  Has been losing weight and with the weight loss feeling better.  Continues with joint pains needing daily anti-inflammatories that do help as when she tries to hold them the pain returns.  Still has episodes of hot flashes or sweats.  Can be at rest.  Does not drink significant coffee but does drink green tea.    Continues to smoke but trying to reduce it.  Does not drink.    Has not receive flu or COVID shot.  Not sure she was the RSV vaccines.  Still needing the 2nd dose for the Shingrix.    Health Maintenance:  Health Maintenance   Topic Date Due    TETANUS VACCINE  09/30/2015    Shingles Vaccine (2 of 2) 10/04/2023    Mammogram  08/24/2024    High Dose Statin  11/28/2024    Colorectal Cancer Screening  11/30/2026    Lipid Panel  08/10/2028    Hepatitis C Screening  Completed       Review of Systems   Constitutional:  Positive for diaphoresis. Negative for fever and unexpected weight change.   HENT:  Positive for nasal congestion,  ear pain, postnasal drip, rhinorrhea, sinus pressure/congestion and sore throat.    Respiratory:  Positive for cough. Negative for shortness of breath.    Cardiovascular: Negative.    Gastrointestinal: Negative.    Genitourinary:  Negative for difficulty urinating.   Musculoskeletal:  Positive for arthralgias and back pain.   Integumentary:  Negative.   Neurological: Negative.    Psychiatric/Behavioral: Negative.        Past Medical History:   Diagnosis Date    COVID-19 12/2020    Hyperlipidemia     MI (myocardial infarction)     Stroke        Past Surgical History:   Procedure Laterality Date    ANKLE SURGERY      CHOLECYSTECTOMY         Family History   Problem Relation Age of Onset    Obesity Mother     Cancer Mother     Diabetes Mother     Hypertension Mother     Lung cancer Mother     Hypertension Father     Stroke Father     Heart disease Father     Obesity Sister     Hyperlipidemia Sister     Hypertension Sister     Arthritis Sister     Obesity Brother     Drug abuse Brother     Diabetes Brother     Heart disease Brother     Diabetes Daughter     Bipolar disorder Daughter     Obesity Daughter     No Known Problems Maternal Grandmother     No Known Problems Maternal Grandfather     No Known Problems Paternal Grandmother     No Known Problems Paternal Grandfather        Social History     Socioeconomic History    Marital status: Single   Tobacco Use    Smoking status: Every Day     Current packs/day: 0.50     Average packs/day: 0.5 packs/day for 36.9 years (18.5 ttl pk-yrs)     Types: Cigarettes     Start date: 1987    Smokeless tobacco: Never    Tobacco comments:     quit for 2 years in the middle; weaned self down, now 3 / day   Substance and Sexual Activity    Alcohol use: Yes     Alcohol/week: 1.0 standard drink of alcohol     Types: 1 Standard drinks or equivalent per week     Comment: Socially    Drug use: No    Sexual activity: Not Currently     Social Determinants of Health     Financial Resource  Strain: Low Risk  (4/24/2023)    Overall Financial Resource Strain (CARDIA)     Difficulty of Paying Living Expenses: Not hard at all   Food Insecurity: No Food Insecurity (4/24/2023)    Hunger Vital Sign     Worried About Running Out of Food in the Last Year: Never true     Ran Out of Food in the Last Year: Never true   Transportation Needs: No Transportation Needs (4/24/2023)    PRAPARE - Transportation     Lack of Transportation (Medical): No     Lack of Transportation (Non-Medical): No   Physical Activity: Insufficiently Active (4/24/2023)    Exercise Vital Sign     Days of Exercise per Week: 2 days     Minutes of Exercise per Session: 60 min   Stress: No Stress Concern Present (4/24/2023)    [employer] [place] [employer] - Occupational Stress Questionnaire     Feeling of Stress : Only a little   Social Connections: Moderately Integrated (4/24/2023)    Social Connection and Isolation Panel [NHANES]     Frequency of Communication with Friends and Family: More than three times a week     Frequency of Social Gatherings with Friends and Family: Once a week     Attends Restorationism Services: More than 4 times per year     Active Member of Clubs or Organizations: Yes     Attends Club or Organization Meetings: More than 4 times per year     Marital Status:    Housing Stability: Low Risk  (4/24/2023)    Housing Stability Vital Sign     Unable to Pay for Housing in the Last Year: No     Number of Places Lived in the Last Year: 1     Unstable Housing in the Last Year: No       Current Outpatient Medications   Medication Sig Dispense Refill    ascorbic acid, vitamin C, (VITAMIN C) 250 MG tablet Take 250 mg by mouth once daily.      atorvastatin (LIPITOR) 40 MG tablet TAKE 1 TABLET EVERY DAY 90 tablet 1    benzonatate (TESSALON) 100 MG capsule Take 1 capsule (100 mg total) by mouth 3 (three) times daily as needed for Cough. 30 capsule 0    cyclobenzaprine (FLEXERIL) 5 MG tablet Take 1 tablet (5 mg total) by  mouth 2 (two) times daily as needed for Muscle spasms. 90 tablet 3    diclofenac (VOLTAREN) 75 MG EC tablet TAKE 1 TABLET TWICE DAILY AS NEEDED FOR PAIN 60 tablet 10    gabapentin (NEURONTIN) 300 MG capsule Take 1 capsule (300 mg total) by mouth every evening. 90 capsule 3    nicotine (NICODERM CQ) 21 mg/24 hr Place 1 patch onto the skin once daily. 28 patch 0    nicotine, polacrilex, (NICORETTE) 2 mg Gum Take 1 each (2 mg total) by mouth as needed (as needed). 100 each 0    pantoprazole (PROTONIX) 40 MG tablet TAKE 1 TABLET EVERY DAY 90 tablet 2    traZODone (DESYREL) 50 MG tablet Take 1 tablet (50 mg total) by mouth every evening. 90 tablet 3    vitamin D (VITAMIN D3) 1000 units Tab Take 1,000 Units by mouth once daily.      zinc gluconate 50 mg tablet Take 50 mg by mouth once daily.      albuterol (VENTOLIN HFA) 90 mcg/actuation inhaler Inhale 2 puffs into the lungs every 6 (six) hours as needed for Wheezing or Shortness of Breath.  Rescue 18 g 0    aspirin 81 MG Chew Chew and swallow 1 tablet (81 mg total) by mouth once daily. 30 tablet 0    buPROPion (WELLBUTRIN XL) 150 MG TB24 tablet Take 1 tablet (150 mg total) by mouth once daily. 180 tablet 3    fluticasone propionate (FLONASE) 50 mcg/actuation nasal spray 2 sprays (100 mcg total) by Each Nostril route once daily. (Patient not taking: Reported on 12/5/2023) 16 g 2    varicella-zoster gE-AS01B, PF, (SHINGRIX, PF,) 50 mcg/0.5 mL injection Inject 0.5 mLs into the muscle once. for 1 dose 1 each 0     No current facility-administered medications for this visit.       Review of patient's allergies indicates:   Allergen Reactions    Codeine Itching         Objective:       Last 3 sets of Vitals        8/9/2023     3:33 PM 11/28/2023    12:00 PM 12/5/2023     8:44 AM   Vitals - 1 value per visit   SYSTOLIC 108 132 126   DIASTOLIC 62 79 68   Pulse 65 69 82   Temp  98 °F (36.7 °C) 98 °F (36.7 °C)   Resp  18    SPO2 98 % 96 % 98 %   Weight (lb) 214.29 213.85 197.97  "  Weight (kg) 97.2 97 89.8   Height 5' 7" (1.702 m) 5' 7" (1.702 m) 5' 7" (1.702 m)   BMI (Calculated) 33.6 33.5 31   Pain Score Six  Zero   Physical Exam  Constitutional:       General: She is not in acute distress.  HENT:      Head: Normocephalic.      Right Ear: Tympanic membrane, ear canal and external ear normal.      Left Ear: Tympanic membrane, ear canal and external ear normal.      Nose: Nose normal.      Mouth/Throat:      Mouth: Mucous membranes are moist.      Comments: Post nasal driop  Eyes:      General: No scleral icterus.     Extraocular Movements: Extraocular movements intact.      Conjunctiva/sclera: Conjunctivae normal.   Neck:      Vascular: No carotid bruit.      Comments: No goiter.  Cardiovascular:      Rate and Rhythm: Normal rate and regular rhythm.      Pulses: Normal pulses.      Heart sounds: Normal heart sounds.   Pulmonary:      Effort: Pulmonary effort is normal.      Breath sounds: Normal breath sounds.      Comments: Mildly productive cough  Abdominal:      General: Bowel sounds are normal. There is no distension.      Palpations: Abdomen is soft. There is no mass.      Tenderness: There is no abdominal tenderness.   Musculoskeletal:         General: No swelling.   Lymphadenopathy:      Cervical: No cervical adenopathy.   Skin:     General: Skin is warm and dry.   Neurological:      General: No focal deficit present.      Mental Status: She is alert and oriented to person, place, and time.   Psychiatric:         Mood and Affect: Mood normal.         Behavior: Behavior normal.           CBC:  Recent Labs   Lab 10/11/21  0648 08/26/22  0815 02/10/23  0925   WBC 8.34 8.09 7.35   RBC 4.28 4.40 4.72   Hemoglobin 13.3 13.5 14.1   Hematocrit 38.9 40.2 43.2   Platelets 284 316 287   MCV 91 91 92   MCH 31.1 H 30.7 29.9   MCHC 34.2 33.6 32.6     CMP:  Recent Labs   Lab 08/26/22  0815 02/10/23  0925 08/10/23  0911   Glucose 103 100 100   Calcium 9.3 9.6 9.2   Albumin 3.8 3.9 3.7   Total " Protein 6.9 7.2 6.5   Sodium 143 141 141   Potassium 3.9 4.5 4.1   CO2 24 26 26   Chloride 108 107 111 H   BUN 20 25 H 26 H   Creatinine 0.9 1.1 1.1   Alkaline Phosphatase 114 112 118   ALT 30 29 26   AST 29 23 24   Total Bilirubin 0.5 0.3 0.6     URINALYSIS:  Recent Labs   Lab 05/23/21  1114   Color, UA Yellow   Specific Gravity, UA >=1.030 A   pH, UA 5.0   Protein, UA Negative   Bacteria Rare   Nitrite, UA Negative   Leukocytes, UA Negative      LIPIDS:  Recent Labs   Lab 11/16/21  0705 02/28/22  0739 08/26/22  0815 02/10/23  0925 08/10/23  0911   TSH 2.282  --   --  2.054 2.881   HDL 42   < > 36 L 33 L 32 L   Cholesterol 182   < > 144 155 156   Triglycerides 134   < > 100 92 137   LDL Cholesterol 113.2   < > 88.0 103.6 96.6   HDL/Cholesterol Ratio 23.1   < > 25.0 21.3 20.5   Non-HDL Cholesterol 140   < > 108 122 124   Total Cholesterol/HDL Ratio 4.3   < > 4.0 4.7 4.9    < > = values in this interval not displayed.     TSH:  Recent Labs   Lab 11/16/21  0705 02/10/23  0925 08/10/23  0911   TSH 2.282 2.054 2.881       A1C:  Recent Labs   Lab 05/23/21  0535 06/26/21  0952 11/16/21  0705 02/28/22  0739 08/26/22  0815 02/10/23  0925 08/10/23  0911   Hemoglobin A1C 5.4 5.6 5.7 H 5.6 5.8 H 5.8 H 5.7 H       Imaging:  Mammo Digital Screening Bilat w/ Zac  Narrative: Result:  Mammo Digital Screening Bilat w/ Zac    History:  Patient is 63 y.o. and is seen for a screening mammogram.    Films Compared:  Prior images (if available) were compared.     Findings:   This procedure was performed using tomosynthesis.   Computer-aided detection was utilized in the interpretation of this   examination.    The breasts are almost entirely fatty. There is no evidence of suspicious   masses, microcalcifications or architectural distortion.  Impression:    No mammographic evidence of malignancy.    BI-RADS Category 1: Negative    Recommendation:  Routine screening mammogram in 1 year is recommended.    Your estimated lifetime risk of  breast cancer (to age 85) based on   Tyrer-Cuzick risk assessment model is 2.06 %.  According to the American   Cancer Society, patients with a lifetime breast cancer risk of 20% or   higher might benefit from supplemental screening tests.      Assessment:       1. URTI (acute upper respiratory infection)    2. Need for shingles vaccine    3. Mixed hyperlipidemia    4. Class 1 obesity with body mass index (BMI) of 34.0 to 34.9 in adult, unspecified obesity type, unspecified whether serious comorbidity present    5. Personal history of nicotine dependence            Plan:       1. URTI (acute upper respiratory infection)  -     slowly improving but still with sinus congestion and postnasal drip.  - albuterol (VENTOLIN HFA) 90 mcg/actuation inhaler; Inhale 2 puffs into the lungs every 6 (six) hours as needed for Wheezing or Shortness of Breath.  Rescue  Dispense: 18 g; Refill: 0  - continue Claritin, Mucinex D in the morning and can take Mucinex DM at night.    - Add fluticasone spray.    2. Need for shingles vaccine  -     varicella-zoster gE-AS01B, PF, (SHINGRIX, PF,) 50 mcg/0.5 mL injection; Inject 0.5 mLs into the muscle once. for 1 dose  Dispense: 1 each; Refill: 0    3. Mixed hyperlipidemia   - on atorvastatin, and on aspirin for her history of stroke.    4. Class 1 obesity with body mass index (BMI) of 34.0 to 34.9 in adult, unspecified obesity type, unspecified whether serious comorbidity present   - Continue to encourage lifestyle modifications.  Has lost around 26 lb.    5. Personal history of nicotine dependence  -    reports episodes of sweats and could be a side effect from Wellbutrin.  Will reduce but can go back to the dose if needed for depression and smoking cessation.  - buPROPion (WELLBUTRIN XL) 150 MG TB24 tablet; Take 1 tablet (150 mg total) by mouth once daily.  Dispense: 180 tablet; Refill: 3  - decrease caffeinated drinks       Health Maintenance Due   Topic Date Due    COVID-19 Vaccine (1)  Never done    TETANUS VACCINE  09/30/2015    RSV Vaccine (Age 60+ and Pregnant patients) (1 - 1-dose 60+ series) Never done    Influenza Vaccine (1) 09/01/2023    Shingles Vaccine (2 of 2) 10/04/2023            Return to clinic in 3-6 months.    Emelia Jones MD  [facility]  Disclaimer:  This note has been generated using voice-recognition software. There may be grammatical or spelling errors that have been missed during proof-reading

## 2023-12-13 ENCOUNTER — TELEPHONE (OUTPATIENT)
Dept: FAMILY MEDICINE | Facility: CLINIC | Age: 63
End: 2023-12-13
Payer: MEDICARE

## 2023-12-13 NOTE — TELEPHONE ENCOUNTER
----- Message from Sharad Bragg sent at 12/13/2023 11:03 AM CST -----  Type:  Pharmacy Calling to Clarify an RX    Name of Caller:Fior  Pharmacy Name:[facility]  Prescription Name:albuterol (VENTOLIN HFA) 90 mcg/actuation inhaler  What do they need to clarify?:pt wanted an specific brand of the inhaler and the pharmacy can only fill the prescription for what's in stock   Best Call Back Number:[phone]  Additional Information:

## 2023-12-13 NOTE — TELEPHONE ENCOUNTER
Called ani spoke to Gloria who informed me the inhaler which was sent over is not in stock . Patient's rx will   most likely not be covered by insurance ,also  called pt. And left  v/m

## 2024-04-12 ENCOUNTER — PATIENT MESSAGE (OUTPATIENT)
Dept: ORTHOPEDICS | Facility: CLINIC | Age: 64
End: 2024-04-12
Payer: MEDICARE

## 2024-04-15 ENCOUNTER — TELEPHONE (OUTPATIENT)
Dept: ORTHOPEDICS | Facility: CLINIC | Age: 64
End: 2024-04-15
Payer: MEDICARE

## 2024-04-15 NOTE — TELEPHONE ENCOUNTER
LVM for patient that but and leg pain sounds like a back issue which [facility] does not treat. Requested call back.

## 2024-04-16 ENCOUNTER — OFFICE VISIT (OUTPATIENT)
Dept: ORTHOPEDICS | Facility: CLINIC | Age: 64
End: 2024-04-16
Payer: MEDICARE

## 2024-04-16 ENCOUNTER — HOSPITAL ENCOUNTER (OUTPATIENT)
Dept: RADIOLOGY | Facility: HOSPITAL | Age: 64
Discharge: HOME | End: 2024-04-16
Attending: PHYSICIAN ASSISTANT
Payer: MEDICARE

## 2024-04-16 VITALS — HEIGHT: 67 IN | BODY MASS INDEX: 30.72 KG/M2 | WEIGHT: 195.75 LBS

## 2024-04-16 DIAGNOSIS — M25.551 RIGHT HIP PAIN: Primary | ICD-10-CM

## 2024-04-16 DIAGNOSIS — M25.551 RIGHT HIP PAIN: ICD-10-CM

## 2024-04-16 DIAGNOSIS — G89.29 CHRONIC RIGHT SI JOINT PAIN: ICD-10-CM

## 2024-04-16 DIAGNOSIS — M16.11 ARTHRITIS OF RIGHT HIP: ICD-10-CM

## 2024-04-16 DIAGNOSIS — M53.3 CHRONIC RIGHT SI JOINT PAIN: ICD-10-CM

## 2024-04-16 DIAGNOSIS — M51.36 LUMBAR DEGENERATIVE DISC DISEASE: ICD-10-CM

## 2024-04-16 PROCEDURE — 73502 X-RAY EXAM HIP UNI 2-3 VIEWS: CPT | Mod: TC,PN,RT

## 2024-04-16 PROCEDURE — 99999 PR PBB SHADOW E&M-EST. PATIENT-LVL IV: CPT | Mod: PBBFAC,,, | Performed by: PHYSICIAN ASSISTANT

## 2024-04-16 PROCEDURE — 1160F RVW MEDS BY RX/DR IN RCRD: CPT | Mod: CPTII,S$GLB,, | Performed by: PHYSICIAN ASSISTANT

## 2024-04-16 PROCEDURE — 73502 X-RAY EXAM HIP UNI 2-3 VIEWS: CPT | Mod: 26,RT,, | Performed by: RADIOLOGY

## 2024-04-16 PROCEDURE — 99213 OFFICE O/P EST LOW 20 MIN: CPT | Mod: S$GLB,,, | Performed by: PHYSICIAN ASSISTANT

## 2024-04-16 PROCEDURE — 3008F BODY MASS INDEX DOCD: CPT | Mod: CPTII,S$GLB,, | Performed by: PHYSICIAN ASSISTANT

## 2024-04-16 PROCEDURE — 1159F MED LIST DOCD IN RCRD: CPT | Mod: CPTII,S$GLB,, | Performed by: PHYSICIAN ASSISTANT

## 2024-04-16 NOTE — PROGRESS NOTES
Subjective:      Patient ID: Maria M Mei is a 63 y.o. female.    Chief Complaint: Pain of the Lower Back (Butt and back pain per patient), Pain of the Right Lower Leg, and Pain of the Right Femur      62yo F presents with one year history of right buttocks pain that radiates to her thigh and into her groin. Also notes midline lumbar back pain and right sided back pain. Worse with walking, prolonged sitting or laying on right side. Notes catching sensation and mild instability. Denies decreased ROM or stiffness. Currently taking diclofenac 75mg. Has been diagnosed with arthritis and sciatica in the past.         Review of Systems   Constitutional: Negative for chills and fever.   Cardiovascular:  Negative for chest pain.   Respiratory:  Negative for cough.    Hematologic/Lymphatic: Does not bruise/bleed easily.   Skin:  Negative for poor wound healing and rash.   Musculoskeletal:  Positive for joint pain, myalgias and stiffness.   Gastrointestinal:  Negative for abdominal pain.   Genitourinary:  Negative for bladder incontinence.   Neurological:  Negative for dizziness, loss of balance and weakness.   Psychiatric/Behavioral:  Negative for altered mental status.        Review of patient's allergies indicates:   Allergen Reactions    Codeine Itching        Current Outpatient Medications   Medication Sig Dispense Refill    albuterol (VENTOLIN HFA) 90 mcg/actuation inhaler Inhale 2 puffs into the lungs every 6 (six) hours as needed for Wheezing or Shortness of Breath.  Rescue 18 g 0    ascorbic acid, vitamin C, (VITAMIN C) 250 MG tablet Take 250 mg by mouth once daily.      atorvastatin (LIPITOR) 40 MG tablet TAKE 1 TABLET EVERY DAY 90 tablet 1    buPROPion (WELLBUTRIN XL) 150 MG TB24 tablet Take 1 tablet (150 mg total) by mouth once daily. 180 tablet 3    cyclobenzaprine (FLEXERIL) 5 MG tablet Take 1 tablet (5 mg total) by mouth 2 (two) times daily as needed for Muscle spasms. 90 tablet 3    diclofenac  "(VOLTAREN) 75 MG EC tablet TAKE 1 TABLET TWICE DAILY AS NEEDED FOR PAIN 60 tablet 10    fluticasone propionate (FLONASE) 50 mcg/actuation nasal spray 2 sprays (100 mcg total) by Each Nostril route once daily. 16 g 2    gabapentin (NEURONTIN) 300 MG capsule Take 1 capsule (300 mg total) by mouth every evening. 90 capsule 3    nicotine (NICODERM CQ) 21 mg/24 hr Place 1 patch onto the skin once daily. 28 patch 0    nicotine, polacrilex, (NICORETTE) 2 mg Gum Take 1 each (2 mg total) by mouth as needed (as needed). 100 each 0    pantoprazole (PROTONIX) 40 MG tablet TAKE 1 TABLET EVERY DAY 90 tablet 2    traZODone (DESYREL) 50 MG tablet Take 1 tablet (50 mg total) by mouth every evening. 90 tablet 3    vitamin D (VITAMIN D3) 1000 units Tab Take 1,000 Units by mouth once daily.      zinc gluconate 50 mg tablet Take 50 mg by mouth once daily.      aspirin 81 MG Chew Chew and swallow 1 tablet (81 mg total) by mouth once daily. 30 tablet 0     No current facility-administered medications for this visit.        The patient's relevant past medical, surgical, and social history was reviewed in Epic.       Objective:      VITAL SIGNS: Ht 5' 7" (1.702 m)   Wt 88.8 kg (195 lb 12.3 oz)   BMI 30.66 kg/m²     General    Nursing note and vitals reviewed.  Constitutional: She is oriented to person, place, and time. She appears well-developed and well-nourished.   Neurological: She is alert and oriented to person, place, and time.     General Musculoskeletal Exam   Gait: antalgic         Right Hip Exam     Tenderness   The patient tender to palpation of the SI joint.    Range of Motion   Abduction:  40   Extension:  10   Flexion:  70   External rotation:  60   Internal rotation:  25     Tests   Stinchfield test: positive    Other   Sensation: normal    Comments:  +ttp SI joint, midline Lspine, right groin  SLR causes pain to the spine but no radicular symptoms.      Muscle Strength   Right Lower Extremity   Hip Abduction: 4/5   Hip " Adduction: 4/5   Hip Flexion: 4/5   Ankle Dorsiflexion:  5/5      X-Ray Hip 2 or 3 views Right (with Pelvis when performed)  Narrative: EXAMINATION:  XR HIP WITH PELVIS WHEN PERFORMED, 2 OR 3  VIEWS RIGHT    CLINICAL HISTORY:  Pain in right hip    TECHNIQUE:  AP view of the pelvis and frog leg lateral view of the right hip were performed.    COMPARISON:  Plain film from 09/28/2021    FINDINGS:  Degenerative changes of the hips bilaterally with no evidence of acute fracture or dislocation.  No soft tissue abnormality.  Impression: As above.    Electronically signed by: Abhishek Winkler MD  Date:    04/16/2024  Time:    09:47  Degenerative changes noted of the right SI joint and lumbar spine.   I have reviewed the above radiograph and agree with the findings stated by the radiologist.         Assessment:       1. Right hip pain    2. Arthritis of right hip    3. Lumbar degenerative disc disease    4. Chronic right SI joint pain          Plan:         Maria M was seen today for pain, pain and pain.    Diagnoses and all orders for this visit:    Right hip pain  -     X-Ray Hip 2 or 3 views Right (with Pelvis when performed); Future    Arthritis of right hip  -     Ambulatory referral/consult to Physical/Occupational Therapy; Future    Lumbar degenerative disc disease  -     Ambulatory referral/consult to Physical/Occupational Therapy; Future  -     Ambulatory referral/consult to [facility] Sp[facility] Clinic; Future    Chronic right SI joint pain  -     Ambulatory referral/consult to Back & Spine Clinic; Future    Patient does have right hip arthritis as well as lumbar spine and SI joint DDD. Will refer to physical therapy for hip, core, lumbar spine. Will also refer to spine specialist for further evaluation of back pain. Explained to the patient she is not a candidate for hip replacement at this time. Follow up as needed.     Diagnoses and plan discussed with the patient, as well as the expected course and duration of his  symptoms.  All questions and concerns were addressed prior to the end of the visit.   Instructed patient to call office if they have any future questions/concerns or to schedule apt. Patient will return to see me if symptoms worsen or fail to improve    Note dictated with voice recognition software, please excuse any grammatical errors.        Ana Abdi PA-C   04/16/2024

## 2024-04-26 ENCOUNTER — OFFICE VISIT (OUTPATIENT)
Dept: FAMILY MEDICINE | Facility: CLINIC | Age: 64
End: 2024-04-26
Payer: MEDICARE

## 2024-04-26 VITALS
WEIGHT: 193.81 LBS | HEART RATE: 80 BPM | BODY MASS INDEX: 30.42 KG/M2 | HEIGHT: 67 IN | OXYGEN SATURATION: 96 % | SYSTOLIC BLOOD PRESSURE: 126 MMHG | DIASTOLIC BLOOD PRESSURE: 78 MMHG

## 2024-04-26 DIAGNOSIS — Z86.73 HISTORY OF CVA (CEREBROVASCULAR ACCIDENT): ICD-10-CM

## 2024-04-26 DIAGNOSIS — F32.4 MAJOR DEPRESSIVE DISORDER, SINGLE EPISODE, IN PARTIAL REMISSION: ICD-10-CM

## 2024-04-26 DIAGNOSIS — M15.8 OTHER OSTEOARTHRITIS INVOLVING MULTIPLE JOINTS: ICD-10-CM

## 2024-04-26 DIAGNOSIS — I70.0 AORTIC ATHEROSCLEROSIS: ICD-10-CM

## 2024-04-26 DIAGNOSIS — N18.31 CHRONIC KIDNEY DISEASE, STAGE 3A: ICD-10-CM

## 2024-04-26 DIAGNOSIS — E78.2 MIXED HYPERLIPIDEMIA: ICD-10-CM

## 2024-04-26 DIAGNOSIS — Z76.89 ENCOUNTER TO ESTABLISH CARE WITH NEW DOCTOR: Primary | ICD-10-CM

## 2024-04-26 DIAGNOSIS — M19.90 ARTHRITIS: ICD-10-CM

## 2024-04-26 DIAGNOSIS — R73.03 PREDIABETES: ICD-10-CM

## 2024-04-26 DIAGNOSIS — Z87.891 PERSONAL HISTORY OF TOBACCO USE: ICD-10-CM

## 2024-04-26 DIAGNOSIS — J41.0 SIMPLE CHRONIC BRONCHITIS: ICD-10-CM

## 2024-04-26 DIAGNOSIS — Z23 ENCOUNTER FOR IMMUNIZATION: ICD-10-CM

## 2024-04-26 PROCEDURE — 3078F DIAST BP <80 MM HG: CPT | Mod: CPTII,S$GLB,, | Performed by: FAMILY MEDICINE

## 2024-04-26 PROCEDURE — 99214 OFFICE O/P EST MOD 30 MIN: CPT | Mod: S$GLB,,, | Performed by: FAMILY MEDICINE

## 2024-04-26 PROCEDURE — 3008F BODY MASS INDEX DOCD: CPT | Mod: CPTII,S$GLB,, | Performed by: FAMILY MEDICINE

## 2024-04-26 PROCEDURE — 3074F SYST BP LT 130 MM HG: CPT | Mod: CPTII,S$GLB,, | Performed by: FAMILY MEDICINE

## 2024-04-26 PROCEDURE — 1159F MED LIST DOCD IN RCRD: CPT | Mod: CPTII,S$GLB,, | Performed by: FAMILY MEDICINE

## 2024-04-26 PROCEDURE — 99999 PR PBB SHADOW E&M-EST. PATIENT-LVL IV: CPT | Mod: PBBFAC,,, | Performed by: FAMILY MEDICINE

## 2024-04-26 RX ORDER — TETANUS TOXOID, REDUCED DIPHTHERIA TOXOID AND ACELLULAR PERTUSSIS VACCINE, ADSORBED 5; 2.5; 8; 8; 2.5 [IU]/.5ML; [IU]/.5ML; UG/.5ML; UG/.5ML; UG/.5ML
0.5 SUSPENSION INTRAMUSCULAR ONCE
Qty: 0.5 ML | Refills: 0 | Status: SHIPPED | OUTPATIENT
Start: 2024-04-26 | End: 2024-04-27

## 2024-04-26 RX ORDER — ATORVASTATIN CALCIUM 40 MG/1
40 TABLET, FILM COATED ORAL DAILY
Qty: 90 TABLET | Refills: 3 | Status: SHIPPED | OUTPATIENT
Start: 2024-04-26

## 2024-04-26 NOTE — PROGRESS NOTES
(Portions of this note were dictated using voice recognition software and may contain dictation related errors in spelling/grammar/syntax not found on text review)    CC:   Chief Complaint   Patient presents with    [facility]    Groin Pain     Right side       HPI: 63 y.o. female presented to [facility] as a new patient, previous PCP moved away.      She has medical history significant for hyperlipidemia, history of embolic stroke, PFO with closure, major depressive disorder, chronic bronchitis.    Hx of TIA: She followed with cardiology, Dr Campbell. She takes ASA and Lipitor 40 mg daily    Prediabetes: last hba1c was 5.8, tries to do diet and exercise    Hip pain, back pain: She follows up with ortho, had recent visit, x ray hip was done which showed chronic degenerative changes bilaterally.  She will be starting physical therapy next week.    Depression:  She takes Wellbutrin 150 mg, reports symptoms are stable, reports adherence, having side effects of excessive sweating    She active smoker and trying to cut down, currently down to 3 cigarettes every day.    She is due for labs.    No other symptoms or concerns.    Past Medical History:   Diagnosis Date    COVID-19 12/2020    Hyperlipidemia     MI (myocardial infarction)     Stroke        Past Surgical History:   Procedure Laterality Date    ANKLE SURGERY      CHOLECYSTECTOMY         Family History   Problem Relation Name Age of Onset    Obesity Mother      Cancer Mother      Diabetes Mother      Hypertension Mother      Lung cancer Mother      Hypertension Father      Stroke Father      Heart disease Father      Obesity Sister 2     Hyperlipidemia Sister 2     Hypertension Sister 2     Arthritis Sister 2     Obesity Brother 2     Drug abuse Brother 2     Diabetes Brother 2     Heart disease Brother 2     Diabetes Daughter x1     Bipolar disorder Daughter x1     Obesity Daughter x1     No Known Problems Maternal Grandmother      No Known Problems  Maternal Grandfather      No Known Problems Paternal Grandmother      No Known Problems Paternal Grandfather         Social History     Tobacco Use    Smoking status: Every Day     Current packs/day: 0.50     Average packs/day: 0.5 packs/day for 37.3 years (18.7 ttl pk-yrs)     Types: Cigarettes     Start date: 1987    Smokeless tobacco: Never    Tobacco comments:     quit for 2 years in the middle; weaned self down, now 3 / day   Substance Use Topics    Alcohol use: Yes     Alcohol/week: 1.0 standard drink of alcohol     Types: 1 Standard drinks or equivalent per week     Comment: Socially    Drug use: No       Lab Results   Component Value Date    WBC 7.35 02/10/2023    HGB 14.1 02/10/2023    HCT 43.2 02/10/2023    MCV 92 02/10/2023     02/10/2023    CHOL 156 08/10/2023    TRIG 137 08/10/2023    HDL 32 (L) 08/10/2023    ALT 26 08/10/2023    AST 24 08/10/2023    BILITOT 0.6 08/10/2023    ALKPHOS 118 08/10/2023     08/10/2023    K 4.1 08/10/2023     (H) 08/10/2023    CREATININE 1.1 08/10/2023    ESTGFRAFRICA >60 02/28/2022    EGFRNONAA 55 (A) 02/28/2022    CALCIUM 9.2 08/10/2023    ALBUMIN 3.7 08/10/2023    BUN 26 (H) 08/10/2023    CO2 26 08/10/2023    TSH 2.881 08/10/2023    INR 0.9 10/11/2021    HGBA1C 5.7 (H) 08/10/2023    LDLCALC 96.6 08/10/2023     08/10/2023             Vital signs reviewed  PE:   APPEARANCE: Well nourished, well developed, in no acute distress.    HEAD: Normocephalic, atraumatic.  EYES: EOMI.  Conjunctivae noninjected.  NOSE: Mucosa pink. Airway clear.  MOUTH & THROAT: No tonsillar enlargement. No pharyngeal erythema or exudate.   NECK: Supple with no cervical lymphadenopathy.    CHEST: Good inspiratory effort. Lungs clear to auscultation with no wheezes or crackles.  CARDIOVASCULAR: Normal S1, S2. No rubs, murmurs, or gallops.  ABDOMEN: Bowel sounds normal. Not distended. Soft. No tenderness or masses. No organomegaly.  EXTREMITIES: No edema, cyanosis, or  clubbing.    Review of Systems   Constitutional:  Negative for chills, fatigue and fever.   HENT: Negative.     Respiratory:  Negative for cough, shortness of breath and wheezing.    Cardiovascular:  Negative for chest pain and claudication.   Gastrointestinal: Negative.    Musculoskeletal:  Positive for arthralgias and back pain.   Neurological: Negative.    Psychiatric/Behavioral: Negative.     All other systems reviewed and are negative.      IMPRESSION  1. Encounter to establish care with new doctor    2. Major depressive disorder, single episode, in partial remission    3. Simple chronic bronchitis    4. Chronic kidney disease, stage 3a    5. Aortic atherosclerosis    6. Prediabetes    7. Encounter for immunization    8. Mixed hyperlipidemia    9. Arthritis    10. History of CVA (cerebrovascular accident)    11. Other osteoarthritis involving multiple joints    12. Personal history of tobacco use            PLAN      1. Major depressive disorder, single episode, in partial remission    Stable    Continue Wellbutrin      2. Simple chronic bronchitis    Stable    Continue albuterol prn      3. Chronic kidney disease, stage 3a    Stable    CMP ordered for monitoring    Avoid nephrotoxic agents    - CBC Auto Differential; Future  - Comprehensive Metabolic Panel; Future  - Lipid Panel; Future    4. Aortic atherosclerosis    Stable    On ASA and statin      5. Prediabetes    - Hemoglobin A1C; Future      6. Encounter for immunization    - diphth,pertus,acell,,tetanus (BOOSTRIX TDAP) 2.5-8-5 Lf-mcg-Lf/0.5mL Syrg injection; Inject 0.5 mLs into the muscle once. for 1 dose  Dispense: 0.5 mL; Refill: 0      7. Mixed hyperlipidemia    - atorvastatin (LIPITOR) 40 MG tablet; Take 1 tablet (40 mg total) by mouth once daily.  Dispense: 90 tablet; Refill: 3      8. Arthritis    - RHEUMATOID FACTOR; Future      9. History of CVA (cerebrovascular accident)    Contin ASA and statin      10. Other osteoarthritis involving multiple  joints    Followed by ortho      11. Personal history of tobacco use    Counseling and encouragement provided      12. Encounter to establish care with new doctor         SCREENINGS      Immunizations:     Tetanus vaccine today      Age/demographic appropriate health maintenance:    Up-to-date with colonoscopy   Up-to-date with mammogram   Up-to-date with Pap smear    Health Maintenance Due   Topic Date Due    TETANUS VACCINE  09/30/2015    RSV Vaccine (Age 60+ and Pregnant patients) (1 - 1-dose 60+ series) Never done    Influenza Vaccine (1) 09/01/2023    COVID-19 Vaccine (1 - 2023-24 season) Never done    Cervical Cancer Screening  06/21/2024           Spent adequate time in obtaining history and explaining differentials     35 minutes spent during this visit of which greater than 50% devoted to face-face counseling and coordination of care regarding diagnosis and management plan       Lucila Carballo   4/26/2024

## 2024-04-27 ENCOUNTER — LAB VISIT (OUTPATIENT)
Dept: LAB | Facility: HOSPITAL | Age: 64
End: 2024-04-27
Attending: FAMILY MEDICINE
Payer: MEDICARE

## 2024-04-27 DIAGNOSIS — M19.90 ARTHRITIS: ICD-10-CM

## 2024-04-27 DIAGNOSIS — R73.03 PREDIABETES: ICD-10-CM

## 2024-04-27 DIAGNOSIS — N18.31 CHRONIC KIDNEY DISEASE, STAGE 3A: ICD-10-CM

## 2024-04-27 LAB
ALBUMIN SERPL BCP-MCNC: 3.8 G/DL (ref 3.5–5.2)
ALP SERPL-CCNC: 141 U/L (ref 55–135)
ALT SERPL W/O P-5'-P-CCNC: 23 U/L (ref 10–44)
ANION GAP SERPL CALC-SCNC: 8 MMOL/L (ref 8–16)
AST SERPL-CCNC: 24 U/L (ref 10–40)
BASOPHILS # BLD AUTO: 0.05 K/UL (ref 0–0.2)
BASOPHILS NFR BLD: 0.7 % (ref 0–1.9)
BILIRUB SERPL-MCNC: 0.4 MG/DL (ref 0.1–1)
BUN SERPL-MCNC: 19 MG/DL (ref 8–23)
CALCIUM SERPL-MCNC: 9.5 MG/DL (ref 8.7–10.5)
CHLORIDE SERPL-SCNC: 110 MMOL/L (ref 95–110)
CHOLEST SERPL-MCNC: 136 MG/DL (ref 120–199)
CHOLEST/HDLC SERPL: 4.3 {RATIO} (ref 2–5)
CO2 SERPL-SCNC: 25 MMOL/L (ref 23–29)
CREAT SERPL-MCNC: 1.1 MG/DL (ref 0.5–1.4)
DIFFERENTIAL METHOD BLD: NORMAL
EOSINOPHIL # BLD AUTO: 0.4 K/UL (ref 0–0.5)
EOSINOPHIL NFR BLD: 6 % (ref 0–8)
ERYTHROCYTE [DISTWIDTH] IN BLOOD BY AUTOMATED COUNT: 13.8 % (ref 11.5–14.5)
EST. GFR  (NO RACE VARIABLE): 57 ML/MIN/1.73 M^2
ESTIMATED AVG GLUCOSE: 117 MG/DL (ref 68–131)
GLUCOSE SERPL-MCNC: 90 MG/DL (ref 70–110)
HBA1C MFR BLD: 5.7 % (ref 4–5.6)
HCT VFR BLD AUTO: 41.6 % (ref 37–48.5)
HDLC SERPL-MCNC: 32 MG/DL (ref 40–75)
HDLC SERPL: 23.5 % (ref 20–50)
HGB BLD-MCNC: 13.8 G/DL (ref 12–16)
IMM GRANULOCYTES # BLD AUTO: 0.01 K/UL (ref 0–0.04)
IMM GRANULOCYTES NFR BLD AUTO: 0.1 % (ref 0–0.5)
LDLC SERPL CALC-MCNC: 82.6 MG/DL (ref 63–159)
LYMPHOCYTES # BLD AUTO: 2.4 K/UL (ref 1–4.8)
LYMPHOCYTES NFR BLD: 33.2 % (ref 18–48)
MCH RBC QN AUTO: 30.7 PG (ref 27–31)
MCHC RBC AUTO-ENTMCNC: 33.2 G/DL (ref 32–36)
MCV RBC AUTO: 92 FL (ref 82–98)
MONOCYTES # BLD AUTO: 0.5 K/UL (ref 0.3–1)
MONOCYTES NFR BLD: 7.2 % (ref 4–15)
NEUTROPHILS # BLD AUTO: 3.8 K/UL (ref 1.8–7.7)
NEUTROPHILS NFR BLD: 52.8 % (ref 38–73)
NONHDLC SERPL-MCNC: 104 MG/DL
NRBC BLD-RTO: 0 /100 WBC
PLATELET # BLD AUTO: 319 K/UL (ref 150–450)
PMV BLD AUTO: 9.5 FL (ref 9.2–12.9)
POTASSIUM SERPL-SCNC: 4.5 MMOL/L (ref 3.5–5.1)
PROT SERPL-MCNC: 7 G/DL (ref 6–8.4)
RBC # BLD AUTO: 4.5 M/UL (ref 4–5.4)
RHEUMATOID FACT SERPL-ACNC: <13 IU/ML (ref 0–15)
SODIUM SERPL-SCNC: 143 MMOL/L (ref 136–145)
TRIGL SERPL-MCNC: 107 MG/DL (ref 30–150)
WBC # BLD AUTO: 7.13 K/UL (ref 3.9–12.7)

## 2024-04-27 PROCEDURE — 83036 HEMOGLOBIN GLYCOSYLATED A1C: CPT | Performed by: FAMILY MEDICINE

## 2024-04-27 PROCEDURE — 36415 COLL VENOUS BLD VENIPUNCTURE: CPT | Performed by: FAMILY MEDICINE

## 2024-04-27 PROCEDURE — 80053 COMPREHEN METABOLIC PANEL: CPT | Performed by: FAMILY MEDICINE

## 2024-04-27 PROCEDURE — 86431 RHEUMATOID FACTOR QUANT: CPT | Performed by: FAMILY MEDICINE

## 2024-04-27 PROCEDURE — 85025 COMPLETE CBC W/AUTO DIFF WBC: CPT | Performed by: FAMILY MEDICINE

## 2024-04-27 PROCEDURE — 80061 LIPID PANEL: CPT | Performed by: FAMILY MEDICINE

## 2024-06-11 ENCOUNTER — HOSPITAL ENCOUNTER (EMERGENCY)
Facility: HOSPITAL | Age: 64
Discharge: HOME | End: 2024-06-11
Attending: EMERGENCY MEDICINE
Payer: MEDICARE

## 2024-06-11 VITALS
DIASTOLIC BLOOD PRESSURE: 73 MMHG | TEMPERATURE: 98 F | RESPIRATION RATE: 18 BRPM | WEIGHT: 184 LBS | BODY MASS INDEX: 28.82 KG/M2 | HEART RATE: 64 BPM | OXYGEN SATURATION: 97 % | SYSTOLIC BLOOD PRESSURE: 130 MMHG
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
## 2024-06-11 DIAGNOSIS — M53.3 COCCYX PAIN: Primary | ICD-10-CM

## 2024-06-11 PROCEDURE — 25000003 PHARM REV CODE 250: Performed by: NURSE PRACTITIONER

## 2024-06-11 PROCEDURE — 99284 EMERGENCY DEPT VISIT MOD MDM: CPT | Mod: 25

## 2024-06-11 RX ORDER — ONDANSETRON 4 MG/1
4 TABLET, ORALLY DISINTEGRATING ORAL EVERY 8 HOURS PRN
Qty: 9 TABLET | Refills: 0 | Status: SHIPPED | OUTPATIENT
Start: 2024-06-11 | End: 2024-06-16

## 2024-06-11 RX ORDER — OXYCODONE AND ACETAMINOPHEN 5; 325 MG/1; MG/1
1 TABLET ORAL
Status: COMPLETED | OUTPATIENT
Start: 2024-06-11 | End: 2024-06-11

## 2024-06-11 RX ORDER — OXYCODONE AND ACETAMINOPHEN 5; 325 MG/1; MG/1
1 TABLET ORAL EVERY 6 HOURS PRN
Qty: 4 TABLET | Refills: 0 | Status: SHIPPED | OUTPATIENT
Start: 2024-06-11 | End: 2024-06-14

## 2024-06-11 RX ORDER — ONDANSETRON 4 MG/1
4 TABLET, ORALLY DISINTEGRATING ORAL
Status: COMPLETED | OUTPATIENT
Start: 2024-06-11 | End: 2024-06-11

## 2024-06-11 RX ADMIN — ONDANSETRON 4 MG: 4 TABLET, ORALLY DISINTEGRATING ORAL at 10:06

## 2024-06-11 RX ADMIN — OXYCODONE HYDROCHLORIDE AND ACETAMINOPHEN 1 TABLET: 5; 325 TABLET ORAL at 10:06

## 2024-06-11 NOTE — ED NOTES
"Pt yelling out in room "I'M READY TO GO", found hitting bed with fists.  Pt states she wants to leave.  NP notified.  "

## 2024-06-11 NOTE — ED TRIAGE NOTES
Slipped and fell while walking down an incline in slippers last night at 11pm. Fell onto buttock. This morning with c/o sacral pain radiating to left hip. Pain worse when sitting. No other complaints. Presents awake, alert.

## 2024-06-11 NOTE — ED PROVIDER NOTES
Encounter Date: 6/11/2024       History     Chief Complaint   Patient presents with    Fall     Fall last night at 2300, slip and fall. Fell on tailbone, pain to low back. Painful to walk, sit, stand on left leg.      63-year-old female presents to the emergency room with reports of pain to her lower back and coccyx.  Patient reports a slip and fall.  She denies hitting her head.  Denies bowel or bladder last, denies numbness or tingling.  Patient reports pain when sitting, or walking.  She denies taking any medication for symptom control.  The fall occurred last night.  Patient has a past medical history of COVID, MI, CVA, hyperlipidemia.    The history is provided by the patient. No  was used.     Review of patient's allergies indicates:   Allergen Reactions    Codeine Itching     Past Medical History:   Diagnosis Date    COVID-19 12/2020    Hyperlipidemia     MI (myocardial infarction)     Stroke      Past Surgical History:   Procedure Laterality Date    ANKLE SURGERY      CHOLECYSTECTOMY       Family History   Problem Relation Name Age of Onset    Obesity Mother      Cancer Mother      Diabetes Mother      Hypertension Mother      Lung cancer Mother      Hypertension Father      Stroke Father      Heart disease Father      Obesity Sister 2     Hyperlipidemia Sister 2     Hypertension Sister 2     Arthritis Sister 2     Obesity Brother 2     Drug abuse Brother 2     Diabetes Brother 2     Heart disease Brother 2     Diabetes Daughter x1     Bipolar disorder Daughter x1     Obesity Daughter x1     No Known Problems Maternal Grandmother      No Known Problems Maternal Grandfather      No Known Problems Paternal Grandmother      No Known Problems Paternal Grandfather       Social History     Tobacco Use    Smoking status: Every Day     Current packs/day: 0.50     Average packs/day: 0.5 packs/day for 37.4 years (18.7 ttl pk-yrs)     Types: Cigarettes     Start date: 1987    Smokeless tobacco:  Never    Tobacco comments:     quit for 2 years in the middle; weaned self down, now 3 / day   Substance Use Topics    Alcohol use: Yes     Alcohol/week: 1.0 standard drink of alcohol     Types: 1 Standard drinks or equivalent per week     Comment: Socially    Drug use: No     Review of Systems   Musculoskeletal:  Positive for back pain.       Physical Exam     Initial Vitals [06/11/24 0838]   BP Pulse Resp Temp SpO2   131/62 68 18 97.9 °F (36.6 °C) 97 %      MAP       --         Physical Exam    Constitutional: She appears well-developed and well-nourished.   HENT:   Head: Normocephalic.   Right Ear: Hearing and tympanic membrane normal.   Left Ear: Hearing and tympanic membrane normal.   Nose: Nose normal.   Mouth/Throat: Oropharynx is clear and moist.   Eyes: Lids are normal. Pupils are equal, round, and reactive to light.   Neck:   Normal range of motion.  Cardiovascular:  Normal rate.           Pulmonary/Chest: Breath sounds normal. No respiratory distress. She has no wheezes. She has no rhonchi.   Abdominal: Abdomen is soft. There is no abdominal tenderness.   Musculoskeletal:         General: Normal range of motion.      Cervical back: Normal range of motion. No rigidity.      Lumbar back: Negative right straight leg raise test and negative left straight leg raise test.        Back:       Comments: Tenderness noted to the marked location. No break in skin noted. No erythema. No step offs.      Neurological: She is alert and oriented to person, place, and time.   Skin: Skin is warm and dry. No rash noted.   Psychiatric: She has a normal mood and affect. Her behavior is normal. Judgment and thought content normal.         ED Course   Procedures  Labs Reviewed - No data to display       Imaging Results              X-Ray Lumbar Spine Ap And Lateral (Final result)  Result time 06/11/24 09:51:07      Final result by Abhishek Winkler MD (06/11/24 09:51:07)                   Impression:      No  fracture.      Electronically signed by: Abhishek Winkler MD  Date:    06/11/2024  Time:    09:51               Narrative:    EXAMINATION:  XR LUMBAR SPINE AP AND LATERAL    CLINICAL HISTORY:  fall;    TECHNIQUE:  AP, lateral and spot images were performed of the lumbar spine.    COMPARISON:  None    FINDINGS:  Vertebral body heights and spinal alignment are maintained.  No evidence of acute fracture or dislocation.  Multilevel facet arthropathy.  Surgical clips of the right upper quadrant.                                       X-Ray Sacrum And Coccyx (Final result)  Result time 06/11/24 09:50:39      Final result by Abhishek Winkler MD (06/11/24 09:50:39)                   Impression:      No acute fracture.      Electronically signed by: Abhishek Winkler MD  Date:    06/11/2024  Time:    09:50               Narrative:    EXAMINATION:  XR SACRUM AND COCCYX    CLINICAL HISTORY:  Unspecified fall, initial encounter    TECHNIQUE:  Two or three views of the sacrum and coccyx were performed.    COMPARISON:  None    FINDINGS:  Sclerotic changes of the SI joints bilaterally, left-sided greater than right.  No erosions.  No evidence of acute fracture or dislocation.  A few pelvic phleboliths.                                       Medications   oxyCODONE-acetaminophen 5-325 mg per tablet 1 tablet (1 tablet Oral Given 6/11/24 1005)   ondansetron disintegrating tablet 4 mg (4 mg Oral Given 6/11/24 1005)     Medical Decision Making  Differential Diagnosis includes, but is not limited to:  Fracture, dislocation, nerve injury/palsy, vascular injury,hemarthrosis, muscle strain, ligament tear/sprain, abrasion, soft tissue contusion, osteoarthritis.       Amount and/or Complexity of Data Reviewed  Radiology: ordered.    Risk  Prescription drug management.               ED Course as of 06/11/24 1734   Tue Jun 11, 2024   0954 FINDINGS:  Vertebral body heights and spinal alignment are maintained.  No evidence of acute fracture or  dislocation.  Multilevel facet arthropathy.  Surgical clips of the right upper quadrant.     Impression:     No fracture.   [DT]   0954 NDINGS:  Sclerotic changes of the SI joints bilaterally, left-sided greater than right.  No erosions.  No evidence of acute fracture or dislocation.  A few pelvic phleboliths.     Impression:     No acute fracture.   [DT]   1000 Independent interpretation of xrays does not identify any acute fractures.  [DT]   1112 Spoke with the pt concerning her lab findings and states she is ready to go home. Reviewed with her the xray findings in addition to return precautions and follow up with pcp. Verbalized understanding and stable for dc. Case reviewed with Dr Overton.  [DT]      ED Course User Index  [DT] Ines Waters NP                           Clinical Impression:  Final diagnoses:  [W19.XXXA] Fall  [M53.3] Coccyx pain (Primary)          ED Disposition Condition    Discharge Stable          ED Prescriptions       Medication Sig Dispense Start Date End Date Auth. Provider    ondansetron (ZOFRAN-ODT) 4 MG TbDL Take 1 tablet (4 mg total) by mouth every 8 (eight) hours as needed (vomiting). 9 tablet 6/11/2024 6/14/2024 Ines Waters NP    oxyCODONE-acetaminophen (PERCOCET) 5-325 mg per tablet Take 1 tablet by mouth every 6 (six) hours as needed for Pain. 4 tablet 6/11/2024 6/12/2024 Ines Waters NP          Follow-up Information       Follow up With Specialties Details Why Contact Info    Lucila Carballo MD Family Medicine Schedule an appointment as soon as possible for a visit in 2 days  [address]  [address]  [facility] [address]  [phone]               Ines Waters NP  06/11/24 2045

## 2024-06-11 NOTE — ED NOTES
Pt ambulatory to ED for tailbone pain after falling last night.  Pt states she was walking to her car when she tripped on her slippers and fell on her buttocks.  Pt endorses L sided pelvic/tailbone pain, denies numbness/tingling.  BLE neuro and peripheral pulses intact.  Pt denies LOC.  Pt endorses one episode of nausea this am, states nausea resolved at this time.  Skin warm/dry, afebrile.  No obvious trauma or injury noted.  VSS.  Bed low/locked, siderails upx2, pt agrees to plan of care.

## 2024-06-11 NOTE — ED NOTES
Present to ED with complaint of lower back pain and tailbone pain 10 out of 10 status post fall last night 6/10/2024. Denies LOC.

## 2024-06-11 NOTE — DISCHARGE INSTRUCTIONS

## 2024-06-19 ENCOUNTER — HOSPITAL ENCOUNTER (OUTPATIENT)
Dept: RADIOLOGY | Facility: HOSPITAL | Age: 64
Discharge: HOME | End: 2024-06-19
Attending: FAMILY MEDICINE
Payer: MEDICARE

## 2024-06-19 ENCOUNTER — OFFICE VISIT (OUTPATIENT)
Dept: FAMILY MEDICINE | Facility: CLINIC | Age: 64
End: 2024-06-19
Payer: MEDICARE

## 2024-06-19 VITALS
HEART RATE: 93 BPM | OXYGEN SATURATION: 97 % | HEIGHT: 67 IN | WEIGHT: 181.44 LBS | SYSTOLIC BLOOD PRESSURE: 128 MMHG | DIASTOLIC BLOOD PRESSURE: 70 MMHG | BODY MASS INDEX: 28.48 KG/M2
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
## 2024-06-19 DIAGNOSIS — M53.3 COCCYX PAIN: ICD-10-CM
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
## 2024-06-19 DIAGNOSIS — F32.1 CURRENT MODERATE EPISODE OF MAJOR DEPRESSIVE DISORDER WITHOUT PRIOR EPISODE: ICD-10-CM

## 2024-06-19 PROCEDURE — 3008F BODY MASS INDEX DOCD: CPT | Mod: CPTII,S$GLB,, | Performed by: FAMILY MEDICINE

## 2024-06-19 PROCEDURE — 1159F MED LIST DOCD IN RCRD: CPT | Mod: CPTII,S$GLB,, | Performed by: FAMILY MEDICINE

## 2024-06-19 PROCEDURE — 3078F DIAST BP <80 MM HG: CPT | Mod: CPTII,S$GLB,, | Performed by: FAMILY MEDICINE

## 2024-06-19 PROCEDURE — 72220 X-RAY EXAM SACRUM TAILBONE: CPT | Mod: TC,FY

## 2024-06-19 PROCEDURE — 3044F HG A1C LEVEL LT 7.0%: CPT | Mod: CPTII,S$GLB,, | Performed by: FAMILY MEDICINE

## 2024-06-19 PROCEDURE — 96372 THER/PROPH/DIAG INJ SC/IM: CPT | Mod: S$GLB,,, | Performed by: FAMILY MEDICINE

## 2024-06-19 PROCEDURE — 99999 PR PBB SHADOW E&M-EST. PATIENT-LVL V: CPT | Mod: PBBFAC,,, | Performed by: FAMILY MEDICINE

## 2024-06-19 PROCEDURE — 99214 OFFICE O/P EST MOD 30 MIN: CPT | Mod: 25,S$GLB,, | Performed by: FAMILY MEDICINE

## 2024-06-19 PROCEDURE — 3074F SYST BP LT 130 MM HG: CPT | Mod: CPTII,S$GLB,, | Performed by: FAMILY MEDICINE

## 2024-06-19 RX ORDER — METHOCARBAMOL 500 MG/1
500 TABLET, FILM COATED ORAL NIGHTLY PRN
Qty: 20 TABLET | Refills: 0 | Status: SHIPPED | OUTPATIENT
Start: 2024-06-19 | End: 2024-07-18

## 2024-06-19 RX ORDER — ESCITALOPRAM OXALATE 5 MG/1
5 TABLET ORAL DAILY
Qty: 30 TABLET | Refills: 3 | Status: SHIPPED | OUTPATIENT
Start: 2024-06-19 | End: 2025-06-19

## 2024-06-19 RX ORDER — TRIAMCINOLONE ACETONIDE 40 MG/ML
40 INJECTION, SUSPENSION INTRA-ARTICULAR; INTRAMUSCULAR ONCE
Status: COMPLETED | OUTPATIENT
Start: 2024-06-19 | End: 2024-06-19

## 2024-06-19 RX ADMIN — TRIAMCINOLONE ACETONIDE 40 MG: 40 INJECTION, SUSPENSION INTRA-ARTICULAR; INTRAMUSCULAR at 02:06

## 2024-06-19 NOTE — PROGRESS NOTES
(Portions of this note were dictated using voice recognition software and may contain dictation related errors in spelling/grammar/syntax not found on text review)    CC:   Chief Complaint   Patient presents with    Fall     On the June 11, she stated got injured her back        HPI: 63 y.o. female presented for follow up after a fall with complaint about lower back and coccyx pain.  She has medical history significant for mixed hyperlipidemia, aortic atherosclerosis, chronic bronchitis, major depressive disorder, history of embolic stroke and TIA.    Patient reports that she slipped and had a fall on the concrete floor outside of her house on 6/11, landed on right leg and hit her right buttock on the floor, she started to have buttock pain, was seen in the ED, x-ray of lumbar spine along with sacrum and coccyx was done and no fracture or dislocation was found, she was discharged on Percocet, she already takes Voltaren 75 mg twice a day for arthritis.      Patient stated that pain has not improved, describes as sharp pain located in the coccyx area on the tailbone, having worsening symptoms with sitting and standing, can not lay down and sit, has been sleeping on the recliner, states that Voltaren and Percocet has not been effective in controlling her symptoms, has been able to walk with a limp and using the stick.    She has follow up appointment with Orthopedic on 06/26.    She has been taking Wellbutrin 150 mg for depression and smoking cessation, stated that it was effective initially, however, in the last few months she has been feeling more depressed and sad and thinks medication is not effective and would like to try something different.    Denies having any other symptoms or concerns.      Past Medical History:   Diagnosis Date    COVID-19 12/2020    Hyperlipidemia     MI (myocardial infarction)     Stroke        Past Surgical History:   Procedure Laterality Date    ANKLE SURGERY      CHOLECYSTECTOMY          Family History   Problem Relation Name Age of Onset    Obesity Mother      Cancer Mother      Diabetes Mother      Hypertension Mother      Lung cancer Mother      Hypertension Father      Stroke Father      Heart disease Father      Obesity Sister 2     Hyperlipidemia Sister 2     Hypertension Sister 2     Arthritis Sister 2     Obesity Brother 2     Drug abuse Brother 2     Diabetes Brother 2     Heart disease Brother 2     Diabetes Daughter x1     Bipolar disorder Daughter x1     Obesity Daughter x1     No Known Problems Maternal Grandmother      No Known Problems Maternal Grandfather      No Known Problems Paternal Grandmother      No Known Problems Paternal Grandfather         Social History     Tobacco Use    Smoking status: Every Day     Current packs/day: 0.50     Average packs/day: 0.5 packs/day for 37.5 years (18.7 ttl pk-yrs)     Types: Cigarettes     Start date: 1987    Smokeless tobacco: Never    Tobacco comments:     quit for 2 years in the middle; weaned self down, now 3 / day   Substance Use Topics    Alcohol use: Yes     Alcohol/week: 1.0 standard drink of alcohol     Types: 1 Standard drinks or equivalent per week     Comment: Socially    Drug use: No       Lab Results   Component Value Date    WBC 7.13 04/27/2024    HGB 13.8 04/27/2024    HCT 41.6 04/27/2024    MCV 92 04/27/2024     04/27/2024    CHOL 136 04/27/2024    TRIG 107 04/27/2024    HDL 32 (L) 04/27/2024    ALT 23 04/27/2024    AST 24 04/27/2024    BILITOT 0.4 04/27/2024    ALKPHOS 141 (H) 04/27/2024     04/27/2024    K 4.5 04/27/2024     04/27/2024    CREATININE 1.1 04/27/2024    ESTGFRAFRICA >60 02/28/2022    EGFRNONAA 55 (A) 02/28/2022    CALCIUM 9.5 04/27/2024    ALBUMIN 3.8 04/27/2024    BUN 19 04/27/2024    CO2 25 04/27/2024    TSH 2.881 08/10/2023    INR 0.9 10/11/2021    HGBA1C 5.7 (H) 04/27/2024    LDLCALC 82.6 04/27/2024    GLU 90 04/27/2024             Vital signs reviewed  PE:   APPEARANCE: Well  nourished, well developed, in no acute distress.    HEAD: Normocephalic, atraumatic.  EYES: EOMI.  Conjunctivae noninjected.  NOSE: Mucosa pink. Airway clear.  NECK: Supple with no cervical lymphadenopathy.    CHEST: Good inspiratory effort. Lungs clear to auscultation with no wheezes or crackles.  CARDIOVASCULAR: Normal S1, S2. No rubs, murmurs, or gallops.  ABDOMEN: Bowel sounds normal. Not distended. Soft. No tenderness or masses. No organomegaly.  EXTREMITIES: No edema, cyanosis, or clubbing.  BUTTOCK: TTP in coccyx, no bruise seen, ROM restricted due to pain    Review of Systems   Constitutional:  Negative for chills, fatigue and fever.   HENT: Negative.     Respiratory:  Negative for cough, shortness of breath and wheezing.    Cardiovascular:  Negative for chest pain, palpitations and leg swelling.   Gastrointestinal: Negative.    Genitourinary: Negative.    Neurological: Negative.    Psychiatric/Behavioral: Negative.     All other systems reviewed and are negative.      IMPRESSION  1. Fall, subsequent encounter    2. Current moderate episode of major depressive disorder without prior episode    3. Coccyx pain            PLAN      1. Current moderate episode of major depressive disorder without prior episode    - EScitalopram oxalate (LEXAPRO) 5 MG Tab; Take 1 tablet (5 mg total) by mouth once daily.  Dispense: 30 tablet; Refill: 3    Continue Wellbutrin 150 mg, added lexapro    Discussed mechanism of action and common side effects of Lexapro    Advised to monitor for any adverse effects        2. Fall, subsequent encounter    - X-Ray Sacrum And Coccyx; Future    - methocarbamoL (ROBAXIN) 500 MG Tab; Take 1 tablet (500 mg total) by mouth nightly as needed.  Dispense: 20 tablet; Refill: 0    - triamcinolone acetonide injection 40 mg    - Ambulatory referral/consult to Physical/Occupational Therapy; Future        3. Coccyx pain    - X-Ray Sacrum And Coccyx; Future    - methocarbamoL (ROBAXIN) 500 MG Tab; Take 1  tablet (500 mg total) by mouth nightly as needed.  Dispense: 20 tablet; Refill: 0    - triamcinolone acetonide injection 40 mg    - Ambulatory referral/consult to Physical/Occupational Therapy; Future         X-ray reviewed from 06/11, no fracture or dislocation, since the symptoms have got worsened, will repeat the x-ray    Recommended to stop taking Voltaren as it is not effective, take ibuprofen 800 mg as needed along with muscle relaxants, Robaxin given, has failed Flexeril in the past    Can use topical analgesics like Voltaren gel along with lidocaine patches, Aspercreme and when case should be fine    Referral to PT OT placed    Encouraged to follow up with Orthopedic        SCREENINGS        Age/demographic appropriate health maintenance:    Health Maintenance Due   Topic Date Due    RSV Vaccine (Age 60+ and Pregnant patients) (1 - 1-dose 60+ series) Never done    Cervical Cancer Screening  06/21/2024    Mammogram  08/24/2024           Spent adequate time in obtaining history and explaining differentials     35 minutes spent during this visit of which greater than 50% devoted to face-face counseling and coordination of care regarding diagnosis and management plan       Lucila Carballo   6/19/2024

## 2024-06-21 ENCOUNTER — TELEPHONE (OUTPATIENT)
Dept: PHYSICAL MEDICINE AND REHAB | Facility: CLINIC | Age: 64
End: 2024-06-21
Payer: MEDICARE

## 2024-06-21 NOTE — TELEPHONE ENCOUNTER
Called patient to reschedule 6/26 appointment with Dr Short since she will OOO in the pm. Patient was very disappointment because she is in a lot of pain. Offered appointment with Pain Management and date of 7/17 with Dr Short. Patient accepted appointments and verbalized her understanding of all instructions.

## 2024-06-26 ENCOUNTER — TELEPHONE (OUTPATIENT)
Dept: PAIN MEDICINE | Facility: CLINIC | Age: 64
End: 2024-06-26
Payer: MEDICARE

## 2024-06-26 ENCOUNTER — OFFICE VISIT (OUTPATIENT)
Dept: PAIN MEDICINE | Facility: CLINIC | Age: 64
End: 2024-06-26
Payer: MEDICARE

## 2024-06-26 VITALS
HEART RATE: 55 BPM | HEIGHT: 67 IN | BODY MASS INDEX: 28.48 KG/M2 | SYSTOLIC BLOOD PRESSURE: 117 MMHG | WEIGHT: 181.44 LBS | DIASTOLIC BLOOD PRESSURE: 76 MMHG

## 2024-06-26 DIAGNOSIS — M53.3 TRAUMATIC COCCYDYNIA: ICD-10-CM

## 2024-06-26 DIAGNOSIS — M70.61 TROCHANTERIC BURSITIS OF RIGHT HIP: Primary | ICD-10-CM

## 2024-06-26 PROCEDURE — 99999 PR PBB SHADOW E&M-EST. PATIENT-LVL III: CPT | Mod: PBBFAC,,, | Performed by: STUDENT IN AN ORGANIZED HEALTH CARE EDUCATION/TRAINING PROGRAM

## 2024-06-26 PROCEDURE — 3008F BODY MASS INDEX DOCD: CPT | Mod: CPTII,S$GLB,, | Performed by: STUDENT IN AN ORGANIZED HEALTH CARE EDUCATION/TRAINING PROGRAM

## 2024-06-26 PROCEDURE — 3074F SYST BP LT 130 MM HG: CPT | Mod: CPTII,S$GLB,, | Performed by: STUDENT IN AN ORGANIZED HEALTH CARE EDUCATION/TRAINING PROGRAM

## 2024-06-26 PROCEDURE — 1159F MED LIST DOCD IN RCRD: CPT | Mod: CPTII,S$GLB,, | Performed by: STUDENT IN AN ORGANIZED HEALTH CARE EDUCATION/TRAINING PROGRAM

## 2024-06-26 PROCEDURE — 3078F DIAST BP <80 MM HG: CPT | Mod: CPTII,S$GLB,, | Performed by: STUDENT IN AN ORGANIZED HEALTH CARE EDUCATION/TRAINING PROGRAM

## 2024-06-26 PROCEDURE — 3044F HG A1C LEVEL LT 7.0%: CPT | Mod: CPTII,S$GLB,, | Performed by: STUDENT IN AN ORGANIZED HEALTH CARE EDUCATION/TRAINING PROGRAM

## 2024-06-26 PROCEDURE — 99204 OFFICE O/P NEW MOD 45 MIN: CPT | Mod: S$GLB,,, | Performed by: STUDENT IN AN ORGANIZED HEALTH CARE EDUCATION/TRAINING PROGRAM

## 2024-06-26 NOTE — PROGRESS NOTES
Chronic Pain - New Consult    Referring Physician: Shirley Grady    Date: 06/26/2024     Re: Maria M Mei  MR#: [id number]  YOB: 1960  Age: 63 y.o.    Chief Complaint:   Chief Complaint   Patient presents with    Rectal Pain    Groin Pain     **This note is dictated using the M*Modal Fluency Direct word recognition program. There are word recognition mistakes that are occasionally missed on review.**    ASSESSMENT: 63 y.o. year old female with right buttock/thigh and tailbone pain, consistent with     1. Trochanteric bursitis of right hip        2. Traumatic coccydynia            PLAN:     Traumatic coccydynia  -continue aspercream and lidocaine patches  -take XS tylenol + ibuprofen 800mg BID for two weeks, then to QD for 1 week and then PRN  -continue with PT  -discussed ganglion impar block    Right trochanteric bursitis  -discussed bursa injection. She would like to schedule this. Risks, benefits, and alternatives were discussed with the patient, and She would like to proceed.      - RTC after injection  - Counseled patient regarding the importance of weight loss and activity modification and physical therapy.    The above plan and management options were discussed at length with patient. Patient is in agreement with the above and verbalized understanding. It will be communicated with the referring physician via electronic record, fax, or mail.  Lab/study reports reviewed were important and necessary because subsequent medical and treatment recommendations required review of the above lab/study reports. Images viewed/reviewed above were important and necessary because subsequent medical and treatment recommendations required review of the reviewed image(s).     Electronically signed by:  Kings Mooney DO  06/26/2024    =========================================================================================================    SUBJECTIVE:    Maria M Mei is a 63 y.o. female  "presents to the clinic for the evaluation of right buttock pain. The pain started 2 years ago following no inciting event and symptoms have been worsening.  The patient states that she has been having a lot of right buttock pain for a couple years since she had an achilles injury and was in a boot for an extended period.      Then on 6/10/24 she slipped and landed on her butt. Since then her tailbone has been really painful.  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  She had an XR at the ER and it did not show any fracture.  The pain has improved somewhat since 6/10.  The pain is just "there" all the time and worse with prolonged sitting.  She has not started PT yet, but it is scheduled.     Pain Description:    The pain is located in the buttock and tail bone area and radiates to the right groin area .    At BEST  6/10   At WORST  10/10 on the WORST day.    On average pain is rated as 7/10.   Today the pain is rated as 8/10  The pain is intermittent.  The pain is described as sharp and shooting    Symptoms interfere with daily activity and sleeping.   Exacerbating factors: Sitting and Walking.    Mitigating factors medications.   She reports 6 hours of sleep per night.    Physical Therapy/Home Exercise: Yes, currently has a home exercise program    Current Pain Medications:    - Percocet 5mg (doesn't take), tylenol, ibuprofen    Failed Pain Medications:    - gabapentin (not helpful for buttock pain)    Pain Treatment Therapies:    Pain procedures: none  Physical Therapy: none  Chiropractor: none  Acupuncture: none  TENS unit: none  Spinal decompression: none  Joint replacement: none    Patient denies urinary incontinence, bowel incontinence, significant motor weakness, and loss of sensations.  Patient denies any suicidal or homicidal ideations     report:  Reviewed and consistent with medication use as prescribed.    Imaging:   XR Sacrum 06/11/24:    Sclerotic changes of the SI joints " bilaterally, left-sided greater than right.  No erosions.  No evidence of acute fracture or dislocation.  A few pelvic phleboliths.         6/26/2024     9:03 AM   Pain Disability Index (PDI)   Family/Home Responsibilities: 8   Recreation: 8   Occupation: 8   Sexual Behavior: 8   Self Care: 8   Life-Support Activities: 8   Pain Disability Index (PDI) 56        Past Medical History:   Diagnosis Date    COVID-19 12/2020    Hyperlipidemia     MI (myocardial infarction)     Stroke      Past Surgical History:   Procedure Laterality Date    ANKLE SURGERY      CHOLECYSTECTOMY       Social History     Socioeconomic History    Marital status: Single   Tobacco Use    Smoking status: Every Day     Current packs/day: 0.50     Average packs/day: 0.5 packs/day for 37.5 years (18.7 ttl pk-yrs)     Types: Cigarettes     Start date: 1987    Smokeless tobacco: Never    Tobacco comments:     quit for 2 years in the middle; weaned self down, now 3 / day   Substance and Sexual Activity    Alcohol use: Yes     Alcohol/week: 1.0 standard drink of alcohol     Types: 1 Standard drinks or equivalent per week     Comment: Socially    Drug use: No    Sexual activity: Not Currently     Social Determinants of Health     Financial Resource Strain: Low Risk  (4/24/2023)    Overall Financial Resource Strain (CARDIA)     Difficulty of Paying Living Expenses: Not hard at all   Food Insecurity: No Food Insecurity (4/24/2023)    Hunger Vital Sign     Worried About Running Out of Food in the Last Year: Never true     Ran Out of Food in the Last Year: Never true   Transportation Needs: No Transportation Needs (4/24/2023)    PRAPARE - Transportation     Lack of Transportation (Medical): No     Lack of Transportation (Non-Medical): No   Physical Activity: Insufficiently Active (4/24/2023)    Exercise Vital Sign     Days of Exercise per Week: 2 days     Minutes of Exercise per Session: 60 min   Stress: No Stress Concern Present (4/24/2023)    [employer] - Occupational Stress Questionnaire     Feeling of Stress : Only a little   Housing Stability: Low Risk  (4/24/2023)    Housing Stability Vital Sign     Unable to Pay for Housing in the Last Year: No     Number of Places Lived in the Last Year: 1     Unstable Housing in the Last Year: No     Family History   Problem Relation Name Age of Onset    Obesity Mother      Cancer Mother      Diabetes Mother      Hypertension Mother      Lung cancer Mother      Hypertension Father      Stroke Father      Heart disease Father      Obesity Sister 2     Hyperlipidemia Sister 2     Hypertension Sister 2     Arthritis Sister 2     Obesity Brother 2     Drug abuse Brother 2     Diabetes Brother 2     Heart disease Brother 2     Diabetes Daughter x1     Bipolar disorder Daughter x1     Obesity Daughter x1     No Known Problems Maternal Grandmother      No Known Problems Maternal Grandfather      No Known Problems Paternal Grandmother      No Known Problems Paternal Grandfather         Review of patient's allergies indicates:   Allergen Reactions    Codeine Itching       Current Outpatient Medications   Medication Sig    albuterol (VENTOLIN HFA) 90 mcg/actuation inhaler Inhale 2 puffs into the lungs every 6 (six) hours as needed for Wheezing or Shortness of Breath.  Rescue    ascorbic acid, vitamin C, (VITAMIN C) 250 MG tablet Take 250 mg by mouth once daily.    aspirin 81 MG Chew Chew and swallow 1 tablet (81 mg total) by mouth once daily.    atorvastatin (LIPITOR) 40 MG tablet Take 1 tablet (40 mg total) by mouth once daily.    buPROPion (WELLBUTRIN XL) 150 MG TB24 tablet Take 1 tablet (150 mg total) by mouth once daily.    diclofenac (VOLTAREN) 75 MG EC tablet TAKE 1 TABLET TWICE DAILY AS NEEDED FOR PAIN    EScitalopram oxalate (LEXAPRO) 5 MG Tab Take 1 tablet (5 mg total) by mouth once daily.    fluticasone propionate (FLONASE) 50 mcg/actuation nasal spray 2 sprays (100 mcg total) by Each Nostril  route once daily. (Patient not taking: Reported on 6/19/2024)    gabapentin (NEURONTIN) 300 MG capsule Take 1 capsule (300 mg total) by mouth every evening. (Patient not taking: Reported on 4/26/2024)    methocarbamoL (ROBAXIN) 500 MG Tab Take 1 tablet (500 mg total) by mouth nightly as needed.    nicotine (NICODERM CQ) 21 mg/24 hr Place 1 patch onto the skin once daily. (Patient not taking: Reported on 4/26/2024)    nicotine, polacrilex, (NICORETTE) 2 mg Gum Take 1 each (2 mg total) by mouth as needed (as needed). (Patient not taking: Reported on 4/26/2024)    oxyCODONE-acetaminophen (PERCOCET) 5-325 mg per tablet Take 1 tablet by mouth every 6 hours as needed for pain    pantoprazole (PROTONIX) 40 MG tablet TAKE 1 TABLET EVERY DAY    traZODone (DESYREL) 50 MG tablet Take 1 tablet (50 mg total) by mouth every evening. (Patient not taking: Reported on 6/19/2024)    vitamin D (VITAMIN D3) 1000 units Tab Take 1,000 Units by mouth once daily.    zinc gluconate 50 mg tablet Take 50 mg by mouth once daily.     No current facility-administered medications for this visit.       REVIEW OF SYSTEMS:    GENERAL:  No weight loss, malaise or fevers.  HEENT:   No recent changes in vision or hearing  NECK:  Negative for lumps, no difficulty with swallowing.  RESPIRATORY:  Negative for cough, wheezing or shortness of breath, patient denies any recent URI.  CARDIOVASCULAR:  Negative for chest pain, leg swelling or palpitations.  GI:  Negative for abdominal discomfort, blood in stools or black stools or change in bowel habits.  MUSCULOSKELETAL:  See HPI.  SKIN:  Negative for lesions, rash, and itching.  PSYCH:  No mood disorder or recent psychosocial stressors.  Patients sleep is not disturbed secondary to pain.  HEMATOLOGY/LYMPHOLOGY:  Negative for prolonged bleeding, bruising easily or swollen nodes.  Patient is not currently taking any anti-coagulants  NEURO:   No history of headaches, syncope, paralysis, seizures or tremors.  All  "other reviewed and negative other than HPI.    OBJECTIVE:    /76 (BP Location: Right arm, Patient Position: Sitting)   Pulse (!) 55   Ht 5' 7" (1.702 m)   Wt 82.3 kg (181 lb 7 oz)   BMI 28.42 kg/m²     PHYSICAL EXAMINATION:    GENERAL: Well appearing, in no acute distress, alert and oriented x3.  PSYCH:  Mood and affect appropriate.  SKIN: Skin color, texture, turgor normal, no rashes or lesions.  HEAD/FACE:  Normocephalic, atraumatic. Cranial nerves grossly intact.  CV: RRR with palpation of the radial artery.  PULM: CTAB. No evidence of respiratory difficulty, symmetric chest rise.  GI:  Soft and non-tender.    BACK:   - No obvious deformity or signs of trauma, Normal lumbar lordotic curve  - Negative spinous process tenderness  - mild Positive paravertebral tenderness  - Negative pain to palpation over the facet joints of the lumbar spine.   - Positive QL / Iliac crest / Glut tenderness on the right  - Slump test is Negative for radicular pain  - Slump test is Negative for back pain  - Supine Straight leg raising is Negative for radicular pain  - Supine Straight leg raising is Negative for back pain  - Lumbar ROM is diminished in Flexion with pain  - Lumbar ROM is normal in Extension without pain  - Did not perform Sustained Hip Flexion test (for discogenic pain)  - Positive Altered Gait, Posture  - Axial facet loading test Negative on the bilateral side(s)    SI Joint exam:  - Positive SI joint tenderness to palpation  - Bull's sign Negative  - Yeoman's Test: Did not perform for SI joint pain indicating anterior SI ligament involvement. Did not perform for anterior thigh pain/paresthesia which indicates femoral nerve stretch.  - Gaenslen's Test:Negative  - Finger Betty's Sign:Negative  - SI compression test:Did not perform  - SI distraction test:Negative  - Thigh Thrust: Did not perform  - SI Thrust: Did not perform    MUSKULOSKELETAL:    EXTREMITIES:   Hip Exam:  - Log Roll Positive for tailbone " pain  - FADIR Positive  - [place] Did not perform  - Hip Scour Negative  - GTB Tenderness Positive      MUSCULOSKELETAL:  No atrophy or tone abnormalities are noted in the UE or LE.  No deformities, edema, or skin discoloration are noted on visible skin. Good capillary refill.    NEURO: Bilateral upper and lower extremity coordination and muscle stretch reflexes are physiologic and symmetric.      NEUROLOGICAL EXAM:  MENTAL STATUS: A x O x 3, good concentration, speech is fluent and goal directed  MEMORY: recent and remote are intact  CN: CN2-12 grossly intact  MOTOR: 5/5 in all muscle groups  DTRs: 2+ intact symmetric  Sensation:    -no Loss of sensation in a left lower and right lower  leg  distribution.  Babinski: absent on the bilateral side(s)  Hampton: absent on the bilateral side(s)  Clonus: absent on the bilateral side(s)    GAIT: antalgic.

## 2024-06-26 NOTE — PATIENT INSTRUCTIONS
The injection we talked about for the tailbone is called a Ganglion Impar block    The injection for the buttock/hip is called a trochanteric bursa injection.    Take 800mg of Ibuprofen with the extra strength tylenol 2x/day (breakfast and dinner) for 2 weeks. Then decrease to 1x/day for 2 weeks. And you can take it as needed after that if you still have pain.

## 2024-07-08 RX ORDER — PANTOPRAZOLE SODIUM 40 MG/1
40 TABLET, DELAYED RELEASE ORAL
Qty: 90 TABLET | Refills: 3 | Status: SHIPPED | OUTPATIENT
Start: 2024-07-08

## 2024-07-25 RX ORDER — DICLOFENAC SODIUM 75 MG/1
TABLET, DELAYED RELEASE ORAL
Qty: 60 TABLET | Refills: 11 | Status: SHIPPED | OUTPATIENT
Start: 2024-07-25

## 2024-08-20 ENCOUNTER — TELEPHONE (OUTPATIENT)
Dept: FAMILY MEDICINE | Facility: CLINIC | Age: 64
End: 2024-08-20
Payer: MEDICARE

## 2024-08-20 DIAGNOSIS — F17.210 CIGARETTE SMOKER: ICD-10-CM

## 2024-08-20 DIAGNOSIS — Z12.31 ENCOUNTER FOR SCREENING MAMMOGRAM FOR BREAST CANCER: Primary | ICD-10-CM

## 2024-08-20 NOTE — TELEPHONE ENCOUNTER
----- Message from Marissa Chanel sent at 8/20/2024 11:13 AM CDT -----  Type:  Patient Requesting Orders     Who Called:MOODY TRINIDAD [[id number]]    Referral to What Specialty:Radiology     Reason for Referral:CT chest lung screening     [facility]     Would the patient rather a call back or a response via [facility]? Call back     Best Call Back Number: [phone]    Additional Information: patient states she is due for her annual CT chest lung screening . Patient states she would like to have the providers office place the order so she can schedule for the next availability. Patient states she received the letter notifying her she is due as of 08/24. Please call back with assistance.

## 2024-08-20 NOTE — TELEPHONE ENCOUNTER
----- Message from Marissa Chanel sent at 8/20/2024 11:09 AM CDT -----  Type:  Mammogram    Caller is requesting to schedule their annual mammogram appointment.  Order is not listed in EPIC.  Please enter order and contact patient to schedule.    Name of Caller:MOODY TRINIDAD [[id number]]    Where would they like the mammogram performed?[facility]     Would the patient rather a call back or a response via [facility]? Call back     Best Call Back Number: [phone]    Additional Information: patient states she is due for her annual mammogram. Patient states she would like to have the providers office place the order so she can schedule for the next availability. Patient states she received the letter notifying her she is due as of 08/24. Please call back with assistance.

## 2024-08-26 ENCOUNTER — HOSPITAL ENCOUNTER (OUTPATIENT)
Dept: RADIOLOGY | Facility: HOSPITAL | Age: 64
Discharge: HOME | End: 2024-08-26
Attending: FAMILY MEDICINE
Payer: MEDICARE

## 2024-08-26 DIAGNOSIS — F17.210 CIGARETTE SMOKER: ICD-10-CM

## 2024-08-26 PROCEDURE — 71271 CT THORAX LUNG CANCER SCR C-: CPT | Mod: TC

## 2024-08-26 PROCEDURE — 71271 CT THORAX LUNG CANCER SCR C-: CPT | Mod: 26,,, | Performed by: RADIOLOGY

## 2024-08-29 ENCOUNTER — OFFICE VISIT (OUTPATIENT)
Dept: PAIN MEDICINE | Facility: CLINIC | Age: 64
End: 2024-08-29
Payer: MEDICARE

## 2024-08-29 VITALS
SYSTOLIC BLOOD PRESSURE: 127 MMHG | HEIGHT: 67 IN | HEART RATE: 67 BPM | WEIGHT: 170 LBS | DIASTOLIC BLOOD PRESSURE: 76 MMHG | BODY MASS INDEX: 26.68 KG/M2

## 2024-08-29 DIAGNOSIS — M16.11 PRIMARY OSTEOARTHRITIS OF RIGHT HIP: ICD-10-CM

## 2024-08-29 DIAGNOSIS — M70.61 TROCHANTERIC BURSITIS OF RIGHT HIP: Primary | ICD-10-CM

## 2024-08-29 PROCEDURE — 3044F HG A1C LEVEL LT 7.0%: CPT | Mod: CPTII,S$GLB,, | Performed by: STUDENT IN AN ORGANIZED HEALTH CARE EDUCATION/TRAINING PROGRAM

## 2024-08-29 PROCEDURE — 1159F MED LIST DOCD IN RCRD: CPT | Mod: CPTII,S$GLB,, | Performed by: STUDENT IN AN ORGANIZED HEALTH CARE EDUCATION/TRAINING PROGRAM

## 2024-08-29 PROCEDURE — 3008F BODY MASS INDEX DOCD: CPT | Mod: CPTII,S$GLB,, | Performed by: STUDENT IN AN ORGANIZED HEALTH CARE EDUCATION/TRAINING PROGRAM

## 2024-08-29 PROCEDURE — 3074F SYST BP LT 130 MM HG: CPT | Mod: CPTII,S$GLB,, | Performed by: STUDENT IN AN ORGANIZED HEALTH CARE EDUCATION/TRAINING PROGRAM

## 2024-08-29 PROCEDURE — 3078F DIAST BP <80 MM HG: CPT | Mod: CPTII,S$GLB,, | Performed by: STUDENT IN AN ORGANIZED HEALTH CARE EDUCATION/TRAINING PROGRAM

## 2024-08-29 PROCEDURE — 99214 OFFICE O/P EST MOD 30 MIN: CPT | Mod: S$GLB,,, | Performed by: STUDENT IN AN ORGANIZED HEALTH CARE EDUCATION/TRAINING PROGRAM

## 2024-08-29 PROCEDURE — 99999 PR PBB SHADOW E&M-EST. PATIENT-LVL IV: CPT | Mod: PBBFAC,,, | Performed by: STUDENT IN AN ORGANIZED HEALTH CARE EDUCATION/TRAINING PROGRAM

## 2024-08-29 NOTE — PROGRESS NOTES
Chronic Pain - f/u    Referring Physician: No ref. provider found    Date: 08/29/2024     Re: Maria M Mei  MR#: [id number]  YOB: 1960  Age: 64 y.o.    Chief Complaint:   Chief Complaint   Patient presents with    Follow-up    Hip Pain    Groin Pain     **This note is dictated using the M*Modal Fluency Direct word recognition program. There are word recognition mistakes that are occasionally missed on review.**    ASSESSMENT: 64 y.o. year old female with right buttock/thigh and tailbone pain, consistent with     1. Trochanteric bursitis of right hip  Ambulatory referral/consult to Physical/Occupational Therapy      2. Primary osteoarthritis of right hip  Ambulatory referral/consult to Physical/Occupational Therapy        PLAN:     Traumatic coccydynia - resolved  -continue aspercream and lidocaine patches  -completed PT  -tylenol and ibuprofen was helpful    Right trochanteric bursitis  7/18/24 @845 - R GTB - 100% x2weeks  -continue Diclofenac 75mg BID PRN. Discussed risks of long term NSAID use  -PT order  -discussed bursa block and ablation as possible future treatment if needed.    Right hip pain  -patient has mild OA and mild (+) provocative maneuvers  -suspect that this may also be causing some underlying right bursitis issues.    - RTC 3 months  - Counseled patient regarding the importance of weight loss and activity modification and physical therapy.    The above plan and management options were discussed at length with patient. Patient is in agreement with the above and verbalized understanding. It will be communicated with the referring physician via electronic record, fax, or mail.  Lab/study reports reviewed were important and necessary because subsequent medical and treatment recommendations required review of the above lab/study reports. Images viewed/reviewed above were important and necessary because subsequent medical and treatment recommendations required review of the reviewed  "image(s).     Electronically signed by:  Kings Mooney DO  08/29/2024    =========================================================================================================    SUBJECTIVE:    Interval History 8/29/2024:   Maria M Mei is a 64 y.o. female presents to the clinic for follow up.  Since last visit the pain has has improved.    The pain is located in the gluteal area and tail bone area and radiates to the right hip and groin area .  The pain is described as sharp and shooting    At BEST  2/10   At WORST  4/10 on the WORST day.    On average pain is rated as 2/10.   Today the pain is rated as 3/10  Symptoms interfere with daily activity and sleeping.   Exacerbating factors: Sitting and Walking.    Mitigating factors medications.     Current pain medications: tylenol, diclofenac 75mg BID  Failed Pain Medications: gabapentin (not helpful for buttock pain)    Pain Procedures:  7/18/24 @845 - R GTB - 100%X2W    Initial hx:  Maria M Mei is a 64 y.o. female presents to the clinic for the evaluation of right buttock pain. The pain started 2 years ago following no inciting event and symptoms have been worsening.  The patient states that she has been having a lot of right buttock pain for a couple years since she had an achilles injury and was in a boot for an extended period.      Then on 6/10/24 she slipped and landed on her butt. Since then her tailbone has been really painful.  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  She had an XR at the ER and it did not show any fracture.  The pain has improved somewhat since 6/10.  The pain is just "there" all the time and worse with prolonged sitting.  She has not started PT yet, but it is scheduled.     Pain Description:    The pain is located in the buttock and tail bone area and radiates to the right groin area .    At BEST  6/10   At WORST  10/10 on the WORST day.    On average pain is rated as 7/10.   Today the pain is " rated as 8/10  The pain is intermittent.  The pain is described as sharp and shooting    Symptoms interfere with daily activity and sleeping.   Exacerbating factors: Sitting and Walking.    Mitigating factors medications.   She reports 6 hours of sleep per night.    Physical Therapy/Home Exercise: Yes, currently has a home exercise program    Current Pain Medications:    - Percocet 5mg (doesn't take), tylenol, ibuprofen    Failed Pain Medications:    - gabapentin (not helpful for buttock pain)    Pain Treatment Therapies:    Pain procedures: none  Physical Therapy: none  Chiropractor: none  Acupuncture: none  TENS unit: none  Spinal decompression: none  Joint replacement: none    Patient denies urinary incontinence, bowel incontinence, significant motor weakness, and loss of sensations.  Patient denies any suicidal or homicidal ideations     report:  Reviewed and consistent with medication use as prescribed.    Imaging:   XR Sacrum 06/11/24:    Sclerotic changes of the SI joints bilaterally, left-sided greater than right.  No erosions.  No evidence of acute fracture or dislocation.  A few pelvic phleboliths.         8/29/2024     8:11 AM 6/26/2024     9:03 AM   Pain Disability Index (PDI)   Family/Home Responsibilities: 2 8   Recreation: 2 8   Occupation: 2 8   Sexual Behavior: 2 8   Self Care: 2 8   Life-Support Activities: 2 8   Pain Disability Index (PDI) 14 56        Past Medical History:   Diagnosis Date    COVID-19 12/2020    Hyperlipidemia     MI (myocardial infarction)     Stroke      Past Surgical History:   Procedure Laterality Date    ANKLE SURGERY      CHOLECYSTECTOMY       Social History     Socioeconomic History    Marital status: Single   Tobacco Use    Smoking status: Every Day     Current packs/day: 0.50     Average packs/day: 0.5 packs/day for 37.7 years (18.8 ttl pk-yrs)     Types: Cigarettes     Start date: 1987    Smokeless tobacco: Never    Tobacco comments:     quit for 2 years in the  middle; weaned self down, now 3 / day   Substance and Sexual Activity    Alcohol use: Yes     Alcohol/week: 1.0 standard drink of alcohol     Types: 1 Standard drinks or equivalent per week     Comment: Socially    Drug use: No    Sexual activity: Not Currently     Social Determinants of Health     Financial Resource Strain: Low Risk  (4/24/2023)    Overall Financial Resource Strain (CARDIA)     Difficulty of Paying Living Expenses: Not hard at all   Food Insecurity: No Food Insecurity (4/24/2023)    Hunger Vital Sign     Worried About Running Out of Food in the Last Year: Never true     Ran Out of Food in the Last Year: Never true   Transportation Needs: No Transportation Needs (4/24/2023)    PRAPARE - Transportation     Lack of Transportation (Medical): No     Lack of Transportation (Non-Medical): No   Physical Activity: Insufficiently Active (4/24/2023)    Exercise Vital Sign     Days of Exercise per Week: 2 days     Minutes of Exercise per Session: 60 min   Stress: No Stress Concern Present (4/24/2023)    [employer] [place] [employer] - Occupational Stress Questionnaire     Feeling of Stress : Only a little   Housing Stability: Low Risk  (4/24/2023)    Housing Stability Vital Sign     Unable to Pay for Housing in the Last Year: No     Number of Places Lived in the Last Year: 1     Unstable Housing in the Last Year: No     Family History   Problem Relation Name Age of Onset    Obesity Mother      Cancer Mother      Diabetes Mother      Hypertension Mother      Lung cancer Mother      Hypertension Father      Stroke Father      Heart disease Father      Obesity Sister 2     Hyperlipidemia Sister 2     Hypertension Sister 2     Arthritis Sister 2     Obesity Brother 2     Drug abuse Brother 2     Diabetes Brother 2     Heart disease Brother 2     Diabetes Daughter x1     Bipolar disorder Daughter x1     Obesity Daughter x1     No Known Problems Maternal Grandmother      No Known Problems Maternal  Grandfather      No Known Problems Paternal Grandmother      No Known Problems Paternal Grandfather         Review of patient's allergies indicates:   Allergen Reactions    Codeine Itching       Current Outpatient Medications   Medication Sig    albuterol (VENTOLIN HFA) 90 mcg/actuation inhaler Inhale 2 puffs into the lungs every 6 (six) hours as needed for Wheezing or Shortness of Breath.  Rescue    ascorbic acid, vitamin C, (VITAMIN C) 250 MG tablet Take 250 mg by mouth once daily.    aspirin 81 MG Chew Chew and swallow 1 tablet (81 mg total) by mouth once daily.    atorvastatin (LIPITOR) 40 MG tablet Take 1 tablet (40 mg total) by mouth once daily.    buPROPion (WELLBUTRIN XL) 150 MG TB24 tablet Take 1 tablet (150 mg total) by mouth once daily.    diclofenac (VOLTAREN) 75 MG EC tablet TAKE 1 TABLET TWICE DAILY AS NEEDED FOR PAIN    EScitalopram oxalate (LEXAPRO) 5 MG Tab Take 1 tablet (5 mg total) by mouth once daily.    fluticasone propionate (FLONASE) 50 mcg/actuation nasal spray 2 sprays (100 mcg total) by Each Nostril route once daily. (Patient not taking: Reported on 6/19/2024)    gabapentin (NEURONTIN) 300 MG capsule Take 1 capsule (300 mg total) by mouth every evening. (Patient not taking: Reported on 4/26/2024)    nicotine (NICODERM CQ) 21 mg/24 hr Place 1 patch onto the skin once daily. (Patient not taking: Reported on 4/26/2024)    nicotine, polacrilex, (NICORETTE) 2 mg Gum Take 1 each (2 mg total) by mouth as needed (as needed). (Patient not taking: Reported on 4/26/2024)    oxyCODONE-acetaminophen (PERCOCET) 5-325 mg per tablet Take 1 tablet by mouth every 6 hours as needed for pain    pantoprazole (PROTONIX) 40 MG tablet TAKE 1 TABLET EVERY DAY    traZODone (DESYREL) 50 MG tablet Take 1 tablet (50 mg total) by mouth every evening. (Patient not taking: Reported on 6/19/2024)    vitamin D (VITAMIN D3) 1000 units Tab Take 1,000 Units by mouth once daily.    zinc gluconate 50 mg tablet Take 50 mg by  "mouth once daily.     No current facility-administered medications for this visit.       REVIEW OF SYSTEMS:    GENERAL:  No weight loss, malaise or fevers.  HEENT:   No recent changes in vision or hearing  NECK:  Negative for lumps, no difficulty with swallowing.  RESPIRATORY:  Negative for cough, wheezing or shortness of breath, patient denies any recent URI.  CARDIOVASCULAR:  Negative for chest pain, leg swelling or palpitations.  GI:  Negative for abdominal discomfort, blood in stools or black stools or change in bowel habits.  MUSCULOSKELETAL:  See HPI.  SKIN:  Negative for lesions, rash, and itching.  PSYCH:  No mood disorder or recent psychosocial stressors.  Patients sleep is not disturbed secondary to pain.  HEMATOLOGY/LYMPHOLOGY:  Negative for prolonged bleeding, bruising easily or swollen nodes.  Patient is not currently taking any anti-coagulants  NEURO:   No history of headaches, syncope, paralysis, seizures or tremors.  All other reviewed and negative other than HPI.    OBJECTIVE:    /76 (BP Location: Left arm, Patient Position: Sitting)   Pulse 67   Ht 5' 7" (1.702 m)   Wt 77.1 kg (170 lb)   BMI 26.63 kg/m²     PHYSICAL EXAMINATION:    GENERAL: Well appearing, in no acute distress, alert and oriented x3.  PSYCH:  Mood and affect appropriate.  SKIN: Skin color, texture, turgor normal, no rashes or lesions.  HEAD/FACE:  Normocephalic, atraumatic. Cranial nerves grossly intact.  CV: RRR with palpation of the radial artery.  PULM: CTAB. No evidence of respiratory difficulty, symmetric chest rise.  GI:  Soft and non-tender.    BACK:   - No obvious deformity or signs of trauma, Normal lumbar lordotic curve  - Negative spinous process tenderness  - Negative paravertebral tenderness  - Negative pain to palpation over the facet joints of the lumbar spine.   - Positive QL / Iliac crest / Glut tenderness on the right  - Slump test is Negative for radicular pain  - Slump test is Negative for back pain  - " Supine Straight leg raising is Negative for radicular pain  - Supine Straight leg raising is Negative for back pain  - Lumbar ROM is diminished in Flexion with pain  - Lumbar ROM is normal in Extension without pain  - Did not perform Sustained Hip Flexion test (for discogenic pain)  - Positive Altered Gait, Posture  - Axial facet loading test Negative on the bilateral side(s)    SI Joint exam:  - Positive SI joint tenderness to palpation  - Bull's sign Negative  - Yeoman's Test: Did not perform for SI joint pain indicating anterior SI ligament involvement. Did not perform for anterior thigh pain/paresthesia which indicates femoral nerve stretch.  - Gaenslen's Test:Negative  - Finger Betty's Sign:Negative  - SI compression test:Did not perform  - SI distraction test:Negative  - Thigh Thrust: Did not perform  - SI Thrust: Did not perform    MUSKULOSKELETAL:    EXTREMITIES:   Hip Exam:  - Log Roll Negative   - FADIR Positive  - Stinchfield Positive  - Hip Scour Negative  - GTB Tenderness Positive      MUSCULOSKELETAL:  No atrophy or tone abnormalities are noted in the UE or LE.  No deformities, edema, or skin discoloration are noted on visible skin. Good capillary refill.    NEURO: Bilateral upper and lower extremity coordination and muscle stretch reflexes are physiologic and symmetric.      NEUROLOGICAL EXAM:  MENTAL STATUS: A x O x 3, good concentration, speech is fluent and goal directed  MEMORY: recent and remote are intact  CN: CN2-12 grossly intact  MOTOR: 5/5 in all muscle groups  DTRs: 2+ intact symmetric  Sensation:    -no Loss of sensation in a left lower and right lower  leg  distribution.  Babinski: absent on the bilateral side(s)  Hampton: absent on the bilateral side(s)  Clonus: absent on the bilateral side(s)    GAIT: antalgic.

## 2024-09-03 DIAGNOSIS — G47.00 INSOMNIA, UNSPECIFIED TYPE: ICD-10-CM

## 2024-09-03 RX ORDER — TRAZODONE HYDROCHLORIDE 50 MG/1
50 TABLET ORAL NIGHTLY
Qty: 90 TABLET | Refills: 3 | Status: SHIPPED | OUTPATIENT
Start: 2024-09-03 | End: 2025-09-03

## 2024-09-05 DIAGNOSIS — Z87.891 PERSONAL HISTORY OF NICOTINE DEPENDENCE: ICD-10-CM

## 2024-09-05 RX ORDER — BUPROPION HYDROCHLORIDE 150 MG/1
150 TABLET ORAL DAILY
Qty: 180 TABLET | Refills: 3 | Status: SHIPPED | OUTPATIENT
Start: 2024-09-05

## 2024-09-11 ENCOUNTER — TELEPHONE (OUTPATIENT)
Dept: REHABILITATION | Facility: HOSPITAL | Age: 64
End: 2024-09-11
Payer: MEDICARE

## 2024-10-03 ENCOUNTER — OFFICE VISIT (OUTPATIENT)
Dept: URGENT CARE | Facility: CLINIC | Age: 64
End: 2024-10-03
Payer: MEDICARE

## 2024-10-03 VITALS
OXYGEN SATURATION: 98 % | TEMPERATURE: 99 F | HEART RATE: 60 BPM | HEIGHT: 67 IN | DIASTOLIC BLOOD PRESSURE: 73 MMHG | SYSTOLIC BLOOD PRESSURE: 144 MMHG | BODY MASS INDEX: 26.68 KG/M2 | WEIGHT: 170 LBS | RESPIRATION RATE: 20 BRPM

## 2024-10-03 DIAGNOSIS — J06.9 VIRAL URI: Primary | ICD-10-CM

## 2024-10-03 DIAGNOSIS — J02.9 SORE THROAT: ICD-10-CM

## 2024-10-03 LAB
CTP QC/QA: YES
MOLECULAR STREP A: NEGATIVE

## 2024-10-03 PROCEDURE — 87651 STREP A DNA AMP PROBE: CPT | Mod: QW,S$GLB,,

## 2024-10-03 PROCEDURE — 99213 OFFICE O/P EST LOW 20 MIN: CPT | Mod: S$GLB,,,

## 2024-10-03 NOTE — PATIENT INSTRUCTIONS
- Rest.    - Drink plenty of fluids. Increasing your fluid intake will help loosen up mucous.  - Viral upper respiratory infections typically run their course in 10-14 days.      - You can take over-the-counter claritin, zyrtec, allegra, OR xyzal as directed. These are antihistamines that can help with runny nose, nasal congestion, sneezing, and helps to dry up post-nasal drip, which usually causes sore throat and cough.    - You can take Delsym to help with cough.     - You can use Flonase (fluticasone) nasal spray as directed for sinus congestion and postnasal drip. This is a steroid nasal spray that works locally over time to decrease the inflammation in your nose/sinuses and help with allergic symptoms. This is not an quick- relief spray like afrin, but it works well if used daily.  Discontinue if you develop nose bleed  - Use nasal saline prior to Flonase.  - Use Ocean Spray Nasal Saline 1-3 puffs each nostril every 2-3 hours then blow out onto tissue. This is to irrigate the nasal passage way to clear the sinus openings. Use until sinus problem resolved.    - A Neti Pot with sterile saline can help break up nasal congestion and give relief.      - Chloraseptic throat spray can help numb the throat.     - Warm salt water gargles can help with sore throat.  - Warm tea with honey can help with sore throat and cough. Honey is a natural cough suppressant.     - Acetaminophen (tylenol) or Ibuprofen (advil,motrin) as directed as needed for fever/pain. Avoid tylenol if you have a history of liver disease. Do not take ibuprofen if you have a history of GI bleeding, kidney disease, or if you take blood thinners.   - Ibuprofen dosing for adults: 400 mg by mouth every 4-6 hours as needed. Max: 2400 mg/day; Info: use lowest effective dose, shortest effective treatment duration; give w/ food if GI upset occurs.  - Tylenol dosing for adults: [By mouth route, immediate-release form] Dose: 325-1000 mg by mouth every 4-6h as  needed; Max: 1 g/4h and 4 g/day from all sources. [By mouth route, extended-release form] Dose: 650-1300 mg Extended Release by mouth every 8h as needed; Max: 4 g/day from all sources.     - You must understand that you have received an Urgent Care treatment only and that you may be released before all of your medical problems are known or treated.   - You, the patient, will arrange for follow up care as instructed.   - If your condition worsens or fails to improve we recommend that you receive another evaluation at the ER immediately or contact your PCP to discuss your concerns or return here.   - Follow up with your PCP or specialty clinic as directed in the next 1-2 weeks if not improved or as needed.  You can call [phone] to schedule an appointment with the appropriate provider.    If your symptoms do not improve or worsen, go to the emergency room immediately.

## 2024-10-03 NOTE — PROGRESS NOTES
"Subjective:      Patient ID: Maria M Mei is a 64 y.o. female.    Vitals:  height is 5' 7" (1.702 m) and weight is 77.1 kg (169 lb 15.6 oz). Her oral temperature is 99 °F (37.2 °C). Her blood pressure is 144/73 (abnormal) and her pulse is 60. Her respiration is 20 and oxygen saturation is 98%.     Chief Complaint: Sore Throat    Pt presents with sore throat that started this morning, painful when swallowing, dry cough, tx: none. Took an at home covid test that was negative today.   Denies ear pain,    Sore Throat   This is a new problem. The current episode started today. The problem has been gradually worsening. The pain is worse on the left side. There has been no fever. The pain is at a severity of 7/10. Associated symptoms include coughing, swollen glands and trouble swallowing. Pertinent negatives include no congestion, ear discharge, ear pain, hoarse voice or shortness of breath. She has had no exposure to strep or mono. She has tried nothing for the symptoms. The treatment provided no relief.       HENT:  Positive for sore throat and trouble swallowing. Negative for ear pain, ear discharge and congestion.    Respiratory:  Positive for cough. Negative for shortness of breath.       Objective:     Physical Exam   Constitutional: She is oriented to person, place, and time. She appears well-developed. She is cooperative.  Non-toxic appearance. She does not appear ill. No distress.      Comments:Patient sits comfortably in exam chair. Answers questions in complete sentences. Does not show any signs of distress or discoloration.        HENT:   Head: Normocephalic and atraumatic.   Ears:   Right Ear: Hearing, tympanic membrane, external ear and ear canal normal. no impacted cerumen  Left Ear: Hearing, tympanic membrane, external ear and ear canal normal. no impacted cerumen  Nose: Nose normal. No mucosal edema, rhinorrhea, nasal deformity or congestion. No epistaxis. Right sinus exhibits no maxillary sinus " tenderness and no frontal sinus tenderness. Left sinus exhibits no maxillary sinus tenderness and no frontal sinus tenderness.   Mouth/Throat: Uvula is midline and mucous membranes are normal. No trismus in the jaw. Normal dentition. No uvula swelling. Posterior oropharyngeal erythema present. No oropharyngeal exudate or posterior oropharyngeal edema. No tonsillar exudate.   Eyes: Conjunctivae and lids are normal. No scleral icterus.   Neck: Trachea normal and phonation normal. Neck supple. No edema present. No erythema present. No neck rigidity present.   Cardiovascular: Normal rate, regular rhythm, normal heart sounds and normal pulses.   Pulmonary/Chest: Effort normal and breath sounds normal. No stridor. No respiratory distress. She has no decreased breath sounds. She has no wheezes. She has no rhonchi. She has no rales.   Abdominal: Normal appearance.   Musculoskeletal: Normal range of motion.         General: No deformity. Normal range of motion.   Lymphadenopathy:     She has no cervical adenopathy.        Right cervical: No superficial cervical, no deep cervical and no posterior cervical adenopathy present.       Left cervical: No superficial cervical, no deep cervical and no posterior cervical adenopathy present.   Neurological: She is alert and oriented to person, place, and time. She exhibits normal muscle tone. Coordination normal.   Skin: Skin is warm, dry, intact, not diaphoretic and not pale.   Psychiatric: Her speech is normal and behavior is normal. Judgment and thought content normal.   Nursing note and vitals reviewed.    Results for orders placed or performed in visit on 10/03/24   POCT Strep A, Molecular    Collection Time: 10/03/24 11:02 AM   Result Value Ref Range    Molecular Strep A, POC Negative Negative     Acceptable Yes        Assessment:     1. Viral URI    2. Sore throat        Plan:       Viral URI    Sore throat  -     POCT Strep A, Molecular                Patient  Instructions   - Rest.    - Drink plenty of fluids. Increasing your fluid intake will help loosen up mucous.  - Viral upper respiratory infections typically run their course in 10-14 days.      - You can take over-the-counter claritin, zyrtec, allegra, OR xyzal as directed. These are antihistamines that can help with runny nose, nasal congestion, sneezing, and helps to dry up post-nasal drip, which usually causes sore throat and cough.    - You can take Delsym to help with cough.     - You can use Flonase (fluticasone) nasal spray as directed for sinus congestion and postnasal drip. This is a steroid nasal spray that works locally over time to decrease the inflammation in your nose/sinuses and help with allergic symptoms. This is not an quick- relief spray like afrin, but it works well if used daily.  Discontinue if you develop nose bleed  - Use nasal saline prior to Flonase.  - Use Ocean Spray Nasal Saline 1-3 puffs each nostril every 2-3 hours then blow out onto tissue. This is to irrigate the nasal passage way to clear the sinus openings. Use until sinus problem resolved.    - A Neti Pot with sterile saline can help break up nasal congestion and give relief.      - Chloraseptic throat spray can help numb the throat.     - Warm salt water gargles can help with sore throat.  - Warm tea with honey can help with sore throat and cough. Honey is a natural cough suppressant.     - Acetaminophen (tylenol) or Ibuprofen (advil,motrin) as directed as needed for fever/pain. Avoid tylenol if you have a history of liver disease. Do not take ibuprofen if you have a history of GI bleeding, kidney disease, or if you take blood thinners.   - Ibuprofen dosing for adults: 400 mg by mouth every 4-6 hours as needed. Max: 2400 mg/day; Info: use lowest effective dose, shortest effective treatment duration; give w/ food if GI upset occurs.  - Tylenol dosing for adults: [By mouth route, immediate-release form] Dose: 325-1000 mg by mouth  every 4-6h as needed; Max: 1 g/4h and 4 g/day from all sources. [By mouth route, extended-release form] Dose: 650-1300 mg Extended Release by mouth every 8h as needed; Max: 4 g/day from all sources.     - You must understand that you have received an Urgent Care treatment only and that you may be released before all of your medical problems are known or treated.   - You, the patient, will arrange for follow up care as instructed.   - If your condition worsens or fails to improve we recommend that you receive another evaluation at the ER immediately or contact your PCP to discuss your concerns or return here.   - Follow up with your PCP or specialty clinic as directed in the next 1-2 weeks if not improved or as needed.  You can call [phone] to schedule an appointment with the appropriate provider.    If your symptoms do not improve or worsen, go to the emergency room immediately.

## 2024-10-28 ENCOUNTER — HOSPITAL ENCOUNTER (OUTPATIENT)
Dept: RADIOLOGY | Facility: HOSPITAL | Age: 64
Discharge: HOME | End: 2024-10-28
Attending: FAMILY MEDICINE
Payer: MEDICARE

## 2024-10-28 DIAGNOSIS — Z12.31 ENCOUNTER FOR SCREENING MAMMOGRAM FOR BREAST CANCER: ICD-10-CM

## 2024-10-28 PROCEDURE — 77067 SCR MAMMO BI INCL CAD: CPT | Mod: TC

## 2024-10-28 PROCEDURE — 77063 BREAST TOMOSYNTHESIS BI: CPT | Mod: TC

## 2024-12-18 DIAGNOSIS — F32.1 CURRENT MODERATE EPISODE OF MAJOR DEPRESSIVE DISORDER WITHOUT PRIOR EPISODE: ICD-10-CM

## 2024-12-18 RX ORDER — ESCITALOPRAM OXALATE 5 MG/1
5 TABLET ORAL DAILY
Qty: 30 TABLET | Refills: 3 | Status: SHIPPED | OUTPATIENT
Start: 2024-12-18 | End: 2025-12-18

## 2025-02-13 DIAGNOSIS — E78.2 MIXED HYPERLIPIDEMIA: ICD-10-CM

## 2025-02-13 RX ORDER — ATORVASTATIN CALCIUM 40 MG/1
40 TABLET, FILM COATED ORAL
Qty: 90 TABLET | Refills: 3 | Status: SHIPPED | OUTPATIENT
Start: 2025-02-13

## 2025-03-17 ENCOUNTER — HOSPITAL ENCOUNTER (OUTPATIENT)
Dept: RADIOLOGY | Facility: HOSPITAL | Age: 65
Discharge: HOME | End: 2025-03-17
Attending: FAMILY MEDICINE
Payer: MEDICARE

## 2025-03-17 ENCOUNTER — OFFICE VISIT (OUTPATIENT)
Dept: FAMILY MEDICINE | Facility: CLINIC | Age: 65
End: 2025-03-17
Payer: MEDICARE

## 2025-03-17 VITALS
DIASTOLIC BLOOD PRESSURE: 74 MMHG | TEMPERATURE: 98 F | HEIGHT: 67 IN | WEIGHT: 197.31 LBS | OXYGEN SATURATION: 98 % | HEART RATE: 66 BPM | BODY MASS INDEX: 30.97 KG/M2 | SYSTOLIC BLOOD PRESSURE: 130 MMHG

## 2025-03-17 DIAGNOSIS — M54.41 CHRONIC RIGHT-SIDED LOW BACK PAIN WITH RIGHT-SIDED SCIATICA: ICD-10-CM

## 2025-03-17 DIAGNOSIS — M25.552 BILATERAL HIP PAIN: Primary | ICD-10-CM

## 2025-03-17 DIAGNOSIS — F51.01 PRIMARY INSOMNIA: ICD-10-CM

## 2025-03-17 DIAGNOSIS — M25.551 BILATERAL HIP PAIN: ICD-10-CM

## 2025-03-17 DIAGNOSIS — N18.31 CHRONIC KIDNEY DISEASE, STAGE 3A: ICD-10-CM

## 2025-03-17 DIAGNOSIS — J41.0 SIMPLE CHRONIC BRONCHITIS: ICD-10-CM

## 2025-03-17 DIAGNOSIS — M25.551 BILATERAL HIP PAIN: Primary | ICD-10-CM

## 2025-03-17 DIAGNOSIS — G89.29 CHRONIC RIGHT-SIDED LOW BACK PAIN WITH RIGHT-SIDED SCIATICA: ICD-10-CM

## 2025-03-17 DIAGNOSIS — M25.552 BILATERAL HIP PAIN: ICD-10-CM

## 2025-03-17 DIAGNOSIS — F32.1 CURRENT MODERATE EPISODE OF MAJOR DEPRESSIVE DISORDER WITHOUT PRIOR EPISODE: ICD-10-CM

## 2025-03-17 PROCEDURE — 1159F MED LIST DOCD IN RCRD: CPT | Mod: CPTII,S$GLB,, | Performed by: FAMILY MEDICINE

## 2025-03-17 PROCEDURE — 99999 PR PBB SHADOW E&M-EST. PATIENT-LVL V: CPT | Mod: PBBFAC,,, | Performed by: FAMILY MEDICINE

## 2025-03-17 PROCEDURE — 99214 OFFICE O/P EST MOD 30 MIN: CPT | Mod: S$GLB,,, | Performed by: FAMILY MEDICINE

## 2025-03-17 PROCEDURE — 3078F DIAST BP <80 MM HG: CPT | Mod: CPTII,S$GLB,, | Performed by: FAMILY MEDICINE

## 2025-03-17 PROCEDURE — 3075F SYST BP GE 130 - 139MM HG: CPT | Mod: CPTII,S$GLB,, | Performed by: FAMILY MEDICINE

## 2025-03-17 PROCEDURE — 3008F BODY MASS INDEX DOCD: CPT | Mod: CPTII,S$GLB,, | Performed by: FAMILY MEDICINE

## 2025-03-17 PROCEDURE — 72100 X-RAY EXAM L-S SPINE 2/3 VWS: CPT | Mod: 26,,, | Performed by: RADIOLOGY

## 2025-03-17 PROCEDURE — 73521 X-RAY EXAM HIPS BI 2 VIEWS: CPT | Mod: 26,,, | Performed by: RADIOLOGY

## 2025-03-17 PROCEDURE — 72100 X-RAY EXAM L-S SPINE 2/3 VWS: CPT | Mod: TC,FY

## 2025-03-17 PROCEDURE — 73521 X-RAY EXAM HIPS BI 2 VIEWS: CPT | Mod: TC,FY

## 2025-03-17 RX ORDER — QUETIAPINE FUMARATE 25 MG/1
25 TABLET, FILM COATED ORAL NIGHTLY PRN
Qty: 30 TABLET | Refills: 2 | Status: SHIPPED | OUTPATIENT
Start: 2025-03-17 | End: 2026-03-17

## 2025-03-17 NOTE — PROGRESS NOTES
(Portions of this note were dictated using voice recognition software and may contain dictation related errors in spelling/grammar/syntax not found on text review)    CC:   Chief Complaint   Patient presents with    Leg Pain       HPI: 64 y.o. female presented for evaluation of hip and leg pain.  She has medical history significant for TIA, mixed hyperlipidemia, prediabetes, osteoarthritis of multiple joints, CKD, anxiety and depression.      Patient reports that she was working in her garden 2 weeks ago when she started to have left hip pain, she made appointment, however, symptoms have resolved by now.    She stated that yesterday she was again working in the Garden, she was sitting down and pulling up the weeds when she stood up all of a sudden she started to have sharp pain in the right hip, since then it has been constant, located on the right side of the lower back and radiates towards the right hip and the legs, described as shooting and radiating, there is no numbness or tingling, patient stated that with walking she is noticing buckling and hip is coming out of the socket like feeling.  She has a history of arthritis of multiple joints, x-ray was done in 2021 which showed bilateral osteoarthritis of hip joint    Her other concern today is about having anxiety and depression.  She feels depressed and sad, can not sleep well at night, stated that her daughter and grandkids are not talking to her due to family dispute.  She was on Wellbutrin previously, stopped taking it due to side effects of sweating.  She was given Lexapro on last office visit, patient did not take it long enough, stated that it was not working and she stopped taking it altogether, she has tried different sleep medications as well, stated that she would like to try Seroquel which her sister takes and works well    No other symptoms or concerns reported    Past Medical History:   Diagnosis Date    COVID-19 12/2020    Hyperlipidemia     MI  (myocardial infarction)     Stroke        Past Surgical History:   Procedure Laterality Date    ANKLE SURGERY      CHOLECYSTECTOMY         Family History   Problem Relation Name Age of Onset    Obesity Mother      Cancer Mother      Diabetes Mother      Hypertension Mother      Lung cancer Mother      Hypertension Father      Stroke Father      Heart disease Father      Obesity Sister 2     Hyperlipidemia Sister 2     Hypertension Sister 2     Arthritis Sister 2     Obesity Brother 2     Drug abuse Brother 2     Diabetes Brother 2     Heart disease Brother 2     Diabetes Daughter x1     Bipolar disorder Daughter x1     Obesity Daughter x1     No Known Problems Maternal Grandmother      No Known Problems Maternal Grandfather      No Known Problems Paternal Grandmother      No Known Problems Paternal Grandfather         Social History[1]    Lab Results   Component Value Date    WBC 7.13 04/27/2024    HGB 13.8 04/27/2024    HCT 41.6 04/27/2024    MCV 92 04/27/2024     04/27/2024    CHOL 136 04/27/2024    TRIG 107 04/27/2024    HDL 32 (L) 04/27/2024    ALT 23 04/27/2024    AST 24 04/27/2024    BILITOT 0.4 04/27/2024    ALKPHOS 141 (H) 04/27/2024     04/27/2024    K 4.5 04/27/2024     04/27/2024    CREATININE 1.1 04/27/2024    ESTGFRAFRICA >60 02/28/2022    EGFRNONAA 55 (A) 02/28/2022    CALCIUM 9.5 04/27/2024    ALBUMIN 3.8 04/27/2024    BUN 19 04/27/2024    CO2 25 04/27/2024    TSH 2.881 08/10/2023    INR 0.9 10/11/2021    HGBA1C 5.7 (H) 04/27/2024    LDLCALC 82.6 04/27/2024    GLU 90 04/27/2024             Vital signs reviewed  PE:   APPEARANCE: Well nourished, well developed, in no acute distress.    HEAD: Normocephalic, atraumatic.  EYES: EOMI.  Conjunctivae noninjected.  NOSE: Mucosa pink. Airway clear.  NECK: Supple with no cervical lymphadenopathy.    CHEST: Good inspiratory effort. Lungs clear to auscultation with no wheezes or crackles.  CARDIOVASCULAR: Normal S1, S2. No rubs, murmurs, or  gallops.  ABDOMEN: Bowel sounds normal. Not distended. Soft. No tenderness or masses. No organomegaly.  EXTREMITIES: No edema, cyanosis, or clubbing.    Review of Systems   Constitutional:  Negative for chills, fatigue and fever.   HENT: Negative.     Respiratory:  Negative for cough, shortness of breath and wheezing.    Cardiovascular:  Negative for chest pain, palpitations and claudication.   Gastrointestinal: Negative.    Genitourinary: Negative.    Musculoskeletal:  Positive for arthralgias.   Neurological: Negative.    Psychiatric/Behavioral:  Positive for dysphoric mood and sleep disturbance. Negative for suicidal ideas. The patient is nervous/anxious.    All other systems reviewed and are negative.      IMPRESSION  1. Bilateral hip pain    2. Current moderate episode of major depressive disorder without prior episode    3. Chronic kidney disease, stage 3a    4. Simple chronic bronchitis    5. Primary insomnia    6. Chronic right-sided low back pain with right-sided sciatica            PLAN      1. Current moderate episode of major depressive disorder without prior episode    - Ambulatory referral/consult to Psychology; Future    Advised to restart taking Lexapro, advised to take it 5 mg and then increased to 10 mg in 1-2 weeks     Made her aware that it will take some weeks to have the full concentration in the blood and after that she will start noticing any effect    Psychology referral placed, encouraged her to consider therapy      2. Chronic kidney disease, stage 3a    Stable     Avoid nephrotoxic agents      3. Simple chronic bronchitis    Stable     Continue inhaler as needed        4. Bilateral hip pain (Primary)    - X-Ray Hips Bilateral 2 View Incl AP Pelvis; Future    Continue NSAIDs as needed along with topical analgesics   Lidocaine patches prn      5. Primary insomnia    - QUEtiapine (SEROQUEL) 25 MG Tab; Take 1 tablet (25 mg total) by mouth nightly as needed (sleep).  Dispense: 30 tablet;  Refill: 2      6. Chronic right-sided low back pain with right-sided sciatica    - X-Ray Lumbar Spine AP And Lateral; Future           SCREENINGS        Age/demographic appropriate health maintenance:    Health Maintenance Due   Topic Date Due    RSV Vaccine (Age 60+ and Pregnant patients) (1 - Risk 60-74 years 1-dose series) Never done    Cervical Cancer Screening  06/21/2024    Influenza Vaccine (1) 09/01/2024    COVID-19 Vaccine (1 - 2024-25 season) Never done         Return ED/urgent care precautions    Spent adequate time in obtaining history and explaining differentials     35 minutes spent during this visit of which greater than 50% devoted to face-face counseling and coordination of care regarding diagnosis and management plan       Lucila Haris   3/17/2025         [1]   Social History  Tobacco Use    Smoking status: Every Day     Current packs/day: 0.50     Average packs/day: 0.5 packs/day for 38.2 years (19.1 ttl pk-yrs)     Types: Cigarettes     Start date: 1987    Smokeless tobacco: Never    Tobacco comments:     quit for 2 years in the middle; weaned self down, now 3 / day   Substance Use Topics    Alcohol use: Yes     Alcohol/week: 1.0 standard drink of alcohol     Types: 1 Standard drinks or equivalent per week     Comment: Socially    Drug use: No

## 2025-03-18 ENCOUNTER — RESULTS FOLLOW-UP (OUTPATIENT)
Dept: FAMILY MEDICINE | Facility: CLINIC | Age: 65
End: 2025-03-18

## 2025-03-21 ENCOUNTER — OFFICE VISIT (OUTPATIENT)
Dept: FAMILY MEDICINE | Facility: CLINIC | Age: 65
End: 2025-03-21
Payer: MEDICARE

## 2025-03-21 VITALS
HEART RATE: 87 BPM | TEMPERATURE: 98 F | OXYGEN SATURATION: 97 % | SYSTOLIC BLOOD PRESSURE: 126 MMHG | DIASTOLIC BLOOD PRESSURE: 70 MMHG

## 2025-03-21 DIAGNOSIS — M54.41 CHRONIC RIGHT-SIDED LOW BACK PAIN WITH RIGHT-SIDED SCIATICA: ICD-10-CM

## 2025-03-21 DIAGNOSIS — G89.29 CHRONIC RIGHT-SIDED LOW BACK PAIN WITH RIGHT-SIDED SCIATICA: ICD-10-CM

## 2025-03-21 DIAGNOSIS — M70.61 TROCHANTERIC BURSITIS OF RIGHT HIP: Primary | ICD-10-CM

## 2025-03-21 DIAGNOSIS — M15.8 OTHER OSTEOARTHRITIS INVOLVING MULTIPLE JOINTS: ICD-10-CM

## 2025-03-21 PROCEDURE — 99999 PR PBB SHADOW E&M-EST. PATIENT-LVL III: CPT | Mod: PBBFAC,,, | Performed by: FAMILY MEDICINE

## 2025-03-21 RX ORDER — IBUPROFEN 800 MG/1
800 TABLET ORAL 3 TIMES DAILY
Qty: 30 TABLET | Refills: 0 | Status: SHIPPED | OUTPATIENT
Start: 2025-03-21 | End: 2025-03-31

## 2025-03-21 RX ORDER — TRIAMCINOLONE ACETONIDE 40 MG/ML
40 INJECTION, SUSPENSION INTRA-ARTICULAR; INTRAMUSCULAR ONCE
Status: COMPLETED | OUTPATIENT
Start: 2025-03-21 | End: 2025-03-21

## 2025-03-21 RX ADMIN — TRIAMCINOLONE ACETONIDE 40 MG: 40 INJECTION, SUSPENSION INTRA-ARTICULAR; INTRAMUSCULAR at 04:03

## 2025-03-21 NOTE — PROGRESS NOTES
(Portions of this note were dictated using voice recognition software and may contain dictation related errors in spelling/grammar/syntax not found on text review)    CC:   Chief Complaint   Patient presents with    Leg Pain     Right leg pain, discuss medications       HPI: 64 y.o. female presented with back/hip pain. She has medical history significant for TIA, mixed hyperlipidemia, prediabetes, osteoarthritis of multiple joints, CKD, anxiety and depression.     She was seen recently on 03/17 with hip pain    Interval Hx    She stated that she was again working in the Garden, she was sitting down and pulling up the weeds when she stood up all of a sudden she started to have sharp pain in the right hip, since then it has been constant, located on the right side of the lower back and radiates towards the right hip and the legs, described as shooting and radiating, there is no numbness or tingling, patient stated that with walking she is noticing buckling and hip is coming out of the socket like feeling.  She has a history of arthritis of multiple joints, x-ray was done in 2021 which showed bilateral osteoarthritis of hip joint.    She was sent for x-ray of lumbar spine and hip joint bilaterally, both x-ray showed degenerative changes, physical therapy was advised, patient did not schedule it yet.    Patient stated that lower back pain got worsened with radiation towards the right side of the bought in the right side of the thigh, she can not walk straight as it aggravates the symptoms, pain is severe, not responding to Voltaren tablets 75 mg twice a day along with topical analgesics and lidocaine patches, due to severity of the pain she presented today    She followed up with pain management June/2024, Dr Ku. She was given injection for trochanteric bursitis and there was plan for nerve block, however, patient did not follow up at that point as symptoms improve with physical therapy    No other symptoms or  concerns    Past Medical History:   Diagnosis Date    COVID-19 12/2020    Hyperlipidemia     MI (myocardial infarction)     Stroke        Past Surgical History:   Procedure Laterality Date    ANKLE SURGERY      CHOLECYSTECTOMY         Family History   Problem Relation Name Age of Onset    Obesity Mother      Cancer Mother      Diabetes Mother      Hypertension Mother      Lung cancer Mother      Hypertension Father      Stroke Father      Heart disease Father      Obesity Sister 2     Hyperlipidemia Sister 2     Hypertension Sister 2     Arthritis Sister 2     Obesity Brother 2     Drug abuse Brother 2     Diabetes Brother 2     Heart disease Brother 2     Diabetes Daughter x1     Bipolar disorder Daughter x1     Obesity Daughter x1     No Known Problems Maternal Grandmother      No Known Problems Maternal Grandfather      No Known Problems Paternal Grandmother      No Known Problems Paternal Grandfather         Social History[1]    Lab Results   Component Value Date    WBC 7.13 04/27/2024    HGB 13.8 04/27/2024    HCT 41.6 04/27/2024    MCV 92 04/27/2024     04/27/2024    CHOL 136 04/27/2024    TRIG 107 04/27/2024    HDL 32 (L) 04/27/2024    ALT 23 04/27/2024    AST 24 04/27/2024    BILITOT 0.4 04/27/2024    ALKPHOS 141 (H) 04/27/2024     04/27/2024    K 4.5 04/27/2024     04/27/2024    CREATININE 1.1 04/27/2024    ESTGFRAFRICA >60 02/28/2022    EGFRNONAA 55 (A) 02/28/2022    CALCIUM 9.5 04/27/2024    ALBUMIN 3.8 04/27/2024    BUN 19 04/27/2024    CO2 25 04/27/2024    TSH 2.881 08/10/2023    INR 0.9 10/11/2021    HGBA1C 5.7 (H) 04/27/2024    LDLCALC 82.6 04/27/2024    GLU 90 04/27/2024             Vital signs reviewed  PE:   APPEARANCE: In moderate distress.    HEAD: Normocephalic, atraumatic.  EYES: EOMI.  Conjunctivae noninjected.  NOSE: Mucosa pink. Airway clear.  NECK: Supple with no cervical lymphadenopathy.    CHEST: Good inspiratory effort. Lungs clear to auscultation with no wheezes or  crackles.  CARDIOVASCULAR: Normal S1, S2. No rubs, murmurs, or gallops.  ABDOMEN: Bowel sounds normal. Not distended. Soft. No tenderness or masses. No organomegaly.  EXTREMITIES: No edema, cyanosis, or clubbing. TTP over trochanteric bursa, TTP lumbar spine and right lashaun spinal muscles, TTP right buttock    Review of Systems   Constitutional: Negative.    HENT: Negative.     Respiratory: Negative.     Cardiovascular: Negative.    Gastrointestinal: Negative.    Genitourinary: Negative.    Musculoskeletal:  Positive for arthralgias, back pain and leg pain. Negative for gait problem, joint swelling and joint deformity.   Integumentary:  Negative.   Neurological: Negative.    Psychiatric/Behavioral: Negative.         IMPRESSION  1. Trochanteric bursitis of right hip    2. Chronic right-sided low back pain with right-sided sciatica    3. Other osteoarthritis involving multiple joints            PLAN      1. Trochanteric bursitis of right hip (Primary)    - triamcinolone acetonide injection 40 mg    - ibuprofen (ADVIL,MOTRIN) 800 MG tablet; Take 1 tablet (800 mg total) by mouth 3 (three) times daily. for 10 days  Dispense: 30 tablet; Refill: 0      2. Chronic right-sided low back pain with right-sided sciatica    - triamcinolone acetonide injection 40 mg    - ibuprofen (ADVIL,MOTRIN) 800 MG tablet; Take 1 tablet (800 mg total) by mouth 3 (three) times daily. for 10 days  Dispense: 30 tablet; Refill: 0    Advised to schedule appointment with pain management    Advised to start physical therapy    Topical and oral NSAIDs along with Tylenol as needed      3. Other osteoarthritis involving multiple joints          SCREENINGS        Age/demographic appropriate health maintenance:    Health Maintenance Due   Topic Date Due    RSV Vaccine (Age 60+ and Pregnant patients) (1 - Risk 60-74 years 1-dose series) Never done    Cervical Cancer Screening  06/21/2024    Influenza Vaccine (1) 09/01/2024    COVID-19 Vaccine (1 - 2024-25  season) Never done         Return ED/urgent precautions given      Spent adequate time in obtaining history and explaining differentials     35 minutes spent during this visit of which greater than 50% devoted to face-face counseling and coordination of care regarding diagnosis and management plan       Lucila Carballo   3/21/2025         [1]   Social History  Tobacco Use    Smoking status: Every Day     Current packs/day: 0.50     Average packs/day: 0.5 packs/day for 38.2 years (19.1 ttl pk-yrs)     Types: Cigarettes     Start date: 1987    Smokeless tobacco: Never    Tobacco comments:     quit for 2 years in the middle; weaned self down, now 3 / day   Substance Use Topics    Alcohol use: Yes     Alcohol/week: 1.0 standard drink of alcohol     Types: 1 Standard drinks or equivalent per week     Comment: Socially    Drug use: No

## 2025-03-26 ENCOUNTER — PATIENT MESSAGE (OUTPATIENT)
Dept: FAMILY MEDICINE | Facility: CLINIC | Age: 65
End: 2025-03-26
Payer: MEDICARE

## 2025-03-27 ENCOUNTER — HOSPITAL ENCOUNTER (EMERGENCY)
Facility: HOSPITAL | Age: 65
Discharge: HOME | End: 2025-03-27
Attending: EMERGENCY MEDICINE
Payer: MEDICARE

## 2025-03-27 VITALS
HEART RATE: 64 BPM | BODY MASS INDEX: 30.76 KG/M2 | DIASTOLIC BLOOD PRESSURE: 61 MMHG | HEIGHT: 67 IN | WEIGHT: 196 LBS | OXYGEN SATURATION: 96 % | SYSTOLIC BLOOD PRESSURE: 128 MMHG | RESPIRATION RATE: 20 BRPM | TEMPERATURE: 98 F

## 2025-03-27 DIAGNOSIS — M54.31 SCIATICA OF RIGHT SIDE: ICD-10-CM

## 2025-03-27 DIAGNOSIS — M54.41 RIGHT-SIDED LOW BACK PAIN WITH RIGHT-SIDED SCIATICA, UNSPECIFIED CHRONICITY: Primary | ICD-10-CM

## 2025-03-27 DIAGNOSIS — M70.61 TROCHANTERIC BURSITIS OF RIGHT HIP: ICD-10-CM

## 2025-03-27 PROCEDURE — 96372 THER/PROPH/DIAG INJ SC/IM: CPT

## 2025-03-27 PROCEDURE — 99285 EMERGENCY DEPT VISIT HI MDM: CPT | Mod: 25

## 2025-03-27 PROCEDURE — 63600175 PHARM REV CODE 636 W HCPCS

## 2025-03-27 RX ORDER — METHYLPREDNISOLONE 4 MG/1
4 TABLET ORAL DAILY
Qty: 10 TABLET | Refills: 0 | Status: SHIPPED | OUTPATIENT
Start: 2025-03-27 | End: 2025-04-06

## 2025-03-27 RX ORDER — DEXAMETHASONE SODIUM PHOSPHATE 4 MG/ML
12 INJECTION, SOLUTION INTRA-ARTICULAR; INTRALESIONAL; INTRAMUSCULAR; INTRAVENOUS; SOFT TISSUE
Status: COMPLETED | OUTPATIENT
Start: 2025-03-27 | End: 2025-03-27

## 2025-03-27 RX ORDER — DICLOFENAC SODIUM 50 MG/1
50 TABLET, DELAYED RELEASE ORAL 2 TIMES DAILY
Qty: 28 TABLET | Refills: 0 | Status: SHIPPED | OUTPATIENT
Start: 2025-03-27 | End: 2025-03-31

## 2025-03-27 RX ADMIN — DEXAMETHASONE SODIUM PHOSPHATE 12 MG: 4 INJECTION, SOLUTION INTRA-ARTICULAR; INTRALESIONAL; INTRAMUSCULAR; INTRAVENOUS; SOFT TISSUE at 10:03

## 2025-03-27 NOTE — ED PROVIDER NOTES
"Encounter Date: 3/27/2025       History     Chief Complaint   Patient presents with    Leg Pain     C/o worsening R hip and RLE pain x1 week. Pt has apt w/ pain management on June 12th. Pt was seen by PCP last week w/o pain relief. Took ibuprofen at 0730 w/ mild relief.      64-year-old female with history of hyperlipidemia, MI, stroke, trochanteric bursitis, osteoarthritis of the right hip presents today for evaluation of right-sided low back and right hip pain that radiates to her buttock, groin, thigh. She reports feeling like her "hip wants to go out of socket." She has had pain like this in the past, states this feels similar but worse.  She reports it is typically relieved with ibuprofen, heat, ice but this time it has persisted.  She did have a fall 2 days before her pain began.  Reports tripping over her laundry basket and falling onto her right buttock from a standing position.  She did not have pain at that time.  She spent the next 2 days gardening which then caused her discomfort.  She has been alternating ibuprofen and Tylenol every 4 hours.  She did see her PCP or x-rays of the lumbar spine and right hip revealed degenerative changes and osteoarthritis.  She also received IM Kenalog injection that did not provide much relief.  She is now ambulating with a cane, normally ambulates without assistance.  She denies any abdominal pain, weakness, numbness, saddle anesthesia, bladder or bowel incontinence.    The history is provided by the patient and medical records. No  was used.     Review of patient's allergies indicates:   Allergen Reactions    Codeine Itching     Past Medical History:   Diagnosis Date    COVID-19 12/2020    Hyperlipidemia     MI (myocardial infarction)     Stroke      Past Surgical History:   Procedure Laterality Date    ANKLE SURGERY      CHOLECYSTECTOMY       Family History   Problem Relation Name Age of Onset    Obesity Mother      Cancer Mother      Diabetes " Mother      Hypertension Mother      Lung cancer Mother      Hypertension Father      Stroke Father      Heart disease Father      Obesity Sister 2     Hyperlipidemia Sister 2     Hypertension Sister 2     Arthritis Sister 2     Obesity Brother 2     Drug abuse Brother 2     Diabetes Brother 2     Heart disease Brother 2     Diabetes Daughter x1     Bipolar disorder Daughter x1     Obesity Daughter x1     No Known Problems Maternal Grandmother      No Known Problems Maternal Grandfather      No Known Problems Paternal Grandmother      No Known Problems Paternal Grandfather       Social History[1]  Review of Systems   Constitutional:  Negative for fever.   HENT:  Negative for sore throat.    Respiratory:  Negative for shortness of breath.    Cardiovascular:  Negative for chest pain.   Gastrointestinal:  Negative for nausea.   Genitourinary:  Negative for dysuria.   Musculoskeletal:  Positive for arthralgias, back pain and gait problem.   Neurological:  Negative for weakness.       Physical Exam     Initial Vitals [03/27/25 0856]   BP Pulse Resp Temp SpO2   128/61 64 20 97.6 °F (36.4 °C) 96 %      MAP       --         Physical Exam    Nursing note and vitals reviewed.  Constitutional: She appears well-developed and well-nourished. She is not diaphoretic.  Non-toxic appearance. No distress.   Neck: Neck supple.   Cardiovascular:  Normal rate and intact distal pulses.           Pulmonary/Chest: No respiratory distress.   Abdominal: Abdomen is soft. She exhibits no distension. There is no abdominal tenderness.   Musculoskeletal:      Cervical back: Normal and neck supple.      Thoracic back: Normal.      Lumbar back: Normal.        Legs:      Neurological: She is alert and oriented to person, place, and time. GCS score is 15. GCS eye subscore is 4. GCS verbal subscore is 5. GCS motor subscore is 6.   Skin: Skin is warm and dry. Capillary refill takes less than 2 seconds.   Psychiatric: She has a normal mood and affect.  Her behavior is normal.         ED Course   Procedures  Labs Reviewed - No data to display       Imaging Results              CT Hip Without Contrast Right (Final result)  Result time 03/27/25 11:00:18      Final result by Saqib Nielsen MD (03/27/25 11:00:18)                   Impression:      No acute hip fracture.      Electronically signed by: Saqib Nielsen  Date:    03/27/2025  Time:    11:00               Narrative:    EXAMINATION:  CT HIP WITHOUT CONTRAST RIGHT    CLINICAL HISTORY:  Hip pain, chronic, impingement suspected, xray done;    TECHNIQUE:  Thin axial images were obtained through the right hip.  Coronal and sagittal images were reviewed.  No intravenous contrast was administered.    COMPARISON:  Pelvis and hip radiograph dated 03/17/2025    FINDINGS:  No acute displaced fracture, dislocation, or osseous destruction.  Hip joint space narrowing with mild DJD.  Lower lumbar spondylosis.  No soft tissue collection.  Limited assessment of lower intrapelvic structures except to note lack of significant free fluid or groin adenopathy.  Iliac atherosclerosis.                                       CT Lumbar Spine Without Contrast (Final result)  Result time 03/27/25 12:04:11      Final result by Saqib Nielsen MD (03/27/25 12:04:11)                   Impression:      No evidence for acute lumbar spine fracture.    Multilevel lumbar spondylosis as detailed.  This can be further correlated with outpatient lumbar spine MRI (unless contraindicated for any reason).    Additional findings as above.      Electronically signed by: Saqib Nielsen  Date:    03/27/2025  Time:    12:04               Narrative:    EXAMINATION:  CT LUMBAR SPINE WITHOUT CONTRAST    CLINICAL HISTORY:  Low back pain, increased fracture risk;    TECHNIQUE:  Low-dose axial, sagittal and coronal reformations are obtained through the lumbar spine.  Contrast was not administered.    COMPARISON:  Lumbar spine radiograph dated  03/17/2025    FINDINGS:  Lumbar spine vertebral body heights appear maintained.  No evidence for acute lumbar spine fracture.  Multilevel discogenic change with endplate spurring and lower facet DJD.  Degree of circumferential bulge in-part contributing to variable spinal canal stenosis most notable at L4-L5 and to a lesser degree at L2-L3 and L3-L4.  Levels of neural foraminal narrowing most pronounced at L5-S1 with likely encroachment.  SI joint DJD.  Aortoiliac atherosclerosis and prior cholecystectomy.  Nonobstructing left renal stones.  Few colonic diverticula.                                       Medications   dexAMETHasone injection 12 mg (12 mg Intramuscular Given 3/27/25 1019)     Medical Decision Making  64-year-old female with history of hyperlipidemia, MI, stroke, trochanteric bursitis, osteoarthritis of the right hip presents today for evaluation of right-sided low back and right hip pain that radiates to her buttock, groin, thigh.  On exam she is in no acute distress.  Vitals are stable.  She has tenderness over the right SI joint as well at the greater trochanter on the right.  She has a small abrasion to her right buttock and a small area of ecchymosis just above her right hip.  No evidence of infection.  Skin is otherwise clean, dry, intact.  Abdomen is soft and nontender, nondistended.  Strength of bilateral lower extremities is symmetrical.  Distal pulses intact and equal.    Differential includes but is not limited to: hip fracture, Degenerative disc disease, facet syndrome, muscle spasms, herniated disc, sciatica, ligament injury, vertebral fracture, spinal stenosis, spondylolisthesis, piriformis syndrome, osteomyelitis, epidural abscess, cancer, cauda equina syndrome, AAA    No acute findings on recent x-ray of hips/pelvis and lumbar spine.  No acute findings on CT hip or L-spine today.  She does have multilevel lumbar spondylosis which is likely cause of her discomfort.  She was given a  Decadron injection today, and sent home on diclofenac on a Medrol Dosepak.  She also has a pain management appointment scheduled in June.  I encouraged she follow up with her PCP, otherwise return to the ED for new or worsening.        Amount and/or Complexity of Data Reviewed  Radiology:  Decision-making details documented in ED Course.    Risk  Prescription drug management.               ED Course as of 03/28/25 0815   Thu Mar 27, 2025   0917 X-Ray Hips Bilateral 2 View Incl AP Pelvis (3/18/25 1220)  FINDINGS:  Mild bilateral hip joint space narrowing.  Normal bilateral femoral head contour.  No fracture, no osseous lesions, no advanced degenerative changes.  Mild sclerosis of the left sacroiliac joint.  The remainder of the visualized osseous structures and soft tissues appear normal.     Impression:     Mild degenerative changes, no acute process seen.   [EP]   0917 X-Ray Lumbar Spine AP And Lateral (3/18/25 1219)  FINDINGS:  The alignment of the lumbar spine is normal.     The vertebral body heights are well maintained.     Mild disc space narrowing L3-L4 and L4-5, moderate disc space narrowing L5-S1.     Small anterior osteophytes noted L2 through L4     Mild sclerosis and osseous hypertrophy of the facet joints at L4-5, L5-S1.     No fracture, no osseous lesions.     Mild sclerosis at the left sacroiliac joint suggestive of degenerative changes.     Surgical clips right upper abdominal quadrant.  PFO closure device.     Sizable air-fluid collection right upper abdominal quadrant could relate to a air fluid filled duodenal diverticulum, about 7 cm diameter.     Significant atherosclerotic plaque of the aorta and iliac vessels.     Impression:     Spondylosis of the lumbar spine, no acute process seen.   [EP]   1116 CT Hip Without Contrast Right  No acute hip fracture.   [EP]   1216 CT Lumbar Spine Without Contrast  No evidence for acute lumbar spine fracture.     Multilevel lumbar spondylosis as detailed.   This can be further correlated with outpatient lumbar spine MRI (unless contraindicated for any reason).   [EP]      ED Course User Index  [EP] Shannan Knight PA-C                           Clinical Impression:  Final diagnoses:  [M54.31] Sciatica of right side  [M54.41] Right-sided low back pain with right-sided sciatica, unspecified chronicity (Primary)  [M70.61] Trochanteric bursitis of right hip          ED Disposition Condition    Discharge Stable          ED Prescriptions       Medication Sig Dispense Start Date End Date Auth. Provider    diclofenac (VOLTAREN) 50 MG EC tablet Take 1 tablet (50 mg total) by mouth 2 (two) times daily. for 14 days 28 tablet 3/27/2025 4/10/2025 Shannan Knight PA-C    methylPREDNISolone (MEDROL) 4 MG Tab Take 1 tablet (4 mg total) by mouth once daily. for 10 days 10 tablet 3/27/2025 4/6/2025 Shannan Knight PA-C          Follow-up Information       Follow up With Specialties Details Why Contact Info    Lucila Carballo MD Family Medicine Schedule an appointment as soon as possible for a visit   [address]  [address]  [facility] [address]  [phone]      [facility] Emergency Dept Emergency Medicine  If symptoms worsen [address] [facility] [id number] [phone]                 [1]   Social History  Tobacco Use    Smoking status: Every Day     Current packs/day: 0.50     Average packs/day: 0.5 packs/day for 38.2 years (19.1 ttl pk-yrs)     Types: Cigarettes     Start date: 1987    Smokeless tobacco: Never    Tobacco comments:     quit for 2 years in the middle; weaned self down, now 3 / day   Substance Use Topics    Alcohol use: Yes     Alcohol/week: 1.0 standard drink of alcohol     Types: 1 Standard drinks or equivalent per week     Comment: Socially    Drug use: No        Shannan Knight PA-C  03/28/25 0886

## 2025-03-27 NOTE — FIRST PROVIDER EVALUATION
Emergency Department TeleTriage Encounter Note      CHIEF COMPLAINT    Chief Complaint   Patient presents with    Leg Pain     C/o worsening R hip and RLE pain x1 week. Pt has apt w/ pain management on June 12th. Pt was seen by PCP last week w/o pain relief. Took ibuprofen at 0730 w/ mild relief.        VITAL SIGNS   Initial Vitals [03/27/25 0856]   BP Pulse Resp Temp SpO2   128/61 64 20 97.6 °F (36.4 °C) 96 %      MAP       --            ALLERGIES    Review of patient's allergies indicates:   Allergen Reactions    Codeine Itching       PROVIDER TRIAGE NOTE  History of bursitis and was seen by PCP on 3-21-25 with back/hip pain. Was given triamcinolone IM and ibuprofen. States she continues to have pain. Denies new injury since visit and imaging last week. Denies saddle anesthesia, bowel/bladder incontinence.     Limited physical exam via telehealth: The patient is awake, alert, answering questions appropriately and is not in respiratory distress.  As the Teletriage provider, I performed an initial assessment and ordered appropriate labs and imaging studies, if any, to facilitate the patient's care once placed in the ED. Once a room is available, care and a full evaluation will be completed by an alternate ED provider.  Any additional orders and the final disposition will be determined by that provider.  All imaging and labs will not be followed-up by the Teletriage Team, including myself.          ORDERS  Labs Reviewed - No data to display    ED Orders (720h ago, onward)      Start Ordered     Status Ordering Provider    03/27/25 0906 03/27/25 0906  CT Lumbar Spine Without Contrast  1 time imaging         Ordered JOSE LUIS KRAUSE    03/27/25 0905 03/27/25 0906  CT Hip Without Contrast Right  1 time imaging         Ordered JOSE LUIS KRAUSE              Virtual Visit Note: The provider triage portion of this emergency department evaluation and documentation was performed via [employer], a HIPAA-compliant  telemedicine application, in concert with a tele-presenter in the room. A face to face patient evaluation with one of my colleagues will occur once the patient is placed in an emergency department room.      DISCLAIMER: This note was prepared with InVisM voice recognition transcription software. Garbled syntax, mangled pronouns, and other bizarre constructions may be attributed to that software system.

## 2025-03-27 NOTE — ED NOTES
Psych: No acute distress is noted. Pt is calm and cooperative, good eye contact.    HEENT: Denies HEENT complaint or injury. No HA, no dizziness, no blurred vision    LOC: The patient is awake, alert and aware of environment with an appropriate affect.    SKIN: The skin is warm, dry and intact. Patient has normal skin turgor and moist mucus membranes, no rashes or lesions. No Breakdown noted.    MUSCULOSKELETAL:  c/o right groin and right hip pain. Denies fall or trauma    RESPIRATORY: Airway is open and patent, respirations are spontaneous; patient has a normal effort and rate. Pink nailbeds. Clear BBS noted. Denies SOB    PULSES: 2+  And symmetrical in all extremities, strong and bounding

## 2025-03-28 ENCOUNTER — NURSE TRIAGE (OUTPATIENT)
Dept: ADMINISTRATIVE | Facility: CLINIC | Age: 65
End: 2025-03-28
Payer: MEDICARE

## 2025-03-28 NOTE — TELEPHONE ENCOUNTER
Patient was recently seen in the ER for lower back pain and sciatica. She has questions about her symptoms and recent visit. Advised per protocol to continue to monitor symptoms at home. All questions and concerns were addressed. Advised the patient to call back with any further questions or if symptoms worsen.        Reason for Disposition   Health information question, no triage required and triager able to answer question    Protocols used: Information Only Call - No Triage-A-

## 2025-03-31 ENCOUNTER — OFFICE VISIT (OUTPATIENT)
Dept: PAIN MEDICINE | Facility: CLINIC | Age: 65
End: 2025-03-31
Payer: MEDICARE

## 2025-03-31 ENCOUNTER — TELEPHONE (OUTPATIENT)
Dept: PAIN MEDICINE | Facility: CLINIC | Age: 65
End: 2025-03-31

## 2025-03-31 DIAGNOSIS — M16.11 PRIMARY OSTEOARTHRITIS OF RIGHT HIP: ICD-10-CM

## 2025-03-31 DIAGNOSIS — M54.16 LUMBAR RADICULOPATHY: Primary | ICD-10-CM

## 2025-03-31 DIAGNOSIS — M54.16 LUMBAR RADICULOPATHY: ICD-10-CM

## 2025-03-31 DIAGNOSIS — M70.61 TROCHANTERIC BURSITIS OF RIGHT HIP: ICD-10-CM

## 2025-03-31 DIAGNOSIS — M54.9 DORSALGIA, UNSPECIFIED: Primary | ICD-10-CM

## 2025-03-31 PROCEDURE — 98006 SYNCH AUDIO-VIDEO EST MOD 30: CPT | Mod: 95,,, | Performed by: STUDENT IN AN ORGANIZED HEALTH CARE EDUCATION/TRAINING PROGRAM

## 2025-03-31 RX ORDER — METHOCARBAMOL 750 MG/1
750-1500 TABLET, FILM COATED ORAL 2 TIMES DAILY PRN
Qty: 80 TABLET | Refills: 0 | Status: SHIPPED | OUTPATIENT
Start: 2025-03-31 | End: 2025-04-21

## 2025-03-31 NOTE — PROGRESS NOTES
Chronic Pain - f/u    Referring Physician: No ref. provider found    Date: 03/31/2025     Re: Maria M Mei  MR#: [id number]  YOB: 1960  Age: 64 y.o.    Chief Complaint:   No chief complaint on file.    **This note is dictated using the M*Modal Fluency Direct word recognition program. There are word recognition mistakes that are occasionally missed on review.**    ASSESSMENT: 64 y.o. year old female with right buttock/thigh and tailbone pain, consistent with     1. Dorsalgia, unspecified  MRI Lumbar Spine Without Contrast      2. Lumbar radiculopathy  MRI Lumbar Spine Without Contrast    methocarbamoL (ROBAXIN) 750 MG Tab      3. Trochanteric bursitis of right hip        4. Primary osteoarthritis of right hip            PLAN:     Right lumbar radiculopathy  4/15/25 - Right L4-5, L5-S1 TFESI (may adjust level based on MRI) - RN sed - no Ac  -new MRI lumbar  -if TFESI not helpful then consider Piriformis vs GTB injection  -Trial methocarbamol 750-1500mg  -continue ibuprofen  -finish medrol dose jamila    Traumatic coccydynia - resolved  -continue aspercream and lidocaine patches  -completed PT  -tylenol and ibuprofen was helpful    Right trochanteric bursitis  7/18/24 @845 - R GTB - 100% x6m  -continue Diclofenac 75mg BID PRN. STOP while taking ibuprofen  -discussed bursa block and ablation as possible future treatment if needed.    Right hip pain  -patient has mild OA and mild (+) provocative maneuvers  -suspect that this may also be causing some underlying right bursitis issues.    - RTC 3 months  - Counseled patient regarding the importance of weight loss and activity modification and physical therapy.    The above plan and management options were discussed at length with patient. Patient is in agreement with the above and verbalized understanding. It will be communicated with the referring physician via electronic record, fax, or mail.  Lab/study reports reviewed were important and necessary because  subsequent medical and treatment recommendations required review of the above lab/study reports. Images viewed/reviewed above were important and necessary because subsequent medical and treatment recommendations required review of the reviewed image(s).     Electronically signed by:  Kings Mooney DO  03/31/2025    =========================================================================================================    SUBJECTIVE:    Chronic Pain-Tele-Medicine     The patient location is: Home  The chief complaint leading to consultation is: Pain  Visit type: Virtual visit with synchronous audio and video  Total time spent with patient: 25 min  Each patient to whom he or she provides medical services by telemedicine is:  (1) informed of the relationship between the physician and patient and the respective role of any other health care provider with respect to management of the patient; and (2) notified that he or she may decline to receive medical services by telemedicine and may withdraw from such care at any time.    Interval History 3/31/2025:   Maria M Mei is a 64 y.o. female presents to the clinic for follow up.  Since last visit the pain has has significantly worsened. The patient went to the ER on 3/27.  She get stabbing in her back.  The pain radiates down the side of her leg to the knee.  This is radiating down the right side.  This started about 1 month ago, but it got really worse around 3/17.  Patient went to PCP and got steroid shot. She went top the ED and got prednisone and another steroid shot.      Current pain medications: Medrol dose jamila, 800mg ibuprofen BID, seroquel 25mg qhs, Tylenol 650mg BID,   Failed Pain Medications: diclofenac 50mg, gabapentin (not helpful for buttock pain)    Pain Procedures:  7/18/24 @845 - R GTB - 100%X6m    Interval History 8/29/2024:   Maria M Mei is a 64 y.o. female presents to the clinic for follow up.  Since last visit the pain has has  "improved.    The pain is located in the gluteal area and tail bone area and radiates to the right hip and groin area .  The pain is described as sharp and shooting    At BEST  2/10   At WORST  4/10 on the WORST day.    On average pain is rated as 2/10.   Today the pain is rated as 3/10  Symptoms interfere with daily activity and sleeping.   Exacerbating factors: Sitting and Walking.    Mitigating factors medications.     Current pain medications: tylenol, diclofenac 75mg BID  Failed Pain Medications: gabapentin (not helpful for buttock pain)    Pain Procedures:  7/18/24 @845 - R GTB - 100%X2W    Initial hx:  Maria M Mei is a 64 y.o. female presents to the clinic for the evaluation of right buttock pain. The pain started 2 years ago following no inciting event and symptoms have been worsening.  The patient states that she has been having a lot of right buttock pain for a couple years since she had an achilles injury and was in a boot for an extended period.      Then on 6/10/24 she slipped and landed on her butt. Since then her tailbone has been really painful.  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  She had an XR at the ER and it did not show any fracture.  The pain has improved somewhat since 6/10.  The pain is just "there" all the time and worse with prolonged sitting.  She has not started PT yet, but it is scheduled.     Pain Description:    The pain is located in the buttock and tail bone area and radiates to the right groin area .    At BEST  6/10   At WORST  10/10 on the WORST day.    On average pain is rated as 7/10.   Today the pain is rated as 8/10  The pain is intermittent.  The pain is described as sharp and shooting    Symptoms interfere with daily activity and sleeping.   Exacerbating factors: Sitting and Walking.    Mitigating factors medications.   She reports 6 hours of sleep per night.    Physical Therapy/Home Exercise: Yes, currently has a home exercise " program    Current Pain Medications:    - Percocet 5mg (doesn't take), tylenol, ibuprofen    Failed Pain Medications:    - gabapentin (not helpful for buttock pain)    Pain Treatment Therapies:    Pain procedures: none  Physical Therapy: none  Chiropractor: none  Acupuncture: none  TENS unit: none  Spinal decompression: none  Joint replacement: none    Patient denies urinary incontinence, bowel incontinence, significant motor weakness, and loss of sensations.  Patient denies any suicidal or homicidal ideations     report:  Reviewed and consistent with medication use as prescribed.    Imaging:   XR Sacrum 06/11/24:    Sclerotic changes of the SI joints bilaterally, left-sided greater than right.  No erosions.  No evidence of acute fracture or dislocation.  A few pelvic phleboliths.         8/29/2024     8:11 AM 6/26/2024     9:03 AM   Pain Disability Index (PDI)   Family/Home Responsibilities: 2 8   Recreation: 2 8   Occupation: 2 8   Sexual Behavior: 2 8   Self Care: 2 8   Life-Support Activities: 2 8   Pain Disability Index (PDI) 14 56        Past Medical History:   Diagnosis Date    COVID-19 12/2020    Hyperlipidemia     MI (myocardial infarction)     Stroke      Past Surgical History:   Procedure Laterality Date    ANKLE SURGERY      CHOLECYSTECTOMY       Social History     Socioeconomic History    Marital status: Single   Tobacco Use    Smoking status: Every Day     Current packs/day: 0.50     Average packs/day: 0.5 packs/day for 38.2 years (19.1 ttl pk-yrs)     Types: Cigarettes     Start date: 1987    Smokeless tobacco: Never    Tobacco comments:     quit for 2 years in the middle; weaned self down, now 3 / day   Substance and Sexual Activity    Alcohol use: Yes     Alcohol/week: 1.0 standard drink of alcohol     Types: 1 Standard drinks or equivalent per week     Comment: Socially    Drug use: No    Sexual activity: Not Currently     Social Drivers of Health     Financial Resource Strain: Low Risk   (4/24/2023)    Overall Financial Resource Strain (CARDIA)     Difficulty of Paying Living Expenses: Not hard at all   Food Insecurity: No Food Insecurity (4/24/2023)    Hunger Vital Sign     Worried About Running Out of Food in the Last Year: Never true     Ran Out of Food in the Last Year: Never true   Transportation Needs: No Transportation Needs (4/24/2023)    PRAPARE - Transportation     Lack of Transportation (Medical): No     Lack of Transportation (Non-Medical): No   Physical Activity: Insufficiently Active (4/24/2023)    Exercise Vital Sign     Days of Exercise per Week: 2 days     Minutes of Exercise per Session: 60 min   Stress: No Stress Concern Present (4/24/2023)    [employer] - Occupational Stress Questionnaire     Feeling of Stress : Only a little   Housing Stability: Low Risk  (4/24/2023)    Housing Stability Vital Sign     Unable to Pay for Housing in the Last Year: No     Number of Places Lived in the Last Year: 1     Unstable Housing in the Last Year: No     Family History   Problem Relation Name Age of Onset    Obesity Mother      Cancer Mother      Diabetes Mother      Hypertension Mother      Lung cancer Mother      Hypertension Father      Stroke Father      Heart disease Father      Obesity Sister 2     Hyperlipidemia Sister 2     Hypertension Sister 2     Arthritis Sister 2     Obesity Brother 2     Drug abuse Brother 2     Diabetes Brother 2     Heart disease Brother 2     Diabetes Daughter x1     Bipolar disorder Daughter x1     Obesity Daughter x1     No Known Problems Maternal Grandmother      No Known Problems Maternal Grandfather      No Known Problems Paternal Grandmother      No Known Problems Paternal Grandfather         Review of patient's allergies indicates:   Allergen Reactions    Codeine Itching       Current Outpatient Medications   Medication Sig    albuterol (VENTOLIN HFA) 90 mcg/actuation inhaler Inhale 2 puffs into the lungs every 6 (six)  hours as needed for Wheezing or Shortness of Breath.  Rescue    ascorbic acid, vitamin C, (VITAMIN C) 250 MG tablet Take 250 mg by mouth once daily.    aspirin 81 MG Chew Chew and swallow 1 tablet (81 mg total) by mouth once daily.    atorvastatin (LIPITOR) 40 MG tablet TAKE 1 TABLET ONE TIME DAILY    diclofenac (VOLTAREN) 50 MG EC tablet Take 1 tablet (50 mg total) by mouth 2 (two) times daily. for 14 days    EScitalopram oxalate (LEXAPRO) 5 MG Tab Take 1 tablet (5 mg total) by mouth once daily.    fluticasone propionate (FLONASE) 50 mcg/actuation nasal spray 2 sprays (100 mcg total) by Each Nostril route once daily.    gabapentin (NEURONTIN) 300 MG capsule Take 1 capsule (300 mg total) by mouth every evening. (Patient not taking: Reported on 3/17/2025)    methocarbamoL (ROBAXIN) 750 MG Tab Take 1-2 tablets (750-1,500 mg total) by mouth 2 (two) times daily as needed (muscle /  buttock / back pain).    methylPREDNISolone (MEDROL) 4 MG Tab Take 1 tablet (4 mg total) by mouth once daily. for 10 days    nicotine (NICODERM CQ) 21 mg/24 hr Place 1 patch onto the skin once daily.    nicotine, polacrilex, (NICORETTE) 2 mg Gum Take 1 each (2 mg total) by mouth as needed (as needed).    oxyCODONE-acetaminophen (PERCOCET) 5-325 mg per tablet     pantoprazole (PROTONIX) 40 MG tablet TAKE 1 TABLET EVERY DAY    QUEtiapine (SEROQUEL) 25 MG Tab Take 1 tablet (25 mg total) by mouth nightly as needed (sleep).    traZODone (DESYREL) 50 MG tablet Take 1 tablet (50 mg total) by mouth every evening.    vitamin D (VITAMIN D3) 1000 units Tab Take 1,000 Units by mouth once daily.    zinc gluconate 50 mg tablet Take 50 mg by mouth once daily.     No current facility-administered medications for this visit.       REVIEW OF SYSTEMS:    GENERAL:  No weight loss, malaise or fevers.  HEENT:   No recent changes in vision or hearing  NECK:  Negative for lumps, no difficulty with swallowing.  RESPIRATORY:  Negative for cough, wheezing or shortness  of breath, patient denies any recent URI.  CARDIOVASCULAR:  Negative for chest pain, leg swelling or palpitations.  GI:  Negative for abdominal discomfort, blood in stools or black stools or change in bowel habits.  MUSCULOSKELETAL:  See HPI.  SKIN:  Negative for lesions, rash, and itching.  PSYCH:  No mood disorder or recent psychosocial stressors.  Patients sleep is not disturbed secondary to pain.  HEMATOLOGY/LYMPHOLOGY:  Negative for prolonged bleeding, bruising easily or swollen nodes.  Patient is not currently taking any anti-coagulants  NEURO:   No history of headaches, syncope, paralysis, seizures or tremors.  All other reviewed and negative other than HPI.    OBJECTIVE:    There were no vitals taken for this visit.    PHYSICAL EXAMINATION:  Video visit  GENERAL: Well appearing, in no acute distress, alert and oriented x3.  PSYCH:  Mood and affect appropriate.  SKIN: Skin color, texture, turgor normal, no rashes or lesions.  HEAD/FACE:  Normocephalic, atraumatic. Cranial nerves grossly intact.  CV: RRR with palpation of the radial artery.    Previous in-person visit  PULM: CTAB. No evidence of respiratory difficulty, symmetric chest rise.  GI:  Soft and non-tender.    BACK:   - No obvious deformity or signs of trauma, Normal lumbar lordotic curve  - Negative spinous process tenderness  - Negative paravertebral tenderness  - Negative pain to palpation over the facet joints of the lumbar spine.   - Positive QL / Iliac crest / Glut tenderness on the right  - Slump test is Negative for radicular pain  - Slump test is Negative for back pain  - Supine Straight leg raising is Negative for radicular pain  - Supine Straight leg raising is Negative for back pain  - Lumbar ROM is diminished in Flexion with pain  - Lumbar ROM is normal in Extension without pain  - Did not perform Sustained Hip Flexion test (for discogenic pain)  - Positive Altered Gait, Posture  - Axial facet loading test Negative on the bilateral  side(s)    SI Joint exam:  - Positive SI joint tenderness to palpation  - Bull's sign Negative  - Yeoman's Test: Did not perform for SI joint pain indicating anterior SI ligament involvement. Did not perform for anterior thigh pain/paresthesia which indicates femoral nerve stretch.  - Gaenslen's Test:Negative  - Finger Betty's Sign:Negative  - SI compression test:Did not perform  - SI distraction test:Negative  - Thigh Thrust: Did not perform  - SI Thrust: Did not perform    MUSKULOSKELETAL:    EXTREMITIES:   Hip Exam:  - Log Roll Negative   - FADIR Positive  - Stinchfield Positive  - Hip Scour Negative  - GTB Tenderness Positive    MUSCULOSKELETAL:  No atrophy or tone abnormalities are noted in the UE or LE.  No deformities, edema, or skin discoloration are noted on visible skin. Good capillary refill.    NEURO: Bilateral upper and lower extremity coordination and muscle stretch reflexes are physiologic and symmetric.      NEUROLOGICAL EXAM:  MENTAL STATUS: A x O x 3, good concentration, speech is fluent and goal directed  MEMORY: recent and remote are intact  CN: CN2-12 grossly intact  MOTOR: 5/5 in all muscle groups  DTRs: 2+ intact symmetric  Sensation:    -no Loss of sensation in a left lower and right lower  leg  distribution.  Babinski: absent on the bilateral side(s)  Hampton: absent on the bilateral side(s)  Clonus: absent on the bilateral side(s)    GAIT: antalgic.

## 2025-03-31 NOTE — TELEPHONE ENCOUNTER
----- Message from Kings Dumont DO sent at 3/31/2025  2:18 PM CDT -----  Regarding: Order for MOODY TRINIDAD    Patient Name: MOODY TRINIDAD([id number])  Sex: Female  : 1960      PCP: ABILIO BUTLER    Center: \[facility]\""     Level of Service:24545     KS SYNCHRONOUS AUDIO-VIDEO VISIT, EST, MOD MDM 30+                              MIN    Types of orders made on 2025: Imaging, Medications, Procedure Request    Order Date:3/31/2025  Ordering User:KINGS DUMONT [[id number]]  Encounter Provider:Kings Dumont DO [[id number]]  Authorizing Provider: Kings Dumont DO [[id number]]  Department:[facility][[id number]]    Common Order Information  Procedure -> Transforaminal Injection (Specify level and laterality)     Pre-op Diagnosis -> Lumbar radiculopathy     Order Specific Information  Order: Procedure Request Order for Pain Management [Custom: [id number]]  Order #:          [id number]: 1 FUTURE    Priority: Routine  Class: Clinic Performed    Future Order Information      Expires on:2026            Expected by:2025                   Comment:4/15/25 - Right L4-5, L5-S1 TFESI (may adjust level based on MRI) -             RN sed - no Ac    Associated Diagnoses      M54.16 Lumbar radiculopathy      Physician -> brittanyyu         Is patient on anti-coagulants? -> No         Facility Name: -> [facility]           Priority: Routine  Class: Clinic Performed    Future Order Information      Expires on:2026            Expected by:2025                   Comment:4/15/25 - Right L4-5, L5-S1 TFESI (may adjust level based on MRI) -             RN sed - no Ac    Associated Diagnoses      M54.16 Lumbar radiculopathy      Procedure -> Transforaminal Injection (Specify level and laterality)         Physician -> brittanyyu         Is patient on anti-coagulants? -> No         Pre-op Diagnosis -> Lumbar radiculopathy         Facility Name: -> [facility]        Topical Clindamycin Pregnancy And Lactation Text: This medication is Pregnancy Category B and is considered safe during pregnancy. It is unknown if it is excreted in breast milk.

## 2025-03-31 NOTE — H&P (VIEW-ONLY)
Chronic Pain - f/u    Referring Physician: No ref. provider found    Date: 03/31/2025     Re: Maria M Mei  MR#: [id number]  YOB: 1960  Age: 64 y.o.    Chief Complaint:   No chief complaint on file.    **This note is dictated using the M*Modal Fluency Direct word recognition program. There are word recognition mistakes that are occasionally missed on review.**    ASSESSMENT: 64 y.o. year old female with right buttock/thigh and tailbone pain, consistent with     1. Dorsalgia, unspecified  MRI Lumbar Spine Without Contrast      2. Lumbar radiculopathy  MRI Lumbar Spine Without Contrast    methocarbamoL (ROBAXIN) 750 MG Tab      3. Trochanteric bursitis of right hip        4. Primary osteoarthritis of right hip            PLAN:     Right lumbar radiculopathy  4/15/25 - Right L4-5, L5-S1 TFESI (may adjust level based on MRI) - RN sed - no Ac  -new MRI lumbar  -if TFESI not helpful then consider Piriformis vs GTB injection  -Trial methocarbamol 750-1500mg  -continue ibuprofen  -finish medrol dose jamila    Traumatic coccydynia - resolved  -continue aspercream and lidocaine patches  -completed PT  -tylenol and ibuprofen was helpful    Right trochanteric bursitis  7/18/24 @845 - R GTB - 100% x6m  -continue Diclofenac 75mg BID PRN. STOP while taking ibuprofen  -discussed bursa block and ablation as possible future treatment if needed.    Right hip pain  -patient has mild OA and mild (+) provocative maneuvers  -suspect that this may also be causing some underlying right bursitis issues.    - RTC 3 months  - Counseled patient regarding the importance of weight loss and activity modification and physical therapy.    The above plan and management options were discussed at length with patient. Patient is in agreement with the above and verbalized understanding. It will be communicated with the referring physician via electronic record, fax, or mail.  Lab/study reports reviewed were important and necessary because  subsequent medical and treatment recommendations required review of the above lab/study reports. Images viewed/reviewed above were important and necessary because subsequent medical and treatment recommendations required review of the reviewed image(s).     Electronically signed by:  Kings Mooney DO  03/31/2025    =========================================================================================================    SUBJECTIVE:    Chronic Pain-Tele-Medicine     The patient location is: Home  The chief complaint leading to consultation is: Pain  Visit type: Virtual visit with synchronous audio and video  Total time spent with patient: 25 min  Each patient to whom he or she provides medical services by telemedicine is:  (1) informed of the relationship between the physician and patient and the respective role of any other health care provider with respect to management of the patient; and (2) notified that he or she may decline to receive medical services by telemedicine and may withdraw from such care at any time.    Interval History 3/31/2025:   Maria M Mei is a 64 y.o. female presents to the clinic for follow up.  Since last visit the pain has has significantly worsened. The patient went to the ER on 3/27.  She get stabbing in her back.  The pain radiates down the side of her leg to the knee.  This is radiating down the right side.  This started about 1 month ago, but it got really worse around 3/17.  Patient went to PCP and got steroid shot. She went top the ED and got prednisone and another steroid shot.      Current pain medications: Medrol dose jamila, 800mg ibuprofen BID, seroquel 25mg qhs, Tylenol 650mg BID,   Failed Pain Medications: diclofenac 50mg, gabapentin (not helpful for buttock pain)    Pain Procedures:  7/18/24 @845 - R GTB - 100%X6m    Interval History 8/29/2024:   Maria M Mei is a 64 y.o. female presents to the clinic for follow up.  Since last visit the pain has has  "improved.    The pain is located in the gluteal area and tail bone area and radiates to the right hip and groin area .  The pain is described as sharp and shooting    At BEST  2/10   At WORST  4/10 on the WORST day.    On average pain is rated as 2/10.   Today the pain is rated as 3/10  Symptoms interfere with daily activity and sleeping.   Exacerbating factors: Sitting and Walking.    Mitigating factors medications.     Current pain medications: tylenol, diclofenac 75mg BID  Failed Pain Medications: gabapentin (not helpful for buttock pain)    Pain Procedures:  7/18/24 @845 - R GTB - 100%X2W    Initial hx:  Maria M Mei is a 64 y.o. female presents to the clinic for the evaluation of right buttock pain. The pain started 2 years ago following no inciting event and symptoms have been worsening.  The patient states that she has been having a lot of right buttock pain for a couple years since she had an achilles injury and was in a boot for an extended period.      Then on 6/10/24 she slipped and landed on her butt. Since then her tailbone has been really painful.  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  She had an XR at the ER and it did not show any fracture.  The pain has improved somewhat since 6/10.  The pain is just "there" all the time and worse with prolonged sitting.  She has not started PT yet, but it is scheduled.     Pain Description:    The pain is located in the buttock and tail bone area and radiates to the right groin area .    At BEST  6/10   At WORST  10/10 on the WORST day.    On average pain is rated as 7/10.   Today the pain is rated as 8/10  The pain is intermittent.  The pain is described as sharp and shooting    Symptoms interfere with daily activity and sleeping.   Exacerbating factors: Sitting and Walking.    Mitigating factors medications.   She reports 6 hours of sleep per night.    Physical Therapy/Home Exercise: Yes, currently has a home exercise " program    Current Pain Medications:    - Percocet 5mg (doesn't take), tylenol, ibuprofen    Failed Pain Medications:    - gabapentin (not helpful for buttock pain)    Pain Treatment Therapies:    Pain procedures: none  Physical Therapy: none  Chiropractor: none  Acupuncture: none  TENS unit: none  Spinal decompression: none  Joint replacement: none    Patient denies urinary incontinence, bowel incontinence, significant motor weakness, and loss of sensations.  Patient denies any suicidal or homicidal ideations     report:  Reviewed and consistent with medication use as prescribed.    Imaging:   XR Sacrum 06/11/24:    Sclerotic changes of the SI joints bilaterally, left-sided greater than right.  No erosions.  No evidence of acute fracture or dislocation.  A few pelvic phleboliths.         8/29/2024     8:11 AM 6/26/2024     9:03 AM   Pain Disability Index (PDI)   Family/Home Responsibilities: 2 8   Recreation: 2 8   Occupation: 2 8   Sexual Behavior: 2 8   Self Care: 2 8   Life-Support Activities: 2 8   Pain Disability Index (PDI) 14 56        Past Medical History:   Diagnosis Date    COVID-19 12/2020    Hyperlipidemia     MI (myocardial infarction)     Stroke      Past Surgical History:   Procedure Laterality Date    ANKLE SURGERY      CHOLECYSTECTOMY       Social History     Socioeconomic History    Marital status: Single   Tobacco Use    Smoking status: Every Day     Current packs/day: 0.50     Average packs/day: 0.5 packs/day for 38.2 years (19.1 ttl pk-yrs)     Types: Cigarettes     Start date: 1987    Smokeless tobacco: Never    Tobacco comments:     quit for 2 years in the middle; weaned self down, now 3 / day   Substance and Sexual Activity    Alcohol use: Yes     Alcohol/week: 1.0 standard drink of alcohol     Types: 1 Standard drinks or equivalent per week     Comment: Socially    Drug use: No    Sexual activity: Not Currently     Social Drivers of Health     Financial Resource Strain: Low Risk   (4/24/2023)    Overall Financial Resource Strain (CARDIA)     Difficulty of Paying Living Expenses: Not hard at all   Food Insecurity: No Food Insecurity (4/24/2023)    Hunger Vital Sign     Worried About Running Out of Food in the Last Year: Never true     Ran Out of Food in the Last Year: Never true   Transportation Needs: No Transportation Needs (4/24/2023)    PRAPARE - Transportation     Lack of Transportation (Medical): No     Lack of Transportation (Non-Medical): No   Physical Activity: Insufficiently Active (4/24/2023)    Exercise Vital Sign     Days of Exercise per Week: 2 days     Minutes of Exercise per Session: 60 min   Stress: No Stress Concern Present (4/24/2023)    [employer] [place] - Occupational Stress Questionnaire     Feeling of Stress : Only a little   Housing Stability: Low Risk  (4/24/2023)    Housing Stability Vital Sign     Unable to Pay for Housing in the Last Year: No     Number of Places Lived in the Last Year: 1     Unstable Housing in the Last Year: No     Family History   Problem Relation Name Age of Onset    Obesity Mother      Cancer Mother      Diabetes Mother      Hypertension Mother      Lung cancer Mother      Hypertension Father      Stroke Father      Heart disease Father      Obesity Sister 2     Hyperlipidemia Sister 2     Hypertension Sister 2     Arthritis Sister 2     Obesity Brother 2     Drug abuse Brother 2     Diabetes Brother 2     Heart disease Brother 2     Diabetes Daughter x1     Bipolar disorder Daughter x1     Obesity Daughter x1     No Known Problems Maternal Grandmother      No Known Problems Maternal Grandfather      No Known Problems Paternal Grandmother      No Known Problems Paternal Grandfather         Review of patient's allergies indicates:   Allergen Reactions    Codeine Itching       Current Outpatient Medications   Medication Sig    albuterol (VENTOLIN HFA) 90 mcg/actuation inhaler Inhale 2 puffs into the lungs every 6 (six)  hours as needed for Wheezing or Shortness of Breath.  Rescue    ascorbic acid, vitamin C, (VITAMIN C) 250 MG tablet Take 250 mg by mouth once daily.    aspirin 81 MG Chew Chew and swallow 1 tablet (81 mg total) by mouth once daily.    atorvastatin (LIPITOR) 40 MG tablet TAKE 1 TABLET ONE TIME DAILY    diclofenac (VOLTAREN) 50 MG EC tablet Take 1 tablet (50 mg total) by mouth 2 (two) times daily. for 14 days    EScitalopram oxalate (LEXAPRO) 5 MG Tab Take 1 tablet (5 mg total) by mouth once daily.    fluticasone propionate (FLONASE) 50 mcg/actuation nasal spray 2 sprays (100 mcg total) by Each Nostril route once daily.    gabapentin (NEURONTIN) 300 MG capsule Take 1 capsule (300 mg total) by mouth every evening. (Patient not taking: Reported on 3/17/2025)    methocarbamoL (ROBAXIN) 750 MG Tab Take 1-2 tablets (750-1,500 mg total) by mouth 2 (two) times daily as needed (muscle /  buttock / back pain).    methylPREDNISolone (MEDROL) 4 MG Tab Take 1 tablet (4 mg total) by mouth once daily. for 10 days    nicotine (NICODERM CQ) 21 mg/24 hr Place 1 patch onto the skin once daily.    nicotine, polacrilex, (NICORETTE) 2 mg Gum Take 1 each (2 mg total) by mouth as needed (as needed).    oxyCODONE-acetaminophen (PERCOCET) 5-325 mg per tablet     pantoprazole (PROTONIX) 40 MG tablet TAKE 1 TABLET EVERY DAY    QUEtiapine (SEROQUEL) 25 MG Tab Take 1 tablet (25 mg total) by mouth nightly as needed (sleep).    traZODone (DESYREL) 50 MG tablet Take 1 tablet (50 mg total) by mouth every evening.    vitamin D (VITAMIN D3) 1000 units Tab Take 1,000 Units by mouth once daily.    zinc gluconate 50 mg tablet Take 50 mg by mouth once daily.     No current facility-administered medications for this visit.       REVIEW OF SYSTEMS:    GENERAL:  No weight loss, malaise or fevers.  HEENT:   No recent changes in vision or hearing  NECK:  Negative for lumps, no difficulty with swallowing.  RESPIRATORY:  Negative for cough, wheezing or shortness  of breath, patient denies any recent URI.  CARDIOVASCULAR:  Negative for chest pain, leg swelling or palpitations.  GI:  Negative for abdominal discomfort, blood in stools or black stools or change in bowel habits.  MUSCULOSKELETAL:  See HPI.  SKIN:  Negative for lesions, rash, and itching.  PSYCH:  No mood disorder or recent psychosocial stressors.  Patients sleep is not disturbed secondary to pain.  HEMATOLOGY/LYMPHOLOGY:  Negative for prolonged bleeding, bruising easily or swollen nodes.  Patient is not currently taking any anti-coagulants  NEURO:   No history of headaches, syncope, paralysis, seizures or tremors.  All other reviewed and negative other than HPI.    OBJECTIVE:    There were no vitals taken for this visit.    PHYSICAL EXAMINATION:  Video visit  GENERAL: Well appearing, in no acute distress, alert and oriented x3.  PSYCH:  Mood and affect appropriate.  SKIN: Skin color, texture, turgor normal, no rashes or lesions.  HEAD/FACE:  Normocephalic, atraumatic. Cranial nerves grossly intact.  CV: RRR with palpation of the radial artery.    Previous in-person visit  PULM: CTAB. No evidence of respiratory difficulty, symmetric chest rise.  GI:  Soft and non-tender.    BACK:   - No obvious deformity or signs of trauma, Normal lumbar lordotic curve  - Negative spinous process tenderness  - Negative paravertebral tenderness  - Negative pain to palpation over the facet joints of the lumbar spine.   - Positive QL / Iliac crest / Glut tenderness on the right  - Slump test is Negative for radicular pain  - Slump test is Negative for back pain  - Supine Straight leg raising is Negative for radicular pain  - Supine Straight leg raising is Negative for back pain  - Lumbar ROM is diminished in Flexion with pain  - Lumbar ROM is normal in Extension without pain  - Did not perform Sustained Hip Flexion test (for discogenic pain)  - Positive Altered Gait, Posture  - Axial facet loading test Negative on the bilateral  side(s)    SI Joint exam:  - Positive SI joint tenderness to palpation  - Bull's sign Negative  - Yeoman's Test: Did not perform for SI joint pain indicating anterior SI ligament involvement. Did not perform for anterior thigh pain/paresthesia which indicates femoral nerve stretch.  - Gaenslen's Test:Negative  - Finger Betty's Sign:Negative  - SI compression test:Did not perform  - SI distraction test:Negative  - Thigh Thrust: Did not perform  - SI Thrust: Did not perform    MUSKULOSKELETAL:    EXTREMITIES:   Hip Exam:  - Log Roll Negative   - FADIR Positive  - Stinchfield Positive  - Hip Scour Negative  - GTB Tenderness Positive    MUSCULOSKELETAL:  No atrophy or tone abnormalities are noted in the UE or LE.  No deformities, edema, or skin discoloration are noted on visible skin. Good capillary refill.    NEURO: Bilateral upper and lower extremity coordination and muscle stretch reflexes are physiologic and symmetric.      NEUROLOGICAL EXAM:  MENTAL STATUS: A x O x 3, good concentration, speech is fluent and goal directed  MEMORY: recent and remote are intact  CN: CN2-12 grossly intact  MOTOR: 5/5 in all muscle groups  DTRs: 2+ intact symmetric  Sensation:    -no Loss of sensation in a left lower and right lower  leg  distribution.  Babinski: absent on the bilateral side(s)  Hampton: absent on the bilateral side(s)  Clonus: absent on the bilateral side(s)    GAIT: antalgic.

## 2025-04-08 ENCOUNTER — RESULTS FOLLOW-UP (OUTPATIENT)
Dept: PAIN MEDICINE | Facility: CLINIC | Age: 65
End: 2025-04-08

## 2025-04-08 ENCOUNTER — TELEPHONE (OUTPATIENT)
Dept: PAIN MEDICINE | Facility: CLINIC | Age: 65
End: 2025-04-08
Payer: MEDICARE

## 2025-04-08 ENCOUNTER — HOSPITAL ENCOUNTER (OUTPATIENT)
Dept: RADIOLOGY | Facility: HOSPITAL | Age: 65
Discharge: HOME | End: 2025-04-08
Attending: STUDENT IN AN ORGANIZED HEALTH CARE EDUCATION/TRAINING PROGRAM
Payer: MEDICARE

## 2025-04-08 DIAGNOSIS — M54.16 LUMBAR RADICULOPATHY: ICD-10-CM

## 2025-04-08 DIAGNOSIS — M54.9 DORSALGIA, UNSPECIFIED: ICD-10-CM

## 2025-04-08 PROCEDURE — 72148 MRI LUMBAR SPINE W/O DYE: CPT | Mod: 26,,, | Performed by: RADIOLOGY

## 2025-04-08 PROCEDURE — 72148 MRI LUMBAR SPINE W/O DYE: CPT | Mod: TC

## 2025-04-09 DIAGNOSIS — F32.1 CURRENT MODERATE EPISODE OF MAJOR DEPRESSIVE DISORDER WITHOUT PRIOR EPISODE: ICD-10-CM

## 2025-04-09 RX ORDER — ESCITALOPRAM OXALATE 5 MG/1
5 TABLET ORAL DAILY
Qty: 30 TABLET | Refills: 3 | Status: SHIPPED | OUTPATIENT
Start: 2025-04-09 | End: 2025-04-11

## 2025-04-09 NOTE — TELEPHONE ENCOUNTER
----- Message from [employer] sent at 4/9/2025 11:55 AM CDT -----  Type:  RX Refill RequestWho Called: ptRefill or New Rx:refillRX Name and Strength:EScitalopram oxalate (LEXAPRO) 5 MG TabHow is the patient currently taking it? (ex. 1XDay):1xdayIs this a 30 day or 90 day RX:30Preferred Pharmacy with phone number:[facility] Phone: [phone]: [phone] or Mail Order:localOrdering Provider:Debra the patient rather a call back or a response via [facility]? Call [facility] Call Back Number:[phone] Additional Information: Pt advised she is in severe pains and hasn't received her medication yet.

## 2025-04-11 ENCOUNTER — TELEPHONE (OUTPATIENT)
Dept: PAIN MEDICINE | Facility: HOSPITAL | Age: 65
End: 2025-04-11
Payer: MEDICARE

## 2025-04-11 ENCOUNTER — TELEPHONE (OUTPATIENT)
Dept: FAMILY MEDICINE | Facility: CLINIC | Age: 65
End: 2025-04-11
Payer: MEDICARE

## 2025-04-11 DIAGNOSIS — F41.1 GAD (GENERALIZED ANXIETY DISORDER): ICD-10-CM

## 2025-04-11 DIAGNOSIS — F41.1 GAD (GENERALIZED ANXIETY DISORDER): Primary | ICD-10-CM

## 2025-04-11 RX ORDER — ESCITALOPRAM OXALATE 10 MG/1
10 TABLET ORAL DAILY
Qty: 90 TABLET | Refills: 3 | Status: SHIPPED | OUTPATIENT
Start: 2025-04-11 | End: 2025-04-11 | Stop reason: SDUPTHER

## 2025-04-11 RX ORDER — ESCITALOPRAM OXALATE 10 MG/1
10 TABLET ORAL DAILY
Qty: 90 TABLET | Refills: 3 | Status: SHIPPED | OUTPATIENT
Start: 2025-04-11 | End: 2026-04-11

## 2025-04-15 ENCOUNTER — HOSPITAL ENCOUNTER (OUTPATIENT)
Facility: HOSPITAL | Age: 65
Discharge: HOME | End: 2025-04-15
Attending: STUDENT IN AN ORGANIZED HEALTH CARE EDUCATION/TRAINING PROGRAM | Admitting: STUDENT IN AN ORGANIZED HEALTH CARE EDUCATION/TRAINING PROGRAM
Payer: MEDICARE

## 2025-04-15 ENCOUNTER — TELEPHONE (OUTPATIENT)
Dept: FAMILY MEDICINE | Facility: CLINIC | Age: 65
End: 2025-04-15
Payer: MEDICARE

## 2025-04-15 VITALS
WEIGHT: 197 LBS | DIASTOLIC BLOOD PRESSURE: 69 MMHG | TEMPERATURE: 97 F | RESPIRATION RATE: 17 BRPM | HEIGHT: 67 IN | OXYGEN SATURATION: 97 % | SYSTOLIC BLOOD PRESSURE: 141 MMHG | HEART RATE: 55 BPM | BODY MASS INDEX: 30.92 KG/M2

## 2025-04-15 DIAGNOSIS — M54.16 LUMBAR RADICULOPATHY: Primary | ICD-10-CM

## 2025-04-15 DIAGNOSIS — G89.29 CHRONIC PAIN: ICD-10-CM

## 2025-04-15 PROCEDURE — 64483 NJX AA&/STRD TFRM EPI L/S 1: CPT | Mod: RT,,, | Performed by: STUDENT IN AN ORGANIZED HEALTH CARE EDUCATION/TRAINING PROGRAM

## 2025-04-15 PROCEDURE — 64484 NJX AA&/STRD TFRM EPI L/S EA: CPT | Mod: RT,,, | Performed by: STUDENT IN AN ORGANIZED HEALTH CARE EDUCATION/TRAINING PROGRAM

## 2025-04-15 PROCEDURE — 63600175 PHARM REV CODE 636 W HCPCS: Performed by: STUDENT IN AN ORGANIZED HEALTH CARE EDUCATION/TRAINING PROGRAM

## 2025-04-15 PROCEDURE — 64483 NJX AA&/STRD TFRM EPI L/S 1: CPT | Mod: RT | Performed by: STUDENT IN AN ORGANIZED HEALTH CARE EDUCATION/TRAINING PROGRAM

## 2025-04-15 PROCEDURE — 99152 MOD SED SAME PHYS/QHP 5/>YRS: CPT | Performed by: STUDENT IN AN ORGANIZED HEALTH CARE EDUCATION/TRAINING PROGRAM

## 2025-04-15 PROCEDURE — 64484 NJX AA&/STRD TFRM EPI L/S EA: CPT | Mod: RT | Performed by: STUDENT IN AN ORGANIZED HEALTH CARE EDUCATION/TRAINING PROGRAM

## 2025-04-15 PROCEDURE — 25500020 PHARM REV CODE 255: Performed by: STUDENT IN AN ORGANIZED HEALTH CARE EDUCATION/TRAINING PROGRAM

## 2025-04-15 RX ORDER — LIDOCAINE HYDROCHLORIDE 10 MG/ML
1 INJECTION, SOLUTION EPIDURAL; INFILTRATION; INTRACAUDAL; PERINEURAL ONCE
Status: COMPLETED | OUTPATIENT
Start: 2025-04-15 | End: 2025-04-15

## 2025-04-15 RX ORDER — MIDAZOLAM HYDROCHLORIDE 1 MG/ML
INJECTION INTRAMUSCULAR; INTRAVENOUS
Status: DISCONTINUED | OUTPATIENT
Start: 2025-04-15 | End: 2025-04-15 | Stop reason: HOSPADM

## 2025-04-15 RX ORDER — FENTANYL CITRATE 50 UG/ML
INJECTION, SOLUTION INTRAMUSCULAR; INTRAVENOUS
Status: DISCONTINUED | OUTPATIENT
Start: 2025-04-15 | End: 2025-04-15 | Stop reason: HOSPADM

## 2025-04-15 RX ORDER — SODIUM CHLORIDE 9 MG/ML
500 INJECTION, SOLUTION INTRAVENOUS CONTINUOUS
Status: DISCONTINUED | OUTPATIENT
Start: 2025-04-15 | End: 2025-04-15 | Stop reason: HOSPADM

## 2025-04-15 RX ORDER — DEXAMETHASONE SODIUM PHOSPHATE 10 MG/ML
INJECTION, SOLUTION INTRA-ARTICULAR; INTRALESIONAL; INTRAMUSCULAR; INTRAVENOUS; SOFT TISSUE
Status: DISCONTINUED | OUTPATIENT
Start: 2025-04-15 | End: 2025-04-15 | Stop reason: HOSPADM

## 2025-04-15 RX ORDER — LIDOCAINE HYDROCHLORIDE 20 MG/ML
INJECTION, SOLUTION EPIDURAL; INFILTRATION; INTRACAUDAL; PERINEURAL
Status: DISCONTINUED | OUTPATIENT
Start: 2025-04-15 | End: 2025-04-15 | Stop reason: HOSPADM

## 2025-04-15 NOTE — OP NOTE
"PROCEDURE: right Lumbar L4-5 and L5-S1 Transforaminal Epidural Steroid Injection    Patient Name: Maria M Mei  MRN: [id number]    PROCEDURE DATE: 4/15/2025    INJECTION # 1    DIAGNOSIS: Lumbar Radiculopathy  CPT CODE: 32379 (INJECTION(S), ANESTHETIC AGENT(S) AND/OR STEROID; TRANSFORAMINAL EPIDURAL, WITH IMAGING GUIDANCE (FLUOROSCOPY OR CT), LUMBAR OR SACRAL, SINGLE LEVEL), 88568 (Each additional level).     POSTPROCEDURE DIAGNOSIS: Same    PHYSICIAN: Kings Mooney DO  Assistant: Dr. Ramón harris  NEEDLE TYPE: - 22G 5" Spinal Needle  MEDICATIONS INJECTED: 6ml mixture of 1ml Dexamethasone 10mg/ml and 5ml 1% lidocaine PF split equally between each site.  CONTRAST: Omni 300    Sedation Medications - Mild Sedation with 2md Versed and 100mcg Fentanyl    Estimated Blood Loss - <2ml  Drains: None  Specimens Removed: None  Urine Output - Not Measured  Complications: lots of pressure at the L4-5 level. Had to redirect several times at that level  Outcome: Good    Informed Consent:  The patient's condition and proposed procedures, risks, and alternatives were discussed with the patient or responsible party.  The patient's / responsible party's questions were answered.   The patient / responsible party appeared to understand and chose to proceed.  Informed consent was obtained.  After obtaining written consent, an IV hep lock was placed. (See nurses notes for details).     Procedure in Detail:  The patient was taken back to the OR suite and placed in a prone position. The skin overlying the injection site was prepped and draped in an aseptic fashion. The target injection site (see above) was identified with fluoroscopy.     Procedural Pause:  A procedural pause verifying correct patient, medical record number, allergies, medications to be administered, current vital signs, and surgical site was performed immediately prior to beginning the procedure.    The skin and subcutaneous tissue overlying the target site(s) " of injection for the L4-5 and L5-S1 transforaminal epidural steroid injection was/were anesthetized using 4 mL of 1% lidocaine with a 25-gauge, 1½-inch needle.      The fluoroscopic beam was aligned to create a tunnel view.  The above noted needle was advanced parallel to the fluoroscopic beam towards the above noted foramen under fluoroscopic guidance.  The final position of the needle(s) was identified using AP and lateral views.  Paresthesias were not noted with final needle positioning.       A microbore extension tubing was attached to the needle to minimize any movement of the needle during injection or syringe change.  After negative aspiration for heme or CSF at each site(s) where the needle(s) was placed, 1ml of contrast dye was injected to confirm appropriate placement and that there was no vascular uptake.  Pain provocation by the injected contrast material was noted.  Then the injectate solution described above was injected in increments.  The needle was then retracted approximately alf and the needle track was flushed with 0.5 mL of Lidocaine 1%.  The needle(s) was then removed.     The same procedural technique outlined above was not repeated on the OPPOSITE side.    The heart rate, pulse oximetry, and blood pressure were continuously monitored throughout the procedure.  The procedure was well tolerated. She was carefully escorted to the recovery room in stable condition. Patient was monitored by RN for recovery period.  The patient will be contacted in the next few days to determine extent of relief.  Patient was given post procedure and discharge instructions to follow at home.  The patient was discharged in a stable condition.    Note Electronically Signed By:  Kings Persaud  04/15/2025

## 2025-04-15 NOTE — DISCHARGE INSTRUCTIONS
[facility]  [employer] service # [phone]  On-call pager for emergency# [phone]     POST-PROCEDURE INSTRUCTIONS:    Today you had an injection that included a steroid medications.  The steroid may or may not have been mixed with a local anesthetic when it was injected.   If the injection was in the neck, you may feel some pressure, numbness, or slight weakness in the arm after the procedure for a short period of time (this is a normal response), if this persists for longer than 1 day please contact our office or go to the emergency room.  If the injection was in the low back, you may feel some pressure, numbness, or slight weakness in the leg after the procedure for a short period of time (this is a normal response), if this persists for longer than 1 day please contact our office or go to the emergency room.  You may get side effects from the steroid.  This is not uncommon.  Symptoms include: elevated blood sugar, elevated blood pressure, headache, flushing, nausea, insomnia.  These symptoms are transient and will resolve within 1-3 days.  If symptoms last longer than this please contact our office or head to the emergency room.  Steroid medications can take anywhere from 3-14 days to take effect (rarely longer).  You may notice that your pain worsens for a short period of time after the injection, this would not be unusual due to the pressure and trauma from the needle.    If you do not have a follow up appointment scheduled, please contact my office (or the office of the physician who referred you for the procedure) to get a post-procedure follow up scheduled 2-4 weeks after the procedure.  This can be done as a virtual visit if that is more convenient for you.      What you need to do:    Keep a record of your response to the injection you had today.    How much relief did you get?   When did the relief start and how long did it last?  Were you  able to decrease the use of any of your pain medications?  Were you able to increase your level of activity?  How long did the relief last?    What to watch out for:    If you experience any of the following symptoms after your procedure, please notify the messaging service immediately (see above for contact information):   fever (increased oral temperature)   bleeding or swelling at the injection site,    drainage, rash or redness at the injection site    possible signs of infection    increased pain at the injection site   worsening of your usual pain   severe headache   new or worsening numbness    new arm and/or leg weakness, or    changes in bowel and/or bladder function: urinating or defecating on yourself and not knowing that you did it.    PLEASE FOLLOW ALL INSTRUCTIONS CAREFULLY     Do not engage in strenuous activity (e.g., lifting or pushing heavy objects or repeated bending) for 24 hours.     Do not take a bath, swim or use Jacuzzi for 24 hours after procedure. (A shower is fine).   Remove any Band-Aids when you get home.    Use cold/ice, as needed for comfort.  We recommend the use of cold therapy alternating on for 20 minutes, off for 20 minutes.    Do not apply direct heat (heating pad or heat packs) to the injection site for 24 hours.     Resume your usual medications, unless instructed otherwise by your Pain Physician.     If you are on warfarin (Coumadin) or other blood thinner, resume this medication as instructed by your prescribing Physician.    IF AT ANY POINT YOU ARE VERY CONCERNED ABOUT YOUR SYMPTOMS, PLEASE GO TO THE EMERGENCY ROOM.    If you develop worsening pain, weakness, numbness, lose bowel or bladder control (i.e., having an accident where you did not even know you had to go to the bathroom and suddenly noticed you soiled yourself), saddle anesthesia (a loss of sensation restricted to the area of the buttocks, anus and between the legs -- i.e., those parts of your body that would  touch a saddle if you were sitting on one) you need to go immediately to the emergency department for evaluation and treatment.    ----------------------------------------------------------------------------------------------------------------------------------------------------------------  If you received Sedation please read the following instructions:  POST SEDATION INSTRUCTIONS    Today you received intravenous medication (also known as sedation) that was used to help you relax and/or decrease discomfort during your procedure. This medication will be acting in your body for the next 24 hours, so you might feel a little tired or sleepy. This feeling will slowly wear off.   Common side effects associated with these medications include: drowsiness, dizziness, sleepiness, confusion, feeling excited, difficulty remembering things, lack of steadiness with walking or balance, loss of fine muscle control, slowed reflexes, difficulty focusing, and blurred vision.  Some over-the-counter and prescription medications (e.g., muscle relaxants, opioids, mood-altering medications, sedatives/hypnotics, antihistamines) can interact with the intravenous medication you received and cause an increased risk of the side effects listed above in addition to other potentially life threatening side effects. Use extreme caution if you are taking such medications, and consult with your Pain Physician or prescribing physician if you have any questions.  For the next 12-24 hours:    DO NOT--Drive a car, operate machinery or power tools   DO NOT--Drink any alcoholic beverages (not even beer), they may dangerously increase the risk of side effects.    DO NOT--Make any important legal or business decisions or sign important documents.  We advise you to have someone to assist you at home. Move slowly and carefully. Do not make sudden changes in position. Be aware of dizziness or light-headedness and move accordingly.   If you seek medical  treatment within 24 hours, let the nurse or doctor caring for you know that you have received the above medications. If you have any questions or concerns related to your sedation or treatment today please contact us.

## 2025-04-15 NOTE — TELEPHONE ENCOUNTER
pt and tell her I called in lexapro 10 mg dose, she can initially cut in half to make it 5 mg for first 1 to 2 weeks and then start full tablet, that way she lloyd snot need to worry about running out of medication sooner, also sent 90 days supply for her     I notified patient of everything above. Patient voices understanding.

## 2025-04-15 NOTE — DISCHARGE SUMMARY
[facility]  Discharge Note  Short Stay    Procedure(s) (LRB):  Right L4-5, L5-S1 TFESI (may adjust level based on MRI) (Right)      OUTCOME: Patient tolerated treatment/procedure well without complication and is now ready for discharge.    DISPOSITION: Home or Self Care    FINAL DIAGNOSIS:  <principal problem not specified>    FOLLOWUP: In clinic    DISCHARGE INSTRUCTIONS:  No discharge procedures on file.     TIME SPENT ON DISCHARGE: 10 minutes

## 2025-04-15 NOTE — TELEPHONE ENCOUNTER
----- Message from Luclia Carballo MD sent at 4/11/2025  2:08 PM CDT -----  Regarding: FW: Patient requesting call  Please call the pt and tell her I called in lexapro 10 mg dose, she can initially cut in half to make it 5 mg for first 1 to 2 weeks and then start full tablet, that way she lloyd snot need to worry about running out of medication sooner, also sent 90 days supply for her  ----- Message -----  From: Frances Diaz RN  Sent: 4/11/2025   2:02 PM CDT  To: Lucila Carballo MD  Subject: RE: Patient requesting call                      She said that's not what was called in. She said she would run out before the end of the month. Can someone from your office reach out to her. She states she is very anxious.  ----- Message -----  From: Lucila Carballo MD  Sent: 4/11/2025   1:59 PM CDT  To: Frances Diaz RN  Subject: RE: Patient requesting call                      That's right, she needs to take 1 tab of 5 mg daily and then increase to 2, there is no mistake in it  ----- Message -----  From: Frances Diaz RN  Sent: 4/11/2025  11:48 AM CDT  To: Lucila Carballo MD  Subject: Patient requesting call                          While doing preop with patient, she states that her lexapro was called in incorrectly. She is supposed to start with one a day then increase to two. States she has been trying to get in touch with the office. I let her know that I would pass on the message

## 2025-04-15 NOTE — PLAN OF CARE
Maria M Mei has met all discharge criteria from Phase II. Vital Signs are stable, ambulating  without difficulty. Discharge instructions given, patient verbalized understanding. Discharged from facility via wheelchair in stable condition.

## 2025-04-16 DIAGNOSIS — F41.1 GAD (GENERALIZED ANXIETY DISORDER): ICD-10-CM

## 2025-04-16 DIAGNOSIS — F51.01 PRIMARY INSOMNIA: ICD-10-CM

## 2025-04-17 RX ORDER — QUETIAPINE FUMARATE 25 MG/1
25 TABLET, FILM COATED ORAL DAILY
Qty: 90 TABLET | Refills: 2 | Status: SHIPPED | OUTPATIENT
Start: 2025-04-17

## 2025-04-17 RX ORDER — ESCITALOPRAM OXALATE 10 MG/1
10 TABLET ORAL DAILY
Qty: 90 TABLET | Refills: 3 | Status: SHIPPED | OUTPATIENT
Start: 2025-04-17

## 2025-04-22 ENCOUNTER — TELEPHONE (OUTPATIENT)
Dept: FAMILY MEDICINE | Facility: CLINIC | Age: 65
End: 2025-04-22
Payer: MEDICARE

## 2025-04-22 NOTE — TELEPHONE ENCOUNTER
----- Message from Alycia sent at 4/16/2025 11:44 AM CDT -----  Pt Requesting Call BackWh called:  Nick holt [facility] called for pt:Best call back #: (call) [phone](fax)Add notes: caller said he is calling to follow up on the orders request they sent in regarding pt's DME supplies (brace for pt's back, right hip, and right knee)

## 2025-04-23 ENCOUNTER — TELEPHONE (OUTPATIENT)
Dept: FAMILY MEDICINE | Facility: CLINIC | Age: 65
End: 2025-04-23
Payer: MEDICARE

## 2025-04-23 DIAGNOSIS — G89.29 OTHER CHRONIC PAIN: ICD-10-CM

## 2025-04-23 DIAGNOSIS — M54.16 LUMBAR RADICULOPATHY: Primary | ICD-10-CM

## 2025-04-23 NOTE — TELEPHONE ENCOUNTER
----- Message from Prabhu sent at 4/22/2025 10:01 AM CDT -----  .Type:  Needs Medical AdviceWho Called: César with [employer]Would the patient rather a call back or a response [facility]? Call [facility] Call Back Number: [phone] Information: César stated a fax was sent over for pt and would like a call back for the status

## 2025-04-24 ENCOUNTER — TELEPHONE (OUTPATIENT)
Dept: FAMILY MEDICINE | Facility: CLINIC | Age: 65
End: 2025-04-24
Payer: MEDICARE

## 2025-04-24 NOTE — TELEPHONE ENCOUNTER
----- Message from Alycia sent at 4/24/2025 11:21 AM CDT -----  Pt Requesting Call BackWh called: César holt [facility] called for pt:Best call back #: (call) [phone](fax)Add notes: caller said he is calling to follow up on DME order for back and right knee and right hip brace that pt requested

## 2025-04-25 ENCOUNTER — TELEPHONE (OUTPATIENT)
Dept: FAMILY MEDICINE | Facility: CLINIC | Age: 65
End: 2025-04-25
Payer: MEDICARE

## 2025-04-25 DIAGNOSIS — M70.61 TROCHANTERIC BURSITIS OF RIGHT HIP: ICD-10-CM

## 2025-04-25 DIAGNOSIS — M54.16 LUMBAR RADICULOPATHY: Primary | ICD-10-CM

## 2025-04-25 RX ORDER — METHOCARBAMOL 750 MG/1
750-1500 TABLET, FILM COATED ORAL 2 TIMES DAILY PRN
Qty: 360 TABLET | Refills: 1 | Status: SHIPPED | OUTPATIENT
Start: 2025-04-25 | End: 2025-10-22

## 2025-04-25 NOTE — TELEPHONE ENCOUNTER
----- Message from Cassie sent at 4/25/2025 11:35 AM CDT -----  Type:  Needs Medical AdviceWho Called: [facility] Call Back Number: [phone] Information: Patient is requesting a call back in regards to the DME was refaxed to Jo  and he is following up on whether that order was able to be completed for the brace

## 2025-04-25 NOTE — TELEPHONE ENCOUNTER
I spoke to Jose Ramon  and I notified him that we got the papers from them and that I was just waiting for the doctor to fill out paperwork. Jose Ramon voices understanding.

## 2025-04-29 ENCOUNTER — TELEPHONE (OUTPATIENT)
Dept: FAMILY MEDICINE | Facility: CLINIC | Age: 65
End: 2025-04-29
Payer: MEDICARE

## 2025-04-29 NOTE — TELEPHONE ENCOUNTER
----- Message from Lucila Carballo MD sent at 4/28/2025 10:19 AM CDT -----  Contact: [facility] [employer] (Jim) fax  ----- Message -----  From: Jo Guillen MA  Sent: 4/28/2025   9:50 AM CDT  To: Lucila Carballo MD    Papers are in your desk. You just need to fill them out.  ----- Message -----  From: Augusto Vasquez  Sent: 4/28/2025   9:32 AM CDT  To: Haris Gaytan Staff    Type: Requesting to speak with nurseWho Called:  Rocky Vila) Regarding: checking status of medical supplies for braces that was faxed to office.Would the patient rather a call back or a response via [facility]? Call [facility] Call Back Number:   Additional Information:

## 2025-04-29 NOTE — TELEPHONE ENCOUNTER
I spoke to Gee and he said he receive the papers for the patient sign for the orders for her brace.

## 2025-04-30 DIAGNOSIS — R73.03 PREDIABETES: ICD-10-CM

## 2025-04-30 DIAGNOSIS — N18.31 CHRONIC KIDNEY DISEASE, STAGE 3A: ICD-10-CM

## 2025-05-01 RX ORDER — DICLOFENAC SODIUM 75 MG/1
75 TABLET, DELAYED RELEASE ORAL 2 TIMES DAILY PRN
Qty: 60 TABLET | Refills: 11 | OUTPATIENT
Start: 2025-05-01

## 2025-05-22 ENCOUNTER — LAB VISIT (OUTPATIENT)
Dept: LAB | Facility: HOSPITAL | Age: 65
End: 2025-05-22
Attending: FAMILY MEDICINE
Payer: MEDICARE

## 2025-05-22 DIAGNOSIS — R73.03 PREDIABETES: ICD-10-CM

## 2025-05-22 DIAGNOSIS — N18.31 CHRONIC KIDNEY DISEASE, STAGE 3A: ICD-10-CM

## 2025-05-22 LAB
ALBUMIN SERPL BCP-MCNC: 4 G/DL (ref 3.5–5.2)
ALP SERPL-CCNC: 129 UNIT/L (ref 40–150)
ALT SERPL W/O P-5'-P-CCNC: 16 UNIT/L (ref 10–44)
ANION GAP (OHS): 10 MMOL/L (ref 8–16)
AST SERPL-CCNC: 17 UNIT/L (ref 11–45)
BILIRUB SERPL-MCNC: 0.3 MG/DL (ref 0.1–1)
BUN SERPL-MCNC: 32 MG/DL (ref 8–23)
CALCIUM SERPL-MCNC: 9.5 MG/DL (ref 8.7–10.5)
CHLORIDE SERPL-SCNC: 109 MMOL/L (ref 95–110)
CO2 SERPL-SCNC: 22 MMOL/L (ref 23–29)
CREAT SERPL-MCNC: 1 MG/DL (ref 0.5–1.4)
EAG (OHS): 123 MG/DL (ref 68–131)
GFR SERPLBLD CREATININE-BSD FMLA CKD-EPI: >60 ML/MIN/1.73/M2
GLUCOSE SERPL-MCNC: 86 MG/DL (ref 70–110)
HBA1C MFR BLD: 5.9 % (ref 4–5.6)
POTASSIUM SERPL-SCNC: 4.2 MMOL/L (ref 3.5–5.1)
PROT SERPL-MCNC: 7.5 GM/DL (ref 6–8.4)
SODIUM SERPL-SCNC: 141 MMOL/L (ref 136–145)

## 2025-05-22 PROCEDURE — 83036 HEMOGLOBIN GLYCOSYLATED A1C: CPT

## 2025-05-22 PROCEDURE — 82040 ASSAY OF SERUM ALBUMIN: CPT

## 2025-05-22 PROCEDURE — 36415 COLL VENOUS BLD VENIPUNCTURE: CPT

## 2025-05-23 ENCOUNTER — RESULTS FOLLOW-UP (OUTPATIENT)
Dept: FAMILY MEDICINE | Facility: CLINIC | Age: 65
End: 2025-05-23

## 2025-05-27 ENCOUNTER — TELEPHONE (OUTPATIENT)
Dept: PAIN MEDICINE | Facility: CLINIC | Age: 65
End: 2025-05-27

## 2025-05-27 ENCOUNTER — OFFICE VISIT (OUTPATIENT)
Dept: PAIN MEDICINE | Facility: CLINIC | Age: 65
End: 2025-05-27
Payer: MEDICARE

## 2025-05-27 VITALS
HEIGHT: 67 IN | HEART RATE: 66 BPM | BODY MASS INDEX: 30 KG/M2 | DIASTOLIC BLOOD PRESSURE: 79 MMHG | WEIGHT: 191.13 LBS | SYSTOLIC BLOOD PRESSURE: 122 MMHG

## 2025-05-27 DIAGNOSIS — M54.16 LUMBAR RADICULOPATHY: ICD-10-CM

## 2025-05-27 DIAGNOSIS — M79.10 MYALGIA: Primary | ICD-10-CM

## 2025-05-27 DIAGNOSIS — M70.61 TROCHANTERIC BURSITIS OF RIGHT HIP: Primary | ICD-10-CM

## 2025-05-27 DIAGNOSIS — G57.01 PIRIFORMIS SYNDROME OF RIGHT SIDE: ICD-10-CM

## 2025-05-27 DIAGNOSIS — M79.10 MYALGIA: ICD-10-CM

## 2025-05-27 DIAGNOSIS — M70.61 TROCHANTERIC BURSITIS OF RIGHT HIP: ICD-10-CM

## 2025-05-27 DIAGNOSIS — M79.18 MYALGIA, OTHER SITE: ICD-10-CM

## 2025-05-27 PROCEDURE — 99214 OFFICE O/P EST MOD 30 MIN: CPT | Mod: S$GLB,,, | Performed by: STUDENT IN AN ORGANIZED HEALTH CARE EDUCATION/TRAINING PROGRAM

## 2025-05-27 PROCEDURE — 3008F BODY MASS INDEX DOCD: CPT | Mod: CPTII,S$GLB,, | Performed by: STUDENT IN AN ORGANIZED HEALTH CARE EDUCATION/TRAINING PROGRAM

## 2025-05-27 PROCEDURE — 3078F DIAST BP <80 MM HG: CPT | Mod: CPTII,S$GLB,, | Performed by: STUDENT IN AN ORGANIZED HEALTH CARE EDUCATION/TRAINING PROGRAM

## 2025-05-27 PROCEDURE — 3044F HG A1C LEVEL LT 7.0%: CPT | Mod: CPTII,S$GLB,, | Performed by: STUDENT IN AN ORGANIZED HEALTH CARE EDUCATION/TRAINING PROGRAM

## 2025-05-27 PROCEDURE — 99999 PR PBB SHADOW E&M-EST. PATIENT-LVL III: CPT | Mod: PBBFAC,,, | Performed by: STUDENT IN AN ORGANIZED HEALTH CARE EDUCATION/TRAINING PROGRAM

## 2025-05-27 PROCEDURE — 3074F SYST BP LT 130 MM HG: CPT | Mod: CPTII,S$GLB,, | Performed by: STUDENT IN AN ORGANIZED HEALTH CARE EDUCATION/TRAINING PROGRAM

## 2025-05-27 PROCEDURE — 1159F MED LIST DOCD IN RCRD: CPT | Mod: CPTII,S$GLB,, | Performed by: STUDENT IN AN ORGANIZED HEALTH CARE EDUCATION/TRAINING PROGRAM

## 2025-05-27 RX ORDER — IBUPROFEN 800 MG/1
800 TABLET, FILM COATED ORAL 2 TIMES DAILY PRN
Qty: 60 TABLET | Refills: 5 | Status: SHIPPED | OUTPATIENT
Start: 2025-05-27 | End: 2025-11-23

## 2025-05-27 NOTE — TELEPHONE ENCOUNTER
----- Message from Jessica Dumont DO sent at 2025  2:28 PM CDT -----  Regarding: Order for MOODY TRINIDAD    Patient Name: MOODY TRINIDAD([id number])  Sex: Female  : 1960      PCP: ABILIO BUTLER    Center[facility]     Types of orders made on 2025: Medications, Procedure Request    Order Date:2025  Ordering User:JESSICA DUMONT [[id number]  Encounter Provider:Jessica Dumont DO [[id number]]  Authorizing Provider: Jessica Dumont DO [[id number]]  Department:[facility][[id number]]    Common Order Information  Procedure -> Other (Specify location and laterality)     Pre-op Diagnosis -> Myalgia       -> Greater trochanteric bursitis of right hip     Order Specific Information  Order: Procedure Request Order for Pain Management [Custom: [id number]]  Order #:          [id number]: 1 FUTURE    Priority: Routine  Class: Clinic Performed    Future Order Information      Expires on:2026            Expected by:2025                   Comment:25 - Right piriformis + Right GTB with ultrasound - no sed - no AC    Associated Diagnoses      M70.61 Trochanteric bursitis of right hip      M79.10 Myalgia      G57.01 Piriformis syndrome of right side      Physician -> hanyu         Is patient on anti-coagulants? -> No         Facility Name: -> [facility]           Priority: Routine  Class: Clinic Performed    Future Order Information      Expires on:2026            Expected by:2025                   Comment:25 - Right piriformis + Right GTB with ultrasound - no sed - no AC    Associated Diagnoses      M70.61 Trochanteric bursitis of right hip      M79.10 Myalgia      G57.01 Piriformis syndrome of right side      Procedure -> Other (Specify location and laterality)         Physician -> hanyu         Is patient on anti-coagulants? -> No         Pre-op Diagnosis -> Myalgia           -> Greater trochanteric bursitis of right hip         Facility Name:  -> [place]

## 2025-05-27 NOTE — PROGRESS NOTES
- Chronic Pain - f/u    Referring Physician: No ref. provider found    Date: 05/27/2025     Re: Maria M Mei  MR#: [id number]  YOB: 1960  Age: 64 y.o.    Chief Complaint:   Chief Complaint   Patient presents with    Low-back Pain    Tailbone Pain     **This note is dictated using the M*Modal Fluency Direct word recognition program. There are word recognition mistakes that are occasionally missed on review.**    ASSESSMENT: 64 y.o. year old female with right buttock/thigh and tailbone pain, consistent with     1. Trochanteric bursitis of right hip  ibuprofen (ADVIL,MOTRIN) 800 MG tablet    Procedure Request Order for Pain Management      2. Lumbar radiculopathy  ibuprofen (ADVIL,MOTRIN) 800 MG tablet      3. Myalgia  Procedure Request Order for Pain Management      4. Piriformis syndrome of right side  Procedure Request Order for Pain Management        PLAN:     Right lumbar radiculopathy  4/15/25 - Right L4-5, L5-S1 TFESI (may adjust level based on MRI) - RN sed - no Ac - 50%@6w  -new MRI lumbar reviewed. She has severe right L3-4 foraminal stenosis.  -if TFESI not helpful then consider Piriformis vs GTB injection  -Continue methocarbamol 750-1500mg PRN.  -Rx ibuprofen 800mg BID PRN.  Recommended taking the least amount possible  -finish medrol dose jamila    Right piriformis syndrome and Right trochanteric bursitis  7/18/24 @845 - R GTB - 100% x6m  -stopped diclofenac  6/9/25 - Right piriformis + Right GTB with ultrasound - no sed - no AC    Traumatic coccydynia - resolved  -continue aspercream and lidocaine patches  -completed PT  -tylenol and ibuprofen was helpful    Right hip pain  -patient has mild OA and mild (+) provocative maneuvers  -suspect that this may also be causing some underlying right bursitis issues.    - RTC 3 months  - Counseled patient regarding the importance of weight loss and activity modification and physical therapy.    The above plan and management options were discussed at  length with patient. Patient is in agreement with the above and verbalized understanding. It will be communicated with the referring physician via electronic record, fax, or mail.  Lab/study reports reviewed were important and necessary because subsequent medical and treatment recommendations required review of the above lab/study reports. Images viewed/reviewed above were important and necessary because subsequent medical and treatment recommendations required review of the reviewed image(s).     Electronically signed by:  Kings Mooney DO  05/27/2025    =========================================================================================================    SUBJECTIVE:    Interval History 5/27/2025:   Maria M Mei is a 64 y.o. female presents to the clinic for follow up.  Since last visit the pain has has improved.  The injection on 4/15/25 helped the back pain that she gets with bending over.  It helped the radicular pain in the right leg.  She takes robaxin which helps robaxin.  Back pain is 1/10.      The pain is located in the gluteal area and tailbone area and radiates to the right hip, groin, and leg.  The pain is described as aching and tingling    At BEST  2/10   At WORST  10/10 on the WORST day.    On average pain is rated as 4/10.   Today the pain is rated as 2/10  Symptoms interfere with daily activity.   Exacerbating factors: Sitting and Touching.    Mitigating factors medications, Ice.     Current pain medications: Medrol dose jamila, 800mg ibuprofen BID, seroquel 25mg qhs, Tylenol 650mg BID,   Failed Pain Medications: diclofenac 50mg, gabapentin (not helpful for buttock pain)    Pain Procedures:  7/18/24 @845 - R GTB - 100%X6m    Interval History 3/31/2025:   Maria M Mei is a 64 y.o. female presents to the clinic for follow up.  Since last visit the pain has has significantly worsened. The patient went to the ER on 3/27.  She get stabbing in her back.  The pain radiates down  "the side of her leg to the knee.  This is radiating down the right side.  This started about 1 month ago, but it got really worse around 3/17.  Patient went to PCP and got steroid shot. She went top the ED and got prednisone and another steroid shot.        Interval History 8/29/2024:   Maria M Mei is a 64 y.o. female presents to the clinic for follow up.  Since last visit the pain has has improved.    The pain is located in the gluteal area and tail bone area and radiates to the right hip and groin area.  The pain is described as sharp and shooting    At BEST  2/10   At WORST  4/10 on the WORST day.    On average pain is rated as 2/10.   Today the pain is rated as 3/10  Symptoms interfere with daily activity and sleeping.   Exacerbating factors: Sitting and Walking.    Mitigating factors medications.     Current pain medications: tylenol, diclofenac 75mg BID  Failed Pain Medications: gabapentin (not helpful for buttock pain)    Pain Procedures:  7/18/24 @845 - R GTB - 100%X2W    Initial hx:  Maria M Mei is a 64 y.o. female presents to the clinic for the evaluation of right buttock pain. The pain started 2 years ago following no inciting event and symptoms have been worsening.  The patient states that she has been having a lot of right buttock pain for a couple years since she had an achilles injury and was in a boot for an extended period.      Then on 6/10/24 she slipped and landed on her butt. Since then her tailbone has been really painful.  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  She had an XR at the ER and it did not show any fracture.  The pain has improved somewhat since 6/10.  The pain is just "there" all the time and worse with prolonged sitting.  She has not started PT yet, but it is scheduled.     Pain Description:    The pain is located in the buttock and tail bone area and radiates to the right groin area.    At BEST  6/10   At WORST  10/10 on the WORST day.    On " average pain is rated as 7/10.   Today the pain is rated as 8/10  The pain is intermittent.  The pain is described as sharp and shooting    Symptoms interfere with daily activity and sleeping.   Exacerbating factors: Sitting and Walking.    Mitigating factors medications.   She reports 6 hours of sleep per night.    Physical Therapy/Home Exercise: Yes, currently has a home exercise program    Current Pain Medications:    - Percocet 5mg (doesn't take), tylenol, ibuprofen    Failed Pain Medications:    - gabapentin (not helpful for buttock pain)    Pain Treatment Therapies:    Pain procedures: none  Physical Therapy: none  Chiropractor: none  Acupuncture: none  TENS unit: none  Spinal decompression: none  Joint replacement: none    Patient denies urinary incontinence, bowel incontinence, significant motor weakness, and loss of sensations.  Patient denies any suicidal or homicidal ideations     report:  Reviewed and consistent with medication use as prescribed.    Imaging:   XR Sacrum 06/11/24:    Sclerotic changes of the SI joints bilaterally, left-sided greater than right.  No erosions.  No evidence of acute fracture or dislocation.  A few pelvic phleboliths.         5/27/2025     1:52 PM 8/29/2024     8:11 AM 6/26/2024     9:03 AM   Pain Disability Index (PDI)   Family/Home Responsibilities: 4 2 8   Recreation: 4 2 8   Occupation: 4 2 8   Sexual Behavior: 4 2 8   Self Care: 4 2 8   Life-Support Activities: 4 2 8   Pain Disability Index (PDI) 28 14 56        Past Medical History:   Diagnosis Date    COVID-19 12/2020    Hyperlipidemia     MI (myocardial infarction)     Stroke      Past Surgical History:   Procedure Laterality Date    ANKLE SURGERY      CHOLECYSTECTOMY      INJECTION, SPINE, LUMBOSACRAL, TRANSFORAMINAL APPROACH Right 4/15/2025    Procedure: Right L4-5, L5-S1 TFESI (may adjust level based on MRI);  Surgeon: Kings Mooney DO;  Location: [facility];  Service: Pain Management;   Laterality: Right;  ASA 81mg daily-ok     Social History     Socioeconomic History    Marital status: Single   Tobacco Use    Smoking status: Every Day     Current packs/day: 0.50     Average packs/day: 0.5 packs/day for 38.4 years (19.2 ttl pk-yrs)     Types: Cigarettes     Start date: 1987    Smokeless tobacco: Never    Tobacco comments:     quit for 2 years in the middle; weaned self down, now 3 / day   Substance and Sexual Activity    Alcohol use: Yes     Alcohol/week: 1.0 standard drink of alcohol     Types: 1 Standard drinks or equivalent per week     Comment: Socially    Drug use: No    Sexual activity: Not Currently     Social Drivers of Health     Financial Resource Strain: Low Risk  (4/24/2023)    Overall Financial Resource Strain (CARDIA)     Difficulty of Paying Living Expenses: Not hard at all   Food Insecurity: No Food Insecurity (4/24/2023)    Hunger Vital Sign     Worried About Running Out of Food in the Last Year: Never true     Ran Out of Food in the Last Year: Never true   Transportation Needs: No Transportation Needs (4/24/2023)    PRAPARE - Transportation     Lack of Transportation (Medical): No     Lack of Transportation (Non-Medical): No   Physical Activity: Insufficiently Active (4/24/2023)    Exercise Vital Sign     Days of Exercise per Week: 2 days     Minutes of Exercise per Session: 60 min   Stress: No Stress Concern Present (4/24/2023)    [employer] - Occupational Stress Questionnaire     Feeling of Stress : Only a little   Housing Stability: Low Risk  (4/24/2023)    Housing Stability Vital Sign     Unable to Pay for Housing in the Last Year: No     Number of Places Lived in the Last Year: 1     Unstable Housing in the Last Year: No     Family History   Problem Relation Name Age of Onset    Obesity Mother      Cancer Mother      Diabetes Mother      Hypertension Mother      Lung cancer Mother      Hypertension Father      Stroke Father      Heart disease  Father      Obesity Sister 2     Hyperlipidemia Sister 2     Hypertension Sister 2     Arthritis Sister 2     Obesity Brother 2     Drug abuse Brother 2     Diabetes Brother 2     Heart disease Brother 2     Diabetes Daughter x1     Bipolar disorder Daughter x1     Obesity Daughter x1     No Known Problems Maternal Grandmother      No Known Problems Maternal Grandfather      No Known Problems Paternal Grandmother      No Known Problems Paternal Grandfather         Review of patient's allergies indicates:   Allergen Reactions    Codeine Itching       Current Outpatient Medications   Medication Sig    albuterol (VENTOLIN HFA) 90 mcg/actuation inhaler Inhale 2 puffs into the lungs every 6 (six) hours as needed for Wheezing or Shortness of Breath.  Rescue    ascorbic acid, vitamin C, (VITAMIN C) 250 MG tablet Take 250 mg by mouth once daily.    atorvastatin (LIPITOR) 40 MG tablet TAKE 1 TABLET ONE TIME DAILY    EScitalopram oxalate (LEXAPRO) 10 MG tablet Take 1 tablet (10 mg total) by mouth once daily.    fluticasone propionate (FLONASE) 50 mcg/actuation nasal spray 2 sprays (100 mcg total) by Each Nostril route once daily.    methocarbamoL (ROBAXIN) 750 MG Tab Take 1-2 tablets (750-1,500 mg total) by mouth 2 (two) times daily as needed (muscle pain).    nicotine (NICODERM CQ) 21 mg/24 hr Place 1 patch onto the skin once daily.    nicotine, polacrilex, (NICORETTE) 2 mg Gum Take 1 each (2 mg total) by mouth as needed (as needed).    pantoprazole (PROTONIX) 40 MG tablet TAKE 1 TABLET EVERY DAY    QUEtiapine (SEROQUEL) 25 MG Tab Take 1 tablet (25 mg total) by mouth once daily.    aspirin 81 MG Chew Chew and swallow 1 tablet (81 mg total) by mouth once daily.    ibuprofen (ADVIL,MOTRIN) 800 MG tablet Take 1 tablet (800 mg total) by mouth 2 (two) times daily as needed for Pain. Take with food     No current facility-administered medications for this visit.       REVIEW OF SYSTEMS:    GENERAL:  No weight loss, malaise or  "fevers.  HEENT:   No recent changes in vision or hearing  NECK:  Negative for lumps, no difficulty with swallowing.  RESPIRATORY:  Negative for cough, wheezing or shortness of breath, patient denies any recent URI.  CARDIOVASCULAR:  Negative for chest pain, leg swelling or palpitations.  GI:  Negative for abdominal discomfort, blood in stools or black stools or change in bowel habits.  MUSCULOSKELETAL:  See HPI.  SKIN:  Negative for lesions, rash, and itching.  PSYCH:  No mood disorder or recent psychosocial stressors.  Patients sleep is not disturbed secondary to pain.  HEMATOLOGY/LYMPHOLOGY:  Negative for prolonged bleeding, bruising easily or swollen nodes.  Patient is not currently taking any anti-coagulants  NEURO:   No history of headaches, syncope, paralysis, seizures or tremors.  All other reviewed and negative other than HPI.    OBJECTIVE:    /79 (BP Location: Left arm, Patient Position: Sitting)   Pulse 66   Ht 5' 7" (1.702 m)   Wt 86.7 kg (191 lb 2.2 oz)   BMI 29.94 kg/m²     PHYSICAL EXAMINATION:    GENERAL: Well appearing, in no acute distress, alert and oriented x3.  PSYCH:  Mood and affect appropriate.  SKIN: Skin color, texture, turgor normal, no rashes or lesions.  HEAD/FACE:  Normocephalic, atraumatic. Cranial nerves grossly intact.  CV: RRR with palpation of the radial artery.    PULM: CTAB. No evidence of respiratory difficulty, symmetric chest rise.  GI:  Soft and non-tender.    BACK:   - No obvious deformity or signs of trauma, Normal lumbar lordotic curve  - Negative spinous process tenderness  - Negative paravertebral tenderness  - Negative pain to palpation over the facet joints of the lumbar spine.   - Positive QL / Iliac crest / Glut tenderness on the right  - Slump test is Negative for radicular pain  - Slump test is Negative for back pain  - Supine Straight leg raising is Negative for radicular pain  - Supine Straight leg raising is Negative for back pain  - Lumbar ROM is " diminished in Flexion with pain  - Lumbar ROM is normal in Extension without pain  - Did not perform Sustained Hip Flexion test (for discogenic pain)  - Positive Altered Gait, Posture  - Axial facet loading test Negative on the bilateral side(s)    SI Joint exam:  - Positive SI joint tenderness to palpation  - Bull's sign Negative  - Yeoman's Test: Did not perform for SI joint pain indicating anterior SI ligament involvement. Did not perform for anterior thigh pain/paresthesia which indicates femoral nerve stretch.  - Gaenslen's Test:Negative  - Finger Betty's Sign:Negative  - SI compression test:Did not perform  - SI distraction test:Negative  - Thigh Thrust: Positive  - SI Thrust: Did not perform    MUSKULOSKELETAL:    EXTREMITIES:   Hip Exam:  - Log Roll Negative   - FADIR Positive  - FAIR (+) for piriformis  - Stinchfield Positive  - Hip Scour Negative  - GTB Tenderness Positive    MUSCULOSKELETAL:  No atrophy or tone abnormalities are noted in the UE or LE.  No deformities, edema, or skin discoloration are noted on visible skin. Good capillary refill.    NEURO: Bilateral upper and lower extremity coordination and muscle stretch reflexes are physiologic and symmetric.      NEUROLOGICAL EXAM:  MENTAL STATUS: A x O x 3, good concentration, speech is fluent and goal directed  MEMORY: recent and remote are intact  CN: CN2-12 grossly intact  MOTOR: 5/5 in all muscle groups  DTRs: 2+ intact symmetric  Sensation:    -no Loss of sensation in a left lower and right lower leg distribution.  Babinski: absent on the bilateral side(s)  Hampton: absent on the bilateral side(s)  Clonus: absent on the bilateral side(s)    GAIT: antalgic.

## 2025-05-27 NOTE — H&P (VIEW-ONLY)
- Chronic Pain - f/u    Referring Physician: No ref. provider found    Date: 05/27/2025     Re: Maria M Mei  MR#: [id number]  YOB: 1960  Age: 64 y.o.    Chief Complaint:   Chief Complaint   Patient presents with    Low-back Pain    Tailbone Pain     **This note is dictated using the M*Modal Fluency Direct word recognition program. There are word recognition mistakes that are occasionally missed on review.**    ASSESSMENT: 64 y.o. year old female with right buttock/thigh and tailbone pain, consistent with     1. Trochanteric bursitis of right hip  ibuprofen (ADVIL,MOTRIN) 800 MG tablet    Procedure Request Order for Pain Management      2. Lumbar radiculopathy  ibuprofen (ADVIL,MOTRIN) 800 MG tablet      3. Myalgia  Procedure Request Order for Pain Management      4. Piriformis syndrome of right side  Procedure Request Order for Pain Management        PLAN:     Right lumbar radiculopathy  4/15/25 - Right L4-5, L5-S1 TFESI (may adjust level based on MRI) - RN sed - no Ac - 50%@6w  -new MRI lumbar reviewed. She has severe right L3-4 foraminal stenosis.  -if TFESI not helpful then consider Piriformis vs GTB injection  -Continue methocarbamol 750-1500mg PRN.  -Rx ibuprofen 800mg BID PRN.  Recommended taking the least amount possible  -finish medrol dose jamila    Right piriformis syndrome and Right trochanteric bursitis  7/18/24 @845 - R GTB - 100% x6m  -stopped diclofenac  6/9/25 - Right piriformis + Right GTB with ultrasound - no sed - no AC    Traumatic coccydynia - resolved  -continue aspercream and lidocaine patches  -completed PT  -tylenol and ibuprofen was helpful    Right hip pain  -patient has mild OA and mild (+) provocative maneuvers  -suspect that this may also be causing some underlying right bursitis issues.    - RTC 3 months  - Counseled patient regarding the importance of weight loss and activity modification and physical therapy.    The above plan and management options were discussed at  length with patient. Patient is in agreement with the above and verbalized understanding. It will be communicated with the referring physician via electronic record, fax, or mail.  Lab/study reports reviewed were important and necessary because subsequent medical and treatment recommendations required review of the above lab/study reports. Images viewed/reviewed above were important and necessary because subsequent medical and treatment recommendations required review of the reviewed image(s).     Electronically signed by:  Kings Mooney DO  05/27/2025    =========================================================================================================    SUBJECTIVE:    Interval History 5/27/2025:   Maria M Mei is a 64 y.o. female presents to the clinic for follow up.  Since last visit the pain has has improved.  The injection on 4/15/25 helped the back pain that she gets with bending over.  It helped the radicular pain in the right leg.  She takes robaxin which helps robaxin.  Back pain is 1/10.      The pain is located in the gluteal area and tailbone area and radiates to the right hip, groin, and leg.  The pain is described as aching and tingling    At BEST  2/10   At WORST  10/10 on the WORST day.    On average pain is rated as 4/10.   Today the pain is rated as 2/10  Symptoms interfere with daily activity.   Exacerbating factors: Sitting and Touching.    Mitigating factors medications, Ice.     Current pain medications: Medrol dose jamila, 800mg ibuprofen BID, seroquel 25mg qhs, Tylenol 650mg BID,   Failed Pain Medications: diclofenac 50mg, gabapentin (not helpful for buttock pain)    Pain Procedures:  7/18/24 @845 - R GTB - 100%X6m    Interval History 3/31/2025:   Maria M Mei is a 64 y.o. female presents to the clinic for follow up.  Since last visit the pain has has significantly worsened. The patient went to the ER on 3/27.  She get stabbing in her back.  The pain radiates down  "the side of her leg to the knee.  This is radiating down the right side.  This started about 1 month ago, but it got really worse around 3/17.  Patient went to PCP and got steroid shot. She went top the ED and got prednisone and another steroid shot.        Interval History 8/29/2024:   Maria M Mei is a 64 y.o. female presents to the clinic for follow up.  Since last visit the pain has has improved.    The pain is located in the gluteal area and tail bone area and radiates to the right hip and groin area.  The pain is described as sharp and shooting    At BEST  2/10   At WORST  4/10 on the WORST day.    On average pain is rated as 2/10.   Today the pain is rated as 3/10  Symptoms interfere with daily activity and sleeping.   Exacerbating factors: Sitting and Walking.    Mitigating factors medications.     Current pain medications: tylenol, diclofenac 75mg BID  Failed Pain Medications: gabapentin (not helpful for buttock pain)    Pain Procedures:  7/18/24 @845 - R GTB - 100%X2W    Initial hx:  Maria M Mei is a 64 y.o. female presents to the clinic for the evaluation of right buttock pain. The pain started 2 years ago following no inciting event and symptoms have been worsening.  The patient states that she has been having a lot of right buttock pain for a couple years since she had an achilles injury and was in a boot for an extended period.      Then on 6/10/24 she slipped and landed on her butt. Since then her tailbone has been really painful.  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  She had an XR at the ER and it did not show any fracture.  The pain has improved somewhat since 6/10.  The pain is just "there" all the time and worse with prolonged sitting.  She has not started PT yet, but it is scheduled.     Pain Description:    The pain is located in the buttock and tail bone area and radiates to the right groin area.    At BEST  6/10   At WORST  10/10 on the WORST day.    On " average pain is rated as 7/10.   Today the pain is rated as 8/10  The pain is intermittent.  The pain is described as sharp and shooting    Symptoms interfere with daily activity and sleeping.   Exacerbating factors: Sitting and Walking.    Mitigating factors medications.   She reports 6 hours of sleep per night.    Physical Therapy/Home Exercise: Yes, currently has a home exercise program    Current Pain Medications:    - Percocet 5mg (doesn't take), tylenol, ibuprofen    Failed Pain Medications:    - gabapentin (not helpful for buttock pain)    Pain Treatment Therapies:    Pain procedures: none  Physical Therapy: none  Chiropractor: none  Acupuncture: none  TENS unit: none  Spinal decompression: none  Joint replacement: none    Patient denies urinary incontinence, bowel incontinence, significant motor weakness, and loss of sensations.  Patient denies any suicidal or homicidal ideations     report:  Reviewed and consistent with medication use as prescribed.    Imaging:   XR Sacrum 06/11/24:    Sclerotic changes of the SI joints bilaterally, left-sided greater than right.  No erosions.  No evidence of acute fracture or dislocation.  A few pelvic phleboliths.         5/27/2025     1:52 PM 8/29/2024     8:11 AM 6/26/2024     9:03 AM   Pain Disability Index (PDI)   Family/Home Responsibilities: 4 2 8   Recreation: 4 2 8   Occupation: 4 2 8   Sexual Behavior: 4 2 8   Self Care: 4 2 8   Life-Support Activities: 4 2 8   Pain Disability Index (PDI) 28 14 56        Past Medical History:   Diagnosis Date    COVID-19 12/2020    Hyperlipidemia     MI (myocardial infarction)     Stroke      Past Surgical History:   Procedure Laterality Date    ANKLE SURGERY      CHOLECYSTECTOMY      INJECTION, SPINE, LUMBOSACRAL, TRANSFORAMINAL APPROACH Right 4/15/2025    Procedure: Right L4-5, L5-S1 TFESI (may adjust level based on MRI);  Surgeon: Kings Mooney DO;  Location: [facility];  Service: Pain Management;   Laterality: Right;  ASA 81mg daily-ok     Social History     Socioeconomic History    Marital status: Single   Tobacco Use    Smoking status: Every Day     Current packs/day: 0.50     Average packs/day: 0.5 packs/day for 38.4 years (19.2 ttl pk-yrs)     Types: Cigarettes     Start date: 1987    Smokeless tobacco: Never    Tobacco comments:     quit for 2 years in the middle; weaned self down, now 3 / day   Substance and Sexual Activity    Alcohol use: Yes     Alcohol/week: 1.0 standard drink of alcohol     Types: 1 Standard drinks or equivalent per week     Comment: Socially    Drug use: No    Sexual activity: Not Currently     Social Drivers of Health     Financial Resource Strain: Low Risk  (4/24/2023)    Overall Financial Resource Strain (CARDIA)     Difficulty of Paying Living Expenses: Not hard at all   Food Insecurity: No Food Insecurity (4/24/2023)    Hunger Vital Sign     Worried About Running Out of Food in the Last Year: Never true     Ran Out of Food in the Last Year: Never true   Transportation Needs: No Transportation Needs (4/24/2023)    PRAPARE - Transportation     Lack of Transportation (Medical): No     Lack of Transportation (Non-Medical): No   Physical Activity: Insufficiently Active (4/24/2023)    Exercise Vital Sign     Days of Exercise per Week: 2 days     Minutes of Exercise per Session: 60 min   Stress: No Stress Concern Present (4/24/2023)    [employer] - Occupational Stress Questionnaire     Feeling of Stress : Only a little   Housing Stability: Low Risk  (4/24/2023)    Housing Stability Vital Sign     Unable to Pay for Housing in the Last Year: No     Number of Places Lived in the Last Year: 1     Unstable Housing in the Last Year: No     Family History   Problem Relation Name Age of Onset    Obesity Mother      Cancer Mother      Diabetes Mother      Hypertension Mother      Lung cancer Mother      Hypertension Father      Stroke Father      Heart disease  Father      Obesity Sister 2     Hyperlipidemia Sister 2     Hypertension Sister 2     Arthritis Sister 2     Obesity Brother 2     Drug abuse Brother 2     Diabetes Brother 2     Heart disease Brother 2     Diabetes Daughter x1     Bipolar disorder Daughter x1     Obesity Daughter x1     No Known Problems Maternal Grandmother      No Known Problems Maternal Grandfather      No Known Problems Paternal Grandmother      No Known Problems Paternal Grandfather         Review of patient's allergies indicates:   Allergen Reactions    Codeine Itching       Current Outpatient Medications   Medication Sig    albuterol (VENTOLIN HFA) 90 mcg/actuation inhaler Inhale 2 puffs into the lungs every 6 (six) hours as needed for Wheezing or Shortness of Breath.  Rescue    ascorbic acid, vitamin C, (VITAMIN C) 250 MG tablet Take 250 mg by mouth once daily.    atorvastatin (LIPITOR) 40 MG tablet TAKE 1 TABLET ONE TIME DAILY    EScitalopram oxalate (LEXAPRO) 10 MG tablet Take 1 tablet (10 mg total) by mouth once daily.    fluticasone propionate (FLONASE) 50 mcg/actuation nasal spray 2 sprays (100 mcg total) by Each Nostril route once daily.    methocarbamoL (ROBAXIN) 750 MG Tab Take 1-2 tablets (750-1,500 mg total) by mouth 2 (two) times daily as needed (muscle pain).    nicotine (NICODERM CQ) 21 mg/24 hr Place 1 patch onto the skin once daily.    nicotine, polacrilex, (NICORETTE) 2 mg Gum Take 1 each (2 mg total) by mouth as needed (as needed).    pantoprazole (PROTONIX) 40 MG tablet TAKE 1 TABLET EVERY DAY    QUEtiapine (SEROQUEL) 25 MG Tab Take 1 tablet (25 mg total) by mouth once daily.    aspirin 81 MG Chew Chew and swallow 1 tablet (81 mg total) by mouth once daily.    ibuprofen (ADVIL,MOTRIN) 800 MG tablet Take 1 tablet (800 mg total) by mouth 2 (two) times daily as needed for Pain. Take with food     No current facility-administered medications for this visit.       REVIEW OF SYSTEMS:    GENERAL:  No weight loss, malaise or  "fevers.  HEENT:   No recent changes in vision or hearing  NECK:  Negative for lumps, no difficulty with swallowing.  RESPIRATORY:  Negative for cough, wheezing or shortness of breath, patient denies any recent URI.  CARDIOVASCULAR:  Negative for chest pain, leg swelling or palpitations.  GI:  Negative for abdominal discomfort, blood in stools or black stools or change in bowel habits.  MUSCULOSKELETAL:  See HPI.  SKIN:  Negative for lesions, rash, and itching.  PSYCH:  No mood disorder or recent psychosocial stressors.  Patients sleep is not disturbed secondary to pain.  HEMATOLOGY/LYMPHOLOGY:  Negative for prolonged bleeding, bruising easily or swollen nodes.  Patient is not currently taking any anti-coagulants  NEURO:   No history of headaches, syncope, paralysis, seizures or tremors.  All other reviewed and negative other than HPI.    OBJECTIVE:    /79 (BP Location: Left arm, Patient Position: Sitting)   Pulse 66   Ht 5' 7" (1.702 m)   Wt 86.7 kg (191 lb 2.2 oz)   BMI 29.94 kg/m²     PHYSICAL EXAMINATION:    GENERAL: Well appearing, in no acute distress, alert and oriented x3.  PSYCH:  Mood and affect appropriate.  SKIN: Skin color, texture, turgor normal, no rashes or lesions.  HEAD/FACE:  Normocephalic, atraumatic. Cranial nerves grossly intact.  CV: RRR with palpation of the radial artery.    PULM: CTAB. No evidence of respiratory difficulty, symmetric chest rise.  GI:  Soft and non-tender.    BACK:   - No obvious deformity or signs of trauma, Normal lumbar lordotic curve  - Negative spinous process tenderness  - Negative paravertebral tenderness  - Negative pain to palpation over the facet joints of the lumbar spine.   - Positive QL / Iliac crest / Glut tenderness on the right  - Slump test is Negative for radicular pain  - Slump test is Negative for back pain  - Supine Straight leg raising is Negative for radicular pain  - Supine Straight leg raising is Negative for back pain  - Lumbar ROM is " diminished in Flexion with pain  - Lumbar ROM is normal in Extension without pain  - Did not perform Sustained Hip Flexion test (for discogenic pain)  - Positive Altered Gait, Posture  - Axial facet loading test Negative on the bilateral side(s)    SI Joint exam:  - Positive SI joint tenderness to palpation  - Bull's sign Negative  - Yeoman's Test: Did not perform for SI joint pain indicating anterior SI ligament involvement. Did not perform for anterior thigh pain/paresthesia which indicates femoral nerve stretch.  - Gaenslen's Test:Negative  - Finger Betty's Sign:Negative  - SI compression test:Did not perform  - SI distraction test:Negative  - Thigh Thrust: Positive  - SI Thrust: Did not perform    MUSKULOSKELETAL:    EXTREMITIES:   Hip Exam:  - Log Roll Negative   - FADIR Positive  - FAIR (+) for piriformis  - Stinchfield Positive  - Hip Scour Negative  - GTB Tenderness Positive    MUSCULOSKELETAL:  No atrophy or tone abnormalities are noted in the UE or LE.  No deformities, edema, or skin discoloration are noted on visible skin. Good capillary refill.    NEURO: Bilateral upper and lower extremity coordination and muscle stretch reflexes are physiologic and symmetric.      NEUROLOGICAL EXAM:  MENTAL STATUS: A x O x 3, good concentration, speech is fluent and goal directed  MEMORY: recent and remote are intact  CN: CN2-12 grossly intact  MOTOR: 5/5 in all muscle groups  DTRs: 2+ intact symmetric  Sensation:    -no Loss of sensation in a left lower and right lower leg distribution.  Babinski: absent on the bilateral side(s)  Hampton: absent on the bilateral side(s)  Clonus: absent on the bilateral side(s)    GAIT: antalgic.

## 2025-06-02 ENCOUNTER — TELEPHONE (OUTPATIENT)
Dept: PAIN MEDICINE | Facility: CLINIC | Age: 65
End: 2025-06-02
Payer: MEDICARE

## 2025-06-05 ENCOUNTER — TELEPHONE (OUTPATIENT)
Dept: PAIN MEDICINE | Facility: HOSPITAL | Age: 65
End: 2025-06-05
Payer: MEDICARE

## 2025-06-09 ENCOUNTER — HOSPITAL ENCOUNTER (OUTPATIENT)
Facility: HOSPITAL | Age: 65
Discharge: HOME | End: 2025-06-09
Attending: STUDENT IN AN ORGANIZED HEALTH CARE EDUCATION/TRAINING PROGRAM | Admitting: STUDENT IN AN ORGANIZED HEALTH CARE EDUCATION/TRAINING PROGRAM
Payer: MEDICARE

## 2025-06-09 VITALS
OXYGEN SATURATION: 100 % | HEART RATE: 55 BPM | SYSTOLIC BLOOD PRESSURE: 123 MMHG | DIASTOLIC BLOOD PRESSURE: 63 MMHG | RESPIRATION RATE: 16 BRPM | HEIGHT: 66 IN | BODY MASS INDEX: 30.86 KG/M2 | WEIGHT: 192 LBS | TEMPERATURE: 98 F

## 2025-06-09 DIAGNOSIS — M70.61 TROCHANTERIC BURSITIS OF RIGHT HIP: Primary | ICD-10-CM

## 2025-06-09 DIAGNOSIS — M79.10 MYALGIA: ICD-10-CM

## 2025-06-09 DIAGNOSIS — M70.60 TROCHANTERIC BURSITIS: ICD-10-CM

## 2025-06-09 PROCEDURE — 20552 NJX 1/MLT TRIGGER POINT 1/2: CPT | Mod: 59 | Performed by: STUDENT IN AN ORGANIZED HEALTH CARE EDUCATION/TRAINING PROGRAM

## 2025-06-09 PROCEDURE — 20552 NJX 1/MLT TRIGGER POINT 1/2: CPT | Mod: 59,,, | Performed by: STUDENT IN AN ORGANIZED HEALTH CARE EDUCATION/TRAINING PROGRAM

## 2025-06-09 PROCEDURE — 77002 NEEDLE LOCALIZATION BY XRAY: CPT | Mod: 26,,, | Performed by: STUDENT IN AN ORGANIZED HEALTH CARE EDUCATION/TRAINING PROGRAM

## 2025-06-09 PROCEDURE — 25500020 PHARM REV CODE 255: Performed by: STUDENT IN AN ORGANIZED HEALTH CARE EDUCATION/TRAINING PROGRAM

## 2025-06-09 PROCEDURE — 63600175 PHARM REV CODE 636 W HCPCS: Performed by: STUDENT IN AN ORGANIZED HEALTH CARE EDUCATION/TRAINING PROGRAM

## 2025-06-09 PROCEDURE — 20610 DRAIN/INJ JOINT/BURSA W/O US: CPT | Mod: RT | Performed by: STUDENT IN AN ORGANIZED HEALTH CARE EDUCATION/TRAINING PROGRAM

## 2025-06-09 PROCEDURE — 20610 DRAIN/INJ JOINT/BURSA W/O US: CPT | Mod: RT,,, | Performed by: STUDENT IN AN ORGANIZED HEALTH CARE EDUCATION/TRAINING PROGRAM

## 2025-06-09 RX ORDER — BUPIVACAINE HYDROCHLORIDE 2.5 MG/ML
INJECTION, SOLUTION EPIDURAL; INFILTRATION; INTRACAUDAL; PERINEURAL
Status: DISCONTINUED | OUTPATIENT
Start: 2025-06-09 | End: 2025-06-09 | Stop reason: HOSPADM

## 2025-06-09 RX ORDER — LIDOCAINE HYDROCHLORIDE 20 MG/ML
INJECTION, SOLUTION EPIDURAL; INFILTRATION; INTRACAUDAL; PERINEURAL
Status: DISCONTINUED | OUTPATIENT
Start: 2025-06-09 | End: 2025-06-09 | Stop reason: HOSPADM

## 2025-06-09 RX ORDER — TRIAMCINOLONE ACETONIDE 40 MG/ML
INJECTION, SUSPENSION INTRA-ARTICULAR; INTRAMUSCULAR
Status: DISCONTINUED | OUTPATIENT
Start: 2025-06-09 | End: 2025-06-09 | Stop reason: HOSPADM

## 2025-06-09 NOTE — PLAN OF CARE
Patient discharge instructions reviewed, patient verbalized understanding. Pt ambulated with the use of her cane, steady gait, unassisted,   No concerns voiced.

## 2025-06-09 NOTE — OP NOTE
Greater Trochanteric Bursa Injection under Fluoroscopic Guidance    The procedure, risks, benefits, and options were discussed with the patient. There are no contraindications to the procedure. The patent expressed understanding and agreed to the procedure. Informed written consent was obtained prior to the start of the procedure and can be found in the patient's chart.    PATIENT NAME: Maria M Mei   MRN: [id number]     DATE OF PROCEDURE: 06/09/2025    PROCEDURE: Right Greater Trochanteric Bursa Injection under Fluoroscopic Guidance    PRE-OP DIAGNOSIS: Myalgia [M79.10]  Trochanteric bursitis of right hip [M70.61]  Myalgia, other site [M79.18]    POST-OP DIAGNOSIS: Same    PHYSICIAN: Kings Mooney DO    ASSISTANTS: none    MEDICATIONS INJECTED: Preservative-free Kenalog 40mg with 5cc of Bupivacine 0.25%     LOCAL ANESTHETIC INJECTED: Xylocaine 2%     SEDATION: None    ESTIMATED BLOOD LOSS: None    COMPLICATIONS: None    TECHNIQUE: Time-out was performed to identify the patient and procedure to be performed. With the patient laying in a prone position, the surgical area was prepped and draped in the usual sterile fashion using ChloraPrep and a fenestrated drape. The area overlying the greater trochanteric bursa was determined under fluoroscopy guidance. Skin anesthesia was achieved by injecting Lidocaine 2% over the injection site. The greater trochanteric bursa was then approached with a 22 gauge, 5 inch spinal quinke needle that was introduced under fluoroscopic guidance in the AP view. Once the needle tip was in the area of the bursa, and there was no blood aspiration, Contrast dye  Omnipaque (300mg/mL) was injected to confirm placement and there was no vascular runoff. 6 mL of the medication mixture listed above was injected slowly. The needles were removed and bleeding was nil. A sterile dressing was applied. No specimens collected. The patient tolerated the procedure well.    PRE-PROCEDURE PAIN  SCORE: 5/10    POST-PROCEDURE PAIN SCORE: 0/10    The patient was monitored after the procedure in the recovery area. They were given post-procedure and discharge instructions to follow at home. The patient was discharged in a stable condition.    Kings Persaud, DO     ===================================================    PROCEDURE: right Piriformis Injection with Ultrasound Guidance    PATIENT NAME: Maria M Mei   MRN: [id number]     DATE OF PROCEDURE: 06/09/2025    Diagnosis: Piriformis Syndrome (G57.01)  CPT code: 71893 (Injection(s); single or multiple trigger point(s), 1-2 muscles), 43463 (Ultrasonic guidance for needle placement imaging supervision and interpretation)    Injection # 1 this year.    Postprocedural Diagnosis: Same  Needle Type: - 22G Stimuplex Needle    Solution injected: A 5ml mixture of 0.25% Bupivacaine and 40mg Kenalog  Volume Injected:  5 ml    Estimated Blood Loss - <2ml  Drains: None  Specimens Removed: None  Urine Output - Not Measured  Complications: None  Outcome: Good  VAS Before Injection:  5/10  VAS After Injection:  0/10    Informed Consent:  The patient's condition and proposed procedures, risks (including complications of nerve damage,  bleeding, infection, and failure of pain relief), and alternatives were discussed with the patient or responsible party.  The patient's/responsible party's questions were answered.  The patient/responsible party appeared to understand and chose to proceed.  Informed consent was obtained.  A procedural pause verifying correct patient, medical record number, allergies, medications to be administered, current vital signs, and surgical site was performed immediately prior to beginning the procedure.    Procedure in Detail:  The patient was taken back to the ultrasound procedure suite and placed in a prone position.  The skin overlying the injection site was prepped and draped in an aseptic fashion.     Ultrasound was used to identify the  landmarks to locate the piriformis muscle on the side noted above, which included the gluteus aaliyah muscle, piriformis muscle, and sciatic nerve.  Dynamic testing was performed to exam the proper movement of the muscle under ultrasound.  The skin entry site was marked.    The skin and subcutaneous tissue overlying the target site of injection was anesthetized using 5 ml of 1% lidocaine MPF with a 27-gauge, 1½ -inch needle.   The above noted needle was directed under ultrasound guidance until the needle was located firmly within the piriformis muscle.  Next, the above noted diagnostic/therapeutic solution was injected without resistance.  The needle was then retracted approximately senior living and the needle track was flushed with 1 mL of 1% lidocaine.  The needle was then removed.     The patient tolerated the procedure well and was monitored until deemed appropriate for discharge home.    DISCUSSION:  A piriformis muscle injection was performed today to treat the patients piriformis pain. The purpose was to improve the patients function and decrease pain. We have reminded the patient that the injections must be done in conjunction with a stretching program.  Without a stretching and exercise program, results from the injections are often suboptimal.  The patient was advised to relax and avoid any heavy lifting or excessive bending for 24 hours.  She was advised that she may return to her usual activities after 24 hours if she is otherwise feeling well.   The patient was advised not to bathe or soak in water for 24 hours but that showering would be acceptable.      Note Electronically Signed By:  Kings Persaud  06/09/2025

## 2025-06-09 NOTE — DISCHARGE INSTRUCTIONS
[facility]  [employer] [employer] service # [phone]  On-call pager for emergency# [phone]     POST-PROCEDURE INSTRUCTIONS:    Today you had an injection that included a steroid medications.  The steroid may or may not have been mixed with a local anesthetic when it was injected.   If the injection was in the neck, you may feel some pressure, numbness, or slight weakness in the arm after the procedure for a short period of time (this is a normal response), if this persists for longer than 1 day please contact our office or go to the emergency room.  If the injection was in the low back, you may feel some pressure, numbness, or slight weakness in the leg after the procedure for a short period of time (this is a normal response), if this persists for longer than 1 day please contact our office or go to the emergency room.  You may get side effects from the steroid.  This is not uncommon.  Symptoms include: elevated blood sugar, elevated blood pressure, headache, flushing, nausea, insomnia.  These symptoms are transient and will resolve within 1-3 days.  If symptoms last longer than this please contact our office or head to the emergency room.  Steroid medications can take anywhere from 3-14 days to take effect (rarely longer).  You may notice that your pain worsens for a short period of time after the injection, this would not be unusual due to the pressure and trauma from the needle.    If you do not have a follow up appointment scheduled, please contact my office (or the office of the physician who referred you for the procedure) to get a post-procedure follow up scheduled 2-4 weeks after the procedure.  This can be done as a virtual visit if that is more convenient for you.      What you need to do:    Keep a record of your response to the injection you had today.    How much relief did you get?   When did the relief start and how long did it last?  Were you  able to decrease the use of any of your pain medications?  Were you able to increase your level of activity?  How long did the relief last?    What to watch out for:    If you experience any of the following symptoms after your procedure, please notify the messaging service immediately (see above for contact information):   fever (increased oral temperature)   bleeding or swelling at the injection site,    drainage, rash or redness at the injection site    possible signs of infection    increased pain at the injection site   worsening of your usual pain   severe headache   new or worsening numbness    new arm and/or leg weakness, or    changes in bowel and/or bladder function: urinating or defecating on yourself and not knowing that you did it.    PLEASE FOLLOW ALL INSTRUCTIONS CAREFULLY     Do not engage in strenuous activity (e.g., lifting or pushing heavy objects or repeated bending) for 24 hours.     Do not take a bath, swim or use Jacuzzi for 24 hours after procedure. (A shower is fine).   Remove any Band-Aids when you get home.    Use cold/ice, as needed for comfort.  We recommend the use of cold therapy alternating on for 20 minutes, off for 20 minutes.    Do not apply direct heat (heating pad or heat packs) to the injection site for 24 hours.     Resume your usual medications, unless instructed otherwise by your Pain Physician.     If you are on warfarin (Coumadin) or other blood thinner, resume this medication as instructed by your prescribing Physician.    IF AT ANY POINT YOU ARE VERY CONCERNED ABOUT YOUR SYMPTOMS, PLEASE GO TO THE EMERGENCY ROOM.    If you develop worsening pain, weakness, numbness, lose bowel or bladder control (i.e., having an accident where you did not even know you had to go to the bathroom and suddenly noticed you soiled yourself), saddle anesthesia (a loss of sensation restricted to the area of the buttocks, anus and between the legs -- i.e., those parts of your body that would  touch a saddle if you were sitting on one) you need to go immediately to the emergency department for evaluation and treatment.    ----------------------------------------------------------------------------------------------------------------------------------------------------------------  If you received Sedation please read the following instructions:  POST SEDATION INSTRUCTIONS    Today you received intravenous medication (also known as sedation) that was used to help you relax and/or decrease discomfort during your procedure. This medication will be acting in your body for the next 24 hours, so you might feel a little tired or sleepy. This feeling will slowly wear off.   Common side effects associated with these medications include: drowsiness, dizziness, sleepiness, confusion, feeling excited, difficulty remembering things, lack of steadiness with walking or balance, loss of fine muscle control, slowed reflexes, difficulty focusing, and blurred vision.  Some over-the-counter and prescription medications (e.g., muscle relaxants, opioids, mood-altering medications, sedatives/hypnotics, antihistamines) can interact with the intravenous medication you received and cause an increased risk of the side effects listed above in addition to other potentially life threatening side effects. Use extreme caution if you are taking such medications, and consult with your Pain Physician or prescribing physician if you have any questions.  For the next 12-24 hours:    DO NOT--Drive a car, operate machinery or power tools   DO NOT--Drink any alcoholic beverages (not even beer), they may dangerously increase the risk of side effects.    DO NOT--Make any important legal or business decisions or sign important documents.  We advise you to have someone to assist you at home. Move slowly and carefully. Do not make sudden changes in position. Be aware of dizziness or light-headedness and move accordingly.   If you seek medical  treatment within 24 hours, let the nurse or doctor caring for you know that you have received the above medications. If you have any questions or concerns related to your sedation or treatment today please contact us.

## 2025-07-16 DIAGNOSIS — N18.31 CHRONIC KIDNEY DISEASE, STAGE 3A: ICD-10-CM

## 2025-08-01 ENCOUNTER — OFFICE VISIT (OUTPATIENT)
Dept: PAIN MEDICINE | Facility: CLINIC | Age: 65
End: 2025-08-01
Payer: MEDICARE

## 2025-08-01 ENCOUNTER — LAB VISIT (OUTPATIENT)
Dept: LAB | Facility: HOSPITAL | Age: 65
End: 2025-08-01
Attending: FAMILY MEDICINE
Payer: MEDICARE

## 2025-08-01 VITALS
DIASTOLIC BLOOD PRESSURE: 77 MMHG | HEART RATE: 71 BPM | HEIGHT: 66 IN | SYSTOLIC BLOOD PRESSURE: 118 MMHG | BODY MASS INDEX: 30.76 KG/M2 | WEIGHT: 191.38 LBS

## 2025-08-01 DIAGNOSIS — M79.10 MYALGIA: ICD-10-CM

## 2025-08-01 DIAGNOSIS — M54.16 LUMBAR RADICULOPATHY: ICD-10-CM

## 2025-08-01 DIAGNOSIS — N18.31 CHRONIC KIDNEY DISEASE, STAGE 3A: ICD-10-CM

## 2025-08-01 DIAGNOSIS — M70.61 TROCHANTERIC BURSITIS OF RIGHT HIP: Primary | ICD-10-CM

## 2025-08-01 LAB
ALBUMIN/CREAT UR: 20 UG/MG
CREAT UR-MCNC: 405 MG/DL (ref 15–325)
MICROALBUMIN UR-MCNC: 81 UG/ML (ref ?–5000)

## 2025-08-01 PROCEDURE — 82570 ASSAY OF URINE CREATININE: CPT

## 2025-08-01 PROCEDURE — 99999 PR PBB SHADOW E&M-EST. PATIENT-LVL III: CPT | Mod: PBBFAC,,, | Performed by: STUDENT IN AN ORGANIZED HEALTH CARE EDUCATION/TRAINING PROGRAM

## 2025-08-01 NOTE — PROGRESS NOTES
- Chronic Pain - f/u    Referring Physician: No ref. provider found    Date: 08/01/2025     Re: Maria M Mei  MR#: [id number]  YOB: 1960  Age: 65 y.o.    Chief Complaint:   Chief Complaint   Patient presents with    Follow-up    Low-back Pain     **This note is dictated using the M*Modal Fluency Direct word recognition program. There are word recognition mistakes that are occasionally missed on review.**    ASSESSMENT: 65 y.o. year old female with right buttock/thigh and tailbone pain, consistent with     No diagnosis found.    PLAN:     Right lumbar radiculopathy  4/15/25 - Right L4-5, L5-S1 TFESI (may adjust level based on MRI) - RN sed - no Ac - 50%@6w  -new MRI lumbar reviewed. She has severe right L3-4, L4-5 and L5-S1 foraminal stenosis.  -if TFESI not helpful then consider Piriformis vs GTB injection  -Continue methocarbamol 750-1500mg QD PRN.  -Rx ibuprofen 800mg BID PRN.  Recommended taking the least amount possible  -finish medrol dose jamila  -discussed back brace while working in the garden    Right piriformis syndrome and Right trochanteric bursitis  7/18/24 @845 - R GTB - 100% x6m  -stopped diclofenac  6/9/25 - Right piriformis + Right GTB with ultrasound - no sed - no AC - >50%@2m    Traumatic coccydynia - resolved  -continue aspercream and lidocaine patches  -completed PT  -tylenol and ibuprofen was helpful    Right hip pain  -patient has mild OA and mild (+) provocative maneuvers  -suspect that this may also be causing some underlying right bursitis issues.    - RTC 3-4 months  - Counseled patient regarding the importance of weight loss and activity modification and physical therapy.    The above plan and management options were discussed at length with patient. Patient is in agreement with the above and verbalized understanding. It will be communicated with the referring physician via electronic record, fax, or mail.  Lab/study reports reviewed were important and necessary because  subsequent medical and treatment recommendations required review of the above lab/study reports. Images viewed/reviewed above were important and necessary because subsequent medical and treatment recommendations required review of the reviewed image(s).     Electronically signed by:  Kings Mooney DO  08/01/2025    =========================================================================================================    SUBJECTIVE:    Interval History 8/1/2025:   Maria M Mei is a 65 y.o. female presents to the clinic for follow up.  Since last visit the pain has has improved in some ways and worsened in others. The patient thinks that she has had improvement from the injections.  The buttock is doing better.  She only has to take robaxin about 1x/day. Pain is just achy.     The pain is located in the low back area and does not radiate.  The pain is described as throbbing    At BEST  1/10   At WORST  5/10 on the WORST day.    On average pain is rated as 2/10.   Today the pain is rated as 4/10  Symptoms interfere with dailty activity  Exacerbating factors: Bending, Walking, and stretching.    Mitigating factors medications, rest, and sitting.     Current pain medications:  800mg ibuprofen BID PRN (using rare now), seroquel 25mg qhs, Tylenol 650mg BID, Robaxin 750-1500 QD PRN  Failed Pain Medications: diclofenac 50mg, gabapentin (not helpful for buttock pain), Medrol dose jamila,    Pain Procedures:  7/18/24 @845 - R GTB - 100%X6m  4/15/25 - Right L4-5, L5-S1 TFESI (may adjust level based on MRI) - RN sed - no Ac  6/9/25 - Right piriformis + Right GTB with ultrasound - no sed - no AC    Interval History 5/27/2025:   Maria M Mei is a 65 y.o. female presents to the clinic for follow up.  Since last visit the pain has has improved.  The injection on 4/15/25 helped the back pain that she gets with bending over.  It helped the radicular pain in the right leg.  She takes robaxin which helps robaxin.   Back pain is 1/10.      The pain is located in the gluteal area and tailbone area and radiates to the right hip, groin, and leg.  The pain is described as aching and tingling    At BEST  2/10   At WORST  10/10 on the WORST day.    On average pain is rated as 4/10.   Today the pain is rated as 2/10  Symptoms interfere with daily activity.   Exacerbating factors: Sitting and Touching.    Mitigating factors medications, Ice.     Interval History 3/31/2025:   Maria M Mei is a 65 y.o. female presents to the clinic for follow up.  Since last visit the pain has has significantly worsened. The patient went to the ER on 3/27.  She get stabbing in her back.  The pain radiates down the side of her leg to the knee.  This is radiating down the right side.  This started about 1 month ago, but it got really worse around 3/17.  Patient went to PCP and got steroid shot. She went top the ED and got prednisone and another steroid shot.        Interval History 8/29/2024:   Maria M Mei is a 65 y.o. female presents to the clinic for follow up.  Since last visit the pain has has improved.    The pain is located in the gluteal area and tail bone area and radiates to the right hip and groin area.  The pain is described as sharp and shooting    At BEST  2/10   At WORST  4/10 on the WORST day.    On average pain is rated as 2/10.   Today the pain is rated as 3/10  Symptoms interfere with daily activity and sleeping.   Exacerbating factors: Sitting and Walking.    Mitigating factors medications.     Current pain medications: tylenol, diclofenac 75mg BID  Failed Pain Medications: gabapentin (not helpful for buttock pain)    Pain Procedures:  7/18/24 @845 - R GTB - 100%X2W    Initial hx:  Maria M Mei is a 65 y.o. female presents to the clinic for the evaluation of right buttock pain. The pain started 2 years ago following no inciting event and symptoms have been worsening.  The patient states that she has been having a  "lot of right buttock pain for a couple years since she had an achilles injury and was in a boot for an extended period.      Then on 6/10/24 she slipped and landed on her butt. Since then her tailbone has been really painful.  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  She had an XR at the ER and it did not show any fracture.  The pain has improved somewhat since 6/10.  The pain is just "there" all the time and worse with prolonged sitting.  She has not started PT yet, but it is scheduled.     Pain Description:    The pain is located in the buttock and tail bone area and radiates to the right groin area.    At BEST  6/10   At WORST  10/10 on the WORST day.    On average pain is rated as 7/10.   Today the pain is rated as 8/10  The pain is intermittent.  The pain is described as sharp and shooting    Symptoms interfere with daily activity and sleeping.   Exacerbating factors: Sitting and Walking.    Mitigating factors medications.   She reports 6 hours of sleep per night.    Physical Therapy/Home Exercise: Yes, currently has a home exercise program    Current Pain Medications:    - Percocet 5mg (doesn't take), tylenol, ibuprofen    Failed Pain Medications:    - gabapentin (not helpful for buttock pain)    Pain Treatment Therapies:    Pain procedures: none  Physical Therapy: none  Chiropractor: none  Acupuncture: none  TENS unit: none  Spinal decompression: none  Joint replacement: none    Patient denies urinary incontinence, bowel incontinence, significant motor weakness, and loss of sensations.  Patient denies any suicidal or homicidal ideations     report:  Reviewed and consistent with medication use as prescribed.    Imaging:   XR Sacrum 06/11/24:    Sclerotic changes of the SI joints bilaterally, left-sided greater than right.  No erosions.  No evidence of acute fracture or dislocation.  A few pelvic phleboliths.         8/1/2025    10:01 AM 5/27/2025     1:52 PM 8/29/2024     8:11 AM " 6/26/2024     9:03 AM   Pain Disability Index (PDI)   Family/Home Responsibilities: 3 4 2 8   Recreation: 3 4 2 8   Occupation: 3 4 2 8   Sexual Behavior: 3 4 2 8   Self Care: 3 4 2 8   Life-Support Activities: 3 4 2 8   Pain Disability Index (PDI) 21 28 14 56        Past Medical History:   Diagnosis Date    COVID-19 12/2020    Hyperlipidemia     MI (myocardial infarction)     Stroke      Past Surgical History:   Procedure Laterality Date    ANKLE SURGERY      CHOLECYSTECTOMY      INJECTION, SPINE, LUMBOSACRAL, TRANSFORAMINAL APPROACH Right 4/15/2025    Procedure: Right L4-5, L5-S1 TFESI (may adjust level based on MRI);  Surgeon: Kings Mooney DO;  Location: [facility];  Service: Pain Management;  Laterality: Right;  ASA 81mg daily-ok    PIRIFORMIS BLOCK Right 6/9/2025    Procedure: Right piriformis + Right GTB with ultrasound;  Surgeon: Kings Mooeny DO;  Location: [facility];  Service: Pain Management;  Laterality: Right;  - no sed - no AC     Social History     Socioeconomic History    Marital status: Single   Tobacco Use    Smoking status: Every Day     Current packs/day: 0.50     Average packs/day: 0.5 packs/day for 38.6 years (19.3 ttl pk-yrs)     Types: Cigarettes     Start date: 1987    Smokeless tobacco: Never    Tobacco comments:     quit for 2 years in the middle; weaned self down, now 3 / day   Substance and Sexual Activity    Alcohol use: Yes     Alcohol/week: 1.0 standard drink of alcohol     Types: 1 Standard drinks or equivalent per week     Comment: Socially    Drug use: No    Sexual activity: Not Currently     Social Drivers of Health     Financial Resource Strain: Low Risk  (4/24/2023)    Overall Financial Resource Strain (CARDIA)     Difficulty of Paying Living Expenses: Not hard at all   Food Insecurity: No Food Insecurity (4/24/2023)    Hunger Vital Sign     Worried About Running Out of Food in the Last Year: Never true     Ran Out of Food in the Last  Year: Never true   Transportation Needs: No Transportation Needs (4/24/2023)    PRAPARE - Transportation     Lack of Transportation (Medical): No     Lack of Transportation (Non-Medical): No   Physical Activity: Insufficiently Active (4/24/2023)    Exercise Vital Sign     Days of Exercise per Week: 2 days     Minutes of Exercise per Session: 60 min   Stress: No Stress Concern Present (4/24/2023)    [employer] [place] - Occupational Stress Questionnaire     Feeling of Stress : Only a little   Housing Stability: Low Risk  (4/24/2023)    Housing Stability Vital Sign     Unable to Pay for Housing in the Last Year: No     Number of Places Lived in the Last Year: 1     Unstable Housing in the Last Year: No     Family History   Problem Relation Name Age of Onset    Obesity Mother      Cancer Mother      Diabetes Mother      Hypertension Mother      Lung cancer Mother      Hypertension Father      Stroke Father      Heart disease Father      Obesity Sister 2     Hyperlipidemia Sister 2     Hypertension Sister 2     Arthritis Sister 2     Obesity Brother 2     Drug abuse Brother 2     Diabetes Brother 2     Heart disease Brother 2     Diabetes Daughter x1     Bipolar disorder Daughter x1     Obesity Daughter x1     No Known Problems Maternal Grandmother      No Known Problems Maternal Grandfather      No Known Problems Paternal Grandmother      No Known Problems Paternal Grandfather         Review of patient's allergies indicates:   Allergen Reactions    Codeine Itching       Current Outpatient Medications   Medication Sig    albuterol (VENTOLIN HFA) 90 mcg/actuation inhaler Inhale 2 puffs into the lungs every 6 (six) hours as needed for Wheezing or Shortness of Breath.  Rescue    ascorbic acid, vitamin C, (VITAMIN C) 250 MG tablet Take 250 mg by mouth once daily.    atorvastatin (LIPITOR) 40 MG tablet TAKE 1 TABLET ONE TIME DAILY    EScitalopram oxalate (LEXAPRO) 10 MG tablet Take 1 tablet (10 mg  "total) by mouth once daily.    fluticasone propionate (FLONASE) 50 mcg/actuation nasal spray 2 sprays (100 mcg total) by Each Nostril route once daily.    ibuprofen (ADVIL,MOTRIN) 800 MG tablet Take 1 tablet (800 mg total) by mouth 2 (two) times daily as needed for Pain. Take with food    methocarbamoL (ROBAXIN) 750 MG Tab Take 1-2 tablets (750-1,500 mg total) by mouth 2 (two) times daily as needed (muscle pain).    nicotine (NICODERM CQ) 21 mg/24 hr Place 1 patch onto the skin once daily.    nicotine, polacrilex, (NICORETTE) 2 mg Gum Take 1 each (2 mg total) by mouth as needed (as needed).    pantoprazole (PROTONIX) 40 MG tablet TAKE 1 TABLET EVERY DAY    QUEtiapine (SEROQUEL) 25 MG Tab Take 1 tablet (25 mg total) by mouth once daily.    aspirin 81 MG Chew Chew and swallow 1 tablet (81 mg total) by mouth once daily.     No current facility-administered medications for this visit.       REVIEW OF SYSTEMS:    GENERAL:  No weight loss, malaise or fevers.  HEENT:   No recent changes in vision or hearing  NECK:  Negative for lumps, no difficulty with swallowing.  RESPIRATORY:  Negative for cough, wheezing or shortness of breath, patient denies any recent URI.  CARDIOVASCULAR:  Negative for chest pain, leg swelling or palpitations.  GI:  Negative for abdominal discomfort, blood in stools or black stools or change in bowel habits.  MUSCULOSKELETAL:  See HPI.  SKIN:  Negative for lesions, rash, and itching.  PSYCH:  No mood disorder or recent psychosocial stressors.  Patients sleep is not disturbed secondary to pain.  HEMATOLOGY/LYMPHOLOGY:  Negative for prolonged bleeding, bruising easily or swollen nodes.  Patient is not currently taking any anti-coagulants  NEURO:   No history of headaches, syncope, paralysis, seizures or tremors.  All other reviewed and negative other than HPI.    OBJECTIVE:    Ht 5' 6" (1.676 m)   Wt 86.8 kg (191 lb 5.8 oz)   BMI 30.89 kg/m²     PHYSICAL EXAMINATION:    GENERAL: Well appearing, in " no acute distress, alert and oriented x3.  PSYCH:  Mood and affect appropriate.  SKIN: Skin color, texture, turgor normal, no rashes or lesions.  HEAD/FACE:  Normocephalic, atraumatic. Cranial nerves grossly intact.  CV: RRR with palpation of the radial artery.    PULM: CTAB. No evidence of respiratory difficulty, symmetric chest rise.  GI:  Soft and non-tender.    BACK:   - No obvious deformity or signs of trauma, Normal lumbar lordotic curve  - Negative spinous process tenderness  - Negative paravertebral tenderness  - Negative pain to palpation over the facet joints of the lumbar spine.   - Positive QL / Iliac crest / Glut tenderness on the right  - Slump test is Negative for radicular pain  - Slump test is Negative for back pain  - Supine Straight leg raising is Negative for radicular pain  - Supine Straight leg raising is Negative for back pain  - Lumbar ROM is diminished in Flexion with pain  - Lumbar ROM is normal in Extension without pain  - Did not perform Sustained Hip Flexion test (for discogenic pain)  - Positive Altered Gait, Posture  - Axial facet loading test Negative on the bilateral side(s)    SI Joint exam:  - Positive SI joint tenderness to palpation  - Bull's sign Negative  - Yeoman's Test: Did not perform for SI joint pain indicating anterior SI ligament involvement. Did not perform for anterior thigh pain/paresthesia which indicates femoral nerve stretch.  - Gaenslen's Test:Negative  - Finger Betty's Sign:Negative  - SI compression test:Did not perform  - SI distraction test:Negative  - Thigh Thrust: Did not perform  - SI Thrust: Did not perform    MUSKULOSKELETAL:    EXTREMITIES:   Hip Exam:  - Log Roll Negative   - MARCOSIR Did not perform  - FAIR (+) for piriformis  - [facility] Did not perform  - Hip Scour Negative  - GTB Tenderness Positiveon the right    MUSCULOSKELETAL:  No atrophy or tone abnormalities are noted in the UE or LE.  No deformities, edema, or skin discoloration are noted  on visible skin. Good capillary refill.    NEURO: Bilateral upper and lower extremity coordination and muscle stretch reflexes are physiologic and symmetric.      NEUROLOGICAL EXAM:  MENTAL STATUS: A x O x 3, good concentration, speech is fluent and goal directed  MEMORY: recent and remote are intact  CN: CN2-12 grossly intact  MOTOR: 5/5 in all muscle groups  DTRs: 2+ intact symmetric  Sensation:    -no Loss of sensation in a left lower and right lower leg distribution.  Babinski: absent on the bilateral side(s)  Hampton: absent on the bilateral side(s)  Clonus: absent on the bilateral side(s)    GAIT: antalgic.

## 2025-09-03 ENCOUNTER — TELEPHONE (OUTPATIENT)
Dept: FAMILY MEDICINE | Facility: CLINIC | Age: 65
End: 2025-09-03
Payer: MEDICARE

## (undated) DEVICE — SPIKE CONTRAST CONTROLLER

## (undated) DEVICE — GUIDEWIRE AMPLATZ .035X260

## (undated) DEVICE — BLLN SIZING AGA 34MM

## (undated) DEVICE — CATH MPA2 INFINITI 4FR 100CM

## (undated) DEVICE — GUIDEWIRE EMERALD 150CM PTFE

## (undated) DEVICE — SEE MEDLINE ITEM 156894

## (undated) DEVICE — OMNIPAQUE 350 200ML

## (undated) DEVICE — SHEATH BRITE TIP 9F 35CM

## (undated) DEVICE — REPROCESSED CATH ACUNAV 8FR

## (undated) DEVICE — PROTECTION STATION PLUS

## (undated) DEVICE — KIT CUSTOM MANIFOLD

## (undated) DEVICE — SHEATH INTRODUCER 6FR 11CM

## (undated) DEVICE — COVER DRAPE ACUSON STERILE

## (undated) DEVICE — KIT MICROINTRO 4F .018X40X7CM